# Patient Record
Sex: MALE | Race: WHITE | NOT HISPANIC OR LATINO | Employment: OTHER | RURAL
[De-identification: names, ages, dates, MRNs, and addresses within clinical notes are randomized per-mention and may not be internally consistent; named-entity substitution may affect disease eponyms.]

---

## 2019-09-30 ENCOUNTER — TELEPHONE (OUTPATIENT)
Dept: FAMILY MEDICINE CLINIC | Facility: CLINIC | Age: 69
End: 2019-09-30

## 2019-10-02 ENCOUNTER — OFFICE VISIT (OUTPATIENT)
Dept: FAMILY MEDICINE CLINIC | Facility: CLINIC | Age: 69
End: 2019-10-02

## 2019-10-02 VITALS
DIASTOLIC BLOOD PRESSURE: 99 MMHG | RESPIRATION RATE: 17 BRPM | HEIGHT: 68 IN | OXYGEN SATURATION: 95 % | SYSTOLIC BLOOD PRESSURE: 173 MMHG | TEMPERATURE: 97.2 F | WEIGHT: 231.2 LBS | HEART RATE: 58 BPM | BODY MASS INDEX: 35.04 KG/M2

## 2019-10-02 DIAGNOSIS — J42 CHRONIC BRONCHITIS, UNSPECIFIED CHRONIC BRONCHITIS TYPE (HCC): ICD-10-CM

## 2019-10-02 DIAGNOSIS — D13.4 ADENOMA OF LIVER: Primary | ICD-10-CM

## 2019-10-02 PROBLEM — Z98.890 HISTORY OF ANKLE SURGERY: Status: ACTIVE | Noted: 2019-10-02

## 2019-10-02 PROBLEM — I10 ESSENTIAL HYPERTENSION: Status: ACTIVE | Noted: 2019-10-02

## 2019-10-02 PROBLEM — E78.2 MIXED HYPERLIPIDEMIA: Status: ACTIVE | Noted: 2019-10-02

## 2019-10-02 PROBLEM — C67.8 MALIGNANT NEOPLASM OF OVERLAPPING SITES OF BLADDER: Status: ACTIVE | Noted: 2019-10-02

## 2019-10-02 PROBLEM — E11.9 TYPE 2 DIABETES MELLITUS WITHOUT COMPLICATION, WITH LONG-TERM CURRENT USE OF INSULIN: Status: ACTIVE | Noted: 2019-10-02

## 2019-10-02 PROBLEM — Z79.4 TYPE 2 DIABETES MELLITUS WITHOUT COMPLICATION, WITH LONG-TERM CURRENT USE OF INSULIN: Status: ACTIVE | Noted: 2019-10-02

## 2019-10-02 PROCEDURE — 99203 OFFICE O/P NEW LOW 30 MIN: CPT | Performed by: FAMILY MEDICINE

## 2019-10-02 RX ORDER — ALBUTEROL SULFATE 90 UG/1
2 AEROSOL, METERED RESPIRATORY (INHALATION) EVERY 4 HOURS PRN
COMMUNITY

## 2019-10-02 RX ORDER — CHLORAL HYDRATE 500 MG
1000 CAPSULE ORAL
COMMUNITY

## 2019-10-02 RX ORDER — MULTIVIT WITH MINERALS/LUTEIN
1000 TABLET ORAL DAILY
COMMUNITY
End: 2020-07-13

## 2019-10-02 RX ORDER — AMLODIPINE BESYLATE 10 MG/1
10 TABLET ORAL DAILY
COMMUNITY
End: 2020-05-27 | Stop reason: SDUPTHER

## 2019-10-02 RX ORDER — AMPICILLIN TRIHYDRATE 250 MG
500 CAPSULE ORAL DAILY
COMMUNITY
End: 2020-08-05

## 2019-10-02 RX ORDER — ATORVASTATIN CALCIUM 80 MG/1
80 TABLET, FILM COATED ORAL DAILY
COMMUNITY
End: 2020-03-25

## 2019-10-02 RX ORDER — LOSARTAN POTASSIUM 50 MG/1
50 TABLET ORAL DAILY
COMMUNITY
End: 2020-07-13

## 2019-11-13 ENCOUNTER — FLU SHOT (OUTPATIENT)
Dept: FAMILY MEDICINE CLINIC | Facility: CLINIC | Age: 69
End: 2019-11-13

## 2019-11-13 DIAGNOSIS — Z23 NEED FOR INFLUENZA VACCINATION: ICD-10-CM

## 2019-11-13 PROCEDURE — G0008 ADMIN INFLUENZA VIRUS VAC: HCPCS | Performed by: FAMILY MEDICINE

## 2019-11-13 PROCEDURE — 90653 IIV ADJUVANT VACCINE IM: CPT | Performed by: FAMILY MEDICINE

## 2019-11-26 ENCOUNTER — HOSPITAL ENCOUNTER (OUTPATIENT)
Dept: CT IMAGING | Facility: HOSPITAL | Age: 69
Discharge: HOME | End: 2019-11-26
Admitting: INTERNAL MEDICINE

## 2019-11-26 DIAGNOSIS — R91.1 INCIDENTAL PULMONARY NODULE: ICD-10-CM

## 2019-11-26 PROCEDURE — 71250 CT THORAX DX C-: CPT

## 2019-12-27 PROBLEM — J42 CHRONIC BRONCHITIS: Status: ACTIVE | Noted: 2019-12-27

## 2020-03-09 ENCOUNTER — CLINICAL SUPPORT (OUTPATIENT)
Dept: FAMILY MEDICINE CLINIC | Facility: CLINIC | Age: 70
End: 2020-03-09

## 2020-03-09 DIAGNOSIS — J30.2 ACUTE SEASONAL ALLERGIC RHINITIS: Primary | ICD-10-CM

## 2020-03-09 PROCEDURE — 95117 IMMUNOTHERAPY INJECTIONS: CPT | Performed by: FAMILY MEDICINE

## 2020-03-12 ENCOUNTER — CLINICAL SUPPORT (OUTPATIENT)
Dept: FAMILY MEDICINE CLINIC | Facility: CLINIC | Age: 70
End: 2020-03-12

## 2020-03-12 DIAGNOSIS — J30.2 ACUTE SEASONAL ALLERGIC RHINITIS: Primary | ICD-10-CM

## 2020-03-12 PROCEDURE — 95117 IMMUNOTHERAPY INJECTIONS: CPT | Performed by: FAMILY MEDICINE

## 2020-03-16 ENCOUNTER — CLINICAL SUPPORT (OUTPATIENT)
Dept: FAMILY MEDICINE CLINIC | Facility: CLINIC | Age: 70
End: 2020-03-16

## 2020-03-16 DIAGNOSIS — J30.2 ACUTE SEASONAL ALLERGIC RHINITIS: Primary | ICD-10-CM

## 2020-03-16 PROCEDURE — 95117 IMMUNOTHERAPY INJECTIONS: CPT | Performed by: FAMILY MEDICINE

## 2020-03-19 ENCOUNTER — CLINICAL SUPPORT (OUTPATIENT)
Dept: FAMILY MEDICINE CLINIC | Facility: CLINIC | Age: 70
End: 2020-03-19

## 2020-03-19 DIAGNOSIS — E11.9 TYPE 2 DIABETES MELLITUS WITHOUT COMPLICATION, WITH LONG-TERM CURRENT USE OF INSULIN (HCC): Primary | ICD-10-CM

## 2020-03-19 DIAGNOSIS — Z79.4 TYPE 2 DIABETES MELLITUS WITHOUT COMPLICATION, WITH LONG-TERM CURRENT USE OF INSULIN (HCC): Primary | ICD-10-CM

## 2020-03-19 PROCEDURE — 95117 IMMUNOTHERAPY INJECTIONS: CPT | Performed by: FAMILY MEDICINE

## 2020-03-23 ENCOUNTER — CLINICAL SUPPORT (OUTPATIENT)
Dept: FAMILY MEDICINE CLINIC | Facility: CLINIC | Age: 70
End: 2020-03-23

## 2020-03-23 DIAGNOSIS — J01.01 ACUTE RECURRENT MAXILLARY SINUSITIS: Primary | ICD-10-CM

## 2020-03-23 PROBLEM — L57.0 ACTINIC KERATOSIS: Status: ACTIVE | Noted: 2020-03-23

## 2020-03-23 PROBLEM — M25.569 KNEE PAIN: Status: ACTIVE | Noted: 2020-03-23

## 2020-03-23 PROBLEM — N40.0 BENIGN PROSTATIC HYPERPLASIA: Status: ACTIVE | Noted: 2020-03-23

## 2020-03-23 PROBLEM — J02.9 ACUTE PHARYNGITIS: Status: ACTIVE | Noted: 2020-03-23

## 2020-03-23 PROBLEM — E78.5 HYPERLIPIDEMIA: Status: ACTIVE | Noted: 2020-03-23

## 2020-03-23 PROBLEM — J01.90 ACUTE SINUSITIS: Status: ACTIVE | Noted: 2020-03-23

## 2020-03-23 PROBLEM — R59.1 LYMPHADENOPATHY: Status: ACTIVE | Noted: 2020-03-23

## 2020-03-23 PROBLEM — E11.9 DIABETES MELLITUS (HCC): Status: ACTIVE | Noted: 2020-03-23

## 2020-03-23 PROBLEM — I10 HYPERTENSION: Status: ACTIVE | Noted: 2020-03-23

## 2020-03-23 PROBLEM — J30.9 ALLERGIC RHINITIS: Status: ACTIVE | Noted: 2020-03-23

## 2020-03-23 PROCEDURE — 95117 IMMUNOTHERAPY INJECTIONS: CPT | Performed by: FAMILY MEDICINE

## 2020-03-25 ENCOUNTER — TELEPHONE (OUTPATIENT)
Dept: FAMILY MEDICINE CLINIC | Facility: CLINIC | Age: 70
End: 2020-03-25

## 2020-03-25 RX ORDER — ATORVASTATIN CALCIUM 80 MG/1
TABLET, FILM COATED ORAL
Qty: 86 TABLET | Refills: 1 | Status: SHIPPED | OUTPATIENT
Start: 2020-03-25 | End: 2020-09-21

## 2020-03-30 ENCOUNTER — OFFICE VISIT (OUTPATIENT)
Dept: FAMILY MEDICINE CLINIC | Facility: CLINIC | Age: 70
End: 2020-03-30

## 2020-03-30 VITALS
HEART RATE: 66 BPM | TEMPERATURE: 97.9 F | DIASTOLIC BLOOD PRESSURE: 82 MMHG | HEIGHT: 68 IN | WEIGHT: 232 LBS | OXYGEN SATURATION: 94 % | BODY MASS INDEX: 35.16 KG/M2 | RESPIRATION RATE: 18 BRPM | SYSTOLIC BLOOD PRESSURE: 124 MMHG

## 2020-03-30 DIAGNOSIS — J30.2 ACUTE SEASONAL ALLERGIC RHINITIS: Primary | ICD-10-CM

## 2020-03-30 DIAGNOSIS — H01.005 BLEPHARITIS OF LEFT LOWER EYELID, UNSPECIFIED TYPE: ICD-10-CM

## 2020-03-30 PROCEDURE — 95117 IMMUNOTHERAPY INJECTIONS: CPT | Performed by: FAMILY MEDICINE

## 2020-03-30 PROCEDURE — 99213 OFFICE O/P EST LOW 20 MIN: CPT | Performed by: FAMILY MEDICINE

## 2020-03-30 RX ORDER — IPRATROPIUM BROMIDE 42 UG/1
SPRAY, METERED NASAL
COMMUNITY
Start: 2020-02-25 | End: 2022-06-29

## 2020-03-30 RX ORDER — ERYTHROMYCIN 5 MG/G
OINTMENT OPHTHALMIC 3 TIMES DAILY
Qty: 1 EACH | Refills: 0 | Status: SHIPPED | OUTPATIENT
Start: 2020-03-30 | End: 2020-07-13

## 2020-03-30 NOTE — PROGRESS NOTES
Subjective   Herson Ruby is a 69 y.o. male.     Sx started 3 days ago. No trauma, no FB/welding. No vision changes.     Eye Pain    The left eye is affected. The current episode started in the past 7 days (Friday). The problem occurs constantly. There was no injury mechanism. Associated symptoms include an eye discharge. Pertinent negatives include no fever, nausea, vomiting or weakness. Treatments tried: neomycin and polymyxin b and dexamethasone ointment. The treatment provided significant relief.        The following portions of the patient's history were reviewed and updated as appropriate: allergies, current medications, past family history, past medical history, past social history, past surgical history and problem list.    Patient Active Problem List   Diagnosis   • Essential hypertension   • Mixed hyperlipidemia   • Malignant neoplasm of overlapping sites of bladder (CMS/HCC)   • Adenoma of liver   • History of ankle surgery   • Type 2 diabetes mellitus without complication, with long-term current use of insulin (CMS/HCC)   • Chronic bronchitis (CMS/HCC)   • Actinic keratosis   • Acute pharyngitis   • Acute recurrent maxillary sinusitis   • Acute sinusitis   • Allergic rhinitis   • Benign prostatic hyperplasia   • Diabetes mellitus (CMS/HCC)   • Knee pain   • Lymphadenopathy   • Hypertension   • Hyperlipidemia       Current Outpatient Medications on File Prior to Visit   Medication Sig Dispense Refill   • albuterol sulfate  (90 Base) MCG/ACT inhaler Inhale 2 puffs Every 4 (Four) Hours As Needed for Wheezing.     • amLODIPine (NORVASC) 10 MG tablet Take 10 mg by mouth Daily. For blood pressure     • atorvastatin (LIPITOR) 80 MG tablet TAKE 1 TABLET BY MOUTH EVERYDAY AT BEDTIME 86 tablet 1   • ipratropium (ATROVENT) 0.06 % nasal spray INSTILL TWO SPRAYS INTO EACH NOSTRIL TWICE DAILY     • losartan (COZAAR) 50 MG tablet Take 50 mg by mouth Daily. For  blood pressure     • metFORMIN (GLUCOPHAGE) 500 MG  tablet TAKE 1 TABLET BY MOUTH TWICE A  tablet 0   • Multiple Vitamins-Minerals (CENTRUM SILVER 50+MEN PO) Take  by mouth.     • Omega-3 Fatty Acids (FISH OIL) 1000 MG capsule capsule Take 2,000 mg by mouth Daily With Breakfast.     • Cinnamon 500 MG capsule Take 500 mg by mouth Daily.     • umeclidinium-vilanterol (ANORO ELLIPTA) 62.5-25 MCG/INH aerosol powder  inhaler Inhale 1 puff Daily.     • vitamin E 1000 UNIT capsule Take 1,000 Units by mouth Daily.       No current facility-administered medications on file prior to visit.      Current outpatient and discharge medications have been reconciled for the patient.  Reviewed by: Santana Sharma MD      Allergies   Allergen Reactions   • Valsartan GI Intolerance     Chest pain, dizzy,        Review of Systems   Constitutional: Negative for activity change, appetite change, fatigue and fever.   HENT: Negative for ear pain, swollen glands and voice change.    Eyes: Positive for pain and discharge. Negative for visual disturbance.   Respiratory: Negative for shortness of breath and wheezing.    Cardiovascular: Negative for chest pain and leg swelling.   Gastrointestinal: Negative for abdominal pain, blood in stool, constipation, diarrhea, nausea and vomiting.   Endocrine: Negative for polydipsia and polyuria.   Genitourinary: Negative for dysuria, frequency and hematuria.   Musculoskeletal: Negative for joint swelling, neck pain and neck stiffness.   Skin: Negative for rash and bruise.   Neurological: Negative for weakness, numbness and headache.   Psychiatric/Behavioral: Negative for suicidal ideas and depressed mood.     I have reviewed and confirmed the accuracy of the ROS as documented by the MA/LPN/RN Santana Sharma MD      Objective   Vitals:    03/30/20 1058   BP: 124/82   Pulse: 66   Resp: 18   Temp: 97.9 °F (36.6 °C)   SpO2: 94%     Physical Exam   Constitutional: He is oriented to person, place, and time. He appears well-developed and  well-nourished.   Eyes: Pupils are equal, round, and reactive to light. Conjunctivae and EOM are normal. Left eye exhibits no discharge, no exudate and no hordeolum. No foreign body present in the left eye. Left conjunctiva is not injected.   No abrasion on fluorescein staining   Neck: Normal range of motion. Neck supple.   Cardiovascular: Normal rate, regular rhythm and normal heart sounds.   Pulmonary/Chest: Effort normal and breath sounds normal.   Abdominal: Soft. Bowel sounds are normal.   Musculoskeletal: Normal range of motion.   Neurological: He is alert and oriented to person, place, and time.   Skin: Skin is warm and dry.   Psychiatric: He has a normal mood and affect. His behavior is normal. Judgment and thought content normal.       I wore protective equipment throughout this patient encounter to include gloves. Hand hygiene was performed before donning protective equipment and after removal when leaving the room.    Assessment/Plan .  Problem List Items Addressed This Visit     None      Visit Diagnoses     Acute seasonal allergic rhinitis    -  Primary    Relevant Medications    ipratropium (ATROVENT) 0.06 % nasal spray    Allergy Serum Injection (Completed)    Allergy Serum Injection (Completed)    Blepharitis of left lower eyelid, unspecified type        Relevant Medications    erythromycin (ROMYCIN) 5 MG/GM ophthalmic ointment      Exam benign. Suspect partially treated blepharitis.  Findings discussed. All questions answered.  Differential diagnosis discussed.   Follow-up in approximately 3 days for reevaluation if not improved.  Follow-up sooner for worsening symptoms or any concerns. Follow-up for routine health maintenance as directed

## 2020-04-02 ENCOUNTER — CLINICAL SUPPORT (OUTPATIENT)
Dept: FAMILY MEDICINE CLINIC | Facility: CLINIC | Age: 70
End: 2020-04-02

## 2020-04-02 DIAGNOSIS — J30.2 ACUTE SEASONAL ALLERGIC RHINITIS: Primary | ICD-10-CM

## 2020-04-02 PROCEDURE — 95117 IMMUNOTHERAPY INJECTIONS: CPT | Performed by: FAMILY MEDICINE

## 2020-04-06 ENCOUNTER — CLINICAL SUPPORT (OUTPATIENT)
Dept: FAMILY MEDICINE CLINIC | Facility: CLINIC | Age: 70
End: 2020-04-06

## 2020-04-06 DIAGNOSIS — J30.2 ACUTE SEASONAL ALLERGIC RHINITIS: Primary | ICD-10-CM

## 2020-04-06 PROCEDURE — 95117 IMMUNOTHERAPY INJECTIONS: CPT | Performed by: FAMILY MEDICINE

## 2020-04-09 ENCOUNTER — CLINICAL SUPPORT (OUTPATIENT)
Dept: FAMILY MEDICINE CLINIC | Facility: CLINIC | Age: 70
End: 2020-04-09

## 2020-04-09 DIAGNOSIS — J30.2 ACUTE SEASONAL ALLERGIC RHINITIS: Primary | ICD-10-CM

## 2020-04-09 PROCEDURE — 95117 IMMUNOTHERAPY INJECTIONS: CPT | Performed by: FAMILY MEDICINE

## 2020-04-13 ENCOUNTER — CLINICAL SUPPORT (OUTPATIENT)
Dept: FAMILY MEDICINE CLINIC | Facility: CLINIC | Age: 70
End: 2020-04-13

## 2020-04-13 ENCOUNTER — TELEPHONE (OUTPATIENT)
Dept: FAMILY MEDICINE CLINIC | Facility: CLINIC | Age: 70
End: 2020-04-13

## 2020-04-13 DIAGNOSIS — J30.1 SEASONAL ALLERGIC RHINITIS DUE TO POLLEN: Primary | ICD-10-CM

## 2020-04-13 PROCEDURE — 95117 IMMUNOTHERAPY INJECTIONS: CPT | Performed by: FAMILY MEDICINE

## 2020-04-13 NOTE — TELEPHONE ENCOUNTER
Patient came in office today for his allergy shot that he gets twice a week.     Patient was instructed that we had not yet received his new vials. Patient was not happy due to him driving an hour to receive these shots, plus being out and about during this virus. I apologized to patient and told him I would call the place about his serum today and he went ahead and called them while he was standing there talking to me as he states that he wanted to talk to them.   He asked me about receiving shots from his old vial as he states that he was told there was enough: however, I explained to him that I could not do that as it said on the papers that he could only have 10 shots to of the vial. He said that he was going to talk to them about it if they answered.     He got ahold of them and talked to [place]. She said that they did not receive his Re-order serum fax until the 9th but it was sent from our office on 4/2/2020. She said  that she was putting them in the mail today and we should get them by Wednesday. She said that as long as we had 0.35 in his old vials we could administer them to him until he got the new ones.  There was 0.50 in both Vial 1 and Vial A and they was administered.    patient was advised to call us before he traveled to us the next time and make sure we have received his new vials.

## 2020-04-16 ENCOUNTER — CLINICAL SUPPORT (OUTPATIENT)
Dept: FAMILY MEDICINE CLINIC | Facility: CLINIC | Age: 70
End: 2020-04-16

## 2020-04-16 DIAGNOSIS — J30.1 SEASONAL ALLERGIC RHINITIS DUE TO POLLEN: Primary | ICD-10-CM

## 2020-04-16 PROCEDURE — 95117 IMMUNOTHERAPY INJECTIONS: CPT | Performed by: FAMILY MEDICINE

## 2020-04-20 ENCOUNTER — CLINICAL SUPPORT (OUTPATIENT)
Dept: FAMILY MEDICINE CLINIC | Facility: CLINIC | Age: 70
End: 2020-04-20

## 2020-04-20 DIAGNOSIS — J30.1 SEASONAL ALLERGIC RHINITIS DUE TO POLLEN: Primary | ICD-10-CM

## 2020-04-20 PROCEDURE — 95117 IMMUNOTHERAPY INJECTIONS: CPT | Performed by: FAMILY MEDICINE

## 2020-04-23 ENCOUNTER — CLINICAL SUPPORT (OUTPATIENT)
Dept: FAMILY MEDICINE CLINIC | Facility: CLINIC | Age: 70
End: 2020-04-23

## 2020-04-23 DIAGNOSIS — J01.10 ACUTE FRONTAL SINUSITIS, RECURRENCE NOT SPECIFIED: Primary | ICD-10-CM

## 2020-04-23 PROCEDURE — 95117 IMMUNOTHERAPY INJECTIONS: CPT | Performed by: FAMILY MEDICINE

## 2020-04-30 ENCOUNTER — CLINICAL SUPPORT (OUTPATIENT)
Dept: FAMILY MEDICINE CLINIC | Facility: CLINIC | Age: 70
End: 2020-04-30

## 2020-04-30 DIAGNOSIS — J30.1 SEASONAL ALLERGIC RHINITIS DUE TO POLLEN: Primary | ICD-10-CM

## 2020-04-30 PROCEDURE — 95117 IMMUNOTHERAPY INJECTIONS: CPT | Performed by: FAMILY MEDICINE

## 2020-05-04 ENCOUNTER — CLINICAL SUPPORT (OUTPATIENT)
Dept: FAMILY MEDICINE CLINIC | Facility: CLINIC | Age: 70
End: 2020-05-04

## 2020-05-04 DIAGNOSIS — J30.1 SEASONAL ALLERGIC RHINITIS DUE TO POLLEN: Primary | ICD-10-CM

## 2020-05-04 PROCEDURE — 95117 IMMUNOTHERAPY INJECTIONS: CPT | Performed by: FAMILY MEDICINE

## 2020-05-07 ENCOUNTER — CLINICAL SUPPORT (OUTPATIENT)
Dept: FAMILY MEDICINE CLINIC | Facility: CLINIC | Age: 70
End: 2020-05-07

## 2020-05-07 DIAGNOSIS — J30.1 SEASONAL ALLERGIC RHINITIS DUE TO POLLEN: Primary | ICD-10-CM

## 2020-05-07 PROCEDURE — 95117 IMMUNOTHERAPY INJECTIONS: CPT | Performed by: FAMILY MEDICINE

## 2020-05-11 ENCOUNTER — CLINICAL SUPPORT (OUTPATIENT)
Dept: FAMILY MEDICINE CLINIC | Facility: CLINIC | Age: 70
End: 2020-05-11

## 2020-05-11 DIAGNOSIS — J30.1 SEASONAL ALLERGIC RHINITIS DUE TO POLLEN: Primary | ICD-10-CM

## 2020-05-11 PROCEDURE — 95117 IMMUNOTHERAPY INJECTIONS: CPT | Performed by: FAMILY MEDICINE

## 2020-05-14 ENCOUNTER — CLINICAL SUPPORT (OUTPATIENT)
Dept: FAMILY MEDICINE CLINIC | Facility: CLINIC | Age: 70
End: 2020-05-14

## 2020-05-14 DIAGNOSIS — J01.01 ACUTE RECURRENT MAXILLARY SINUSITIS: Primary | ICD-10-CM

## 2020-05-14 PROCEDURE — 95117 IMMUNOTHERAPY INJECTIONS: CPT | Performed by: FAMILY MEDICINE

## 2020-05-18 ENCOUNTER — CLINICAL SUPPORT (OUTPATIENT)
Dept: FAMILY MEDICINE CLINIC | Facility: CLINIC | Age: 70
End: 2020-05-18

## 2020-05-18 DIAGNOSIS — J30.1 SEASONAL ALLERGIC RHINITIS DUE TO POLLEN: Primary | ICD-10-CM

## 2020-05-18 PROCEDURE — 95117 IMMUNOTHERAPY INJECTIONS: CPT | Performed by: FAMILY MEDICINE

## 2020-05-21 ENCOUNTER — CLINICAL SUPPORT (OUTPATIENT)
Dept: FAMILY MEDICINE CLINIC | Facility: CLINIC | Age: 70
End: 2020-05-21

## 2020-05-21 DIAGNOSIS — J30.1 SEASONAL ALLERGIC RHINITIS DUE TO POLLEN: Primary | ICD-10-CM

## 2020-05-21 PROCEDURE — 95117 IMMUNOTHERAPY INJECTIONS: CPT | Performed by: FAMILY MEDICINE

## 2020-05-26 ENCOUNTER — TELEPHONE (OUTPATIENT)
Dept: FAMILY MEDICINE CLINIC | Facility: CLINIC | Age: 70
End: 2020-05-26

## 2020-05-26 DIAGNOSIS — I10 ESSENTIAL HYPERTENSION: Primary | ICD-10-CM

## 2020-05-27 RX ORDER — AMLODIPINE BESYLATE 10 MG/1
10 TABLET ORAL DAILY
Qty: 90 TABLET | Refills: 0 | Status: SHIPPED | OUTPATIENT
Start: 2020-05-27 | End: 2020-08-12 | Stop reason: SDUPTHER

## 2020-05-28 ENCOUNTER — CLINICAL SUPPORT (OUTPATIENT)
Dept: FAMILY MEDICINE CLINIC | Facility: CLINIC | Age: 70
End: 2020-05-28

## 2020-05-28 DIAGNOSIS — J01.01 ACUTE RECURRENT MAXILLARY SINUSITIS: ICD-10-CM

## 2020-05-28 DIAGNOSIS — J30.1 SEASONAL ALLERGIC RHINITIS DUE TO POLLEN: Primary | ICD-10-CM

## 2020-05-28 PROCEDURE — 95117 IMMUNOTHERAPY INJECTIONS: CPT | Performed by: FAMILY MEDICINE

## 2020-06-01 ENCOUNTER — CLINICAL SUPPORT (OUTPATIENT)
Dept: FAMILY MEDICINE CLINIC | Facility: CLINIC | Age: 70
End: 2020-06-01

## 2020-06-01 DIAGNOSIS — J30.1 SEASONAL ALLERGIC RHINITIS DUE TO POLLEN: Primary | ICD-10-CM

## 2020-06-01 PROCEDURE — 95117 IMMUNOTHERAPY INJECTIONS: CPT | Performed by: FAMILY MEDICINE

## 2020-06-04 ENCOUNTER — CLINICAL SUPPORT (OUTPATIENT)
Dept: FAMILY MEDICINE CLINIC | Facility: CLINIC | Age: 70
End: 2020-06-04

## 2020-06-04 DIAGNOSIS — J30.1 SEASONAL ALLERGIC RHINITIS DUE TO POLLEN: Primary | ICD-10-CM

## 2020-06-04 PROCEDURE — 95117 IMMUNOTHERAPY INJECTIONS: CPT | Performed by: FAMILY MEDICINE

## 2020-06-08 ENCOUNTER — CLINICAL SUPPORT (OUTPATIENT)
Dept: FAMILY MEDICINE CLINIC | Facility: CLINIC | Age: 70
End: 2020-06-08

## 2020-06-08 DIAGNOSIS — J01.01 ACUTE RECURRENT MAXILLARY SINUSITIS: Primary | ICD-10-CM

## 2020-06-08 PROCEDURE — 95117 IMMUNOTHERAPY INJECTIONS: CPT | Performed by: FAMILY MEDICINE

## 2020-06-11 ENCOUNTER — CLINICAL SUPPORT (OUTPATIENT)
Dept: FAMILY MEDICINE CLINIC | Facility: CLINIC | Age: 70
End: 2020-06-11

## 2020-06-11 DIAGNOSIS — J30.1 SEASONAL ALLERGIC RHINITIS DUE TO POLLEN: Primary | ICD-10-CM

## 2020-06-11 PROBLEM — E66.811 CLASS 1 OBESITY WITHOUT SERIOUS COMORBIDITY WITH BODY MASS INDEX (BMI) OF 31.0 TO 31.9 IN ADULT: Status: ACTIVE | Noted: 2019-09-13

## 2020-06-11 PROBLEM — J01.90 ACUTE SINUSITIS: Status: ACTIVE | Noted: 2020-06-11

## 2020-06-11 PROBLEM — E66.9 CLASS 1 OBESITY WITHOUT SERIOUS COMORBIDITY WITH BODY MASS INDEX (BMI) OF 31.0 TO 31.9 IN ADULT: Status: ACTIVE | Noted: 2019-09-13

## 2020-06-11 PROBLEM — R53.83 FATIGUE: Status: ACTIVE | Noted: 2019-09-13

## 2020-06-11 PROBLEM — Z00.00 PREVENTATIVE HEALTH CARE: Status: ACTIVE | Noted: 2019-09-13

## 2020-06-11 PROBLEM — Z12.11 SCREENING FOR COLON CANCER: Status: ACTIVE | Noted: 2019-08-01

## 2020-06-11 PROBLEM — R10.32 LEFT LOWER QUADRANT ABDOMINAL PAIN: Status: ACTIVE | Noted: 2020-06-11

## 2020-06-11 PROBLEM — E88.81 INSULIN RESISTANCE: Status: ACTIVE | Noted: 2019-09-13

## 2020-06-11 PROBLEM — N13.8 BENIGN PROSTATIC HYPERPLASIA WITH URINARY OBSTRUCTION: Status: ACTIVE | Noted: 2020-06-11

## 2020-06-11 PROBLEM — E88.819 INSULIN RESISTANCE: Status: ACTIVE | Noted: 2019-09-13

## 2020-06-11 PROBLEM — J02.9 ACUTE PHARYNGITIS: Status: ACTIVE | Noted: 2020-06-11

## 2020-06-11 PROBLEM — N40.1 BENIGN PROSTATIC HYPERPLASIA WITH URINARY OBSTRUCTION: Status: ACTIVE | Noted: 2020-06-11

## 2020-06-11 PROCEDURE — 95117 IMMUNOTHERAPY INJECTIONS: CPT | Performed by: FAMILY MEDICINE

## 2020-06-15 ENCOUNTER — CLINICAL SUPPORT (OUTPATIENT)
Dept: FAMILY MEDICINE CLINIC | Facility: CLINIC | Age: 70
End: 2020-06-15

## 2020-06-15 DIAGNOSIS — J30.1 SEASONAL ALLERGIC RHINITIS DUE TO POLLEN: ICD-10-CM

## 2020-06-15 DIAGNOSIS — J01.10 ACUTE FRONTAL SINUSITIS, RECURRENCE NOT SPECIFIED: Primary | ICD-10-CM

## 2020-06-15 PROCEDURE — 95117 IMMUNOTHERAPY INJECTIONS: CPT | Performed by: FAMILY MEDICINE

## 2020-06-18 ENCOUNTER — CLINICAL SUPPORT (OUTPATIENT)
Dept: FAMILY MEDICINE CLINIC | Facility: CLINIC | Age: 70
End: 2020-06-18

## 2020-06-18 DIAGNOSIS — J30.1 SEASONAL ALLERGIC RHINITIS DUE TO POLLEN: Primary | ICD-10-CM

## 2020-06-18 PROCEDURE — 95117 IMMUNOTHERAPY INJECTIONS: CPT | Performed by: FAMILY MEDICINE

## 2020-06-22 ENCOUNTER — CLINICAL SUPPORT (OUTPATIENT)
Dept: FAMILY MEDICINE CLINIC | Facility: CLINIC | Age: 70
End: 2020-06-22

## 2020-06-22 DIAGNOSIS — J30.1 SEASONAL ALLERGIC RHINITIS DUE TO POLLEN: Primary | ICD-10-CM

## 2020-06-22 PROCEDURE — 95117 IMMUNOTHERAPY INJECTIONS: CPT | Performed by: FAMILY MEDICINE

## 2020-06-25 ENCOUNTER — CLINICAL SUPPORT (OUTPATIENT)
Dept: FAMILY MEDICINE CLINIC | Facility: CLINIC | Age: 70
End: 2020-06-25

## 2020-06-25 DIAGNOSIS — J30.1 SEASONAL ALLERGIC RHINITIS DUE TO POLLEN: Primary | ICD-10-CM

## 2020-06-25 PROCEDURE — 95117 IMMUNOTHERAPY INJECTIONS: CPT | Performed by: FAMILY MEDICINE

## 2020-07-02 ENCOUNTER — CLINICAL SUPPORT (OUTPATIENT)
Dept: FAMILY MEDICINE CLINIC | Facility: CLINIC | Age: 70
End: 2020-07-02

## 2020-07-02 DIAGNOSIS — J30.1 SEASONAL ALLERGIC RHINITIS DUE TO POLLEN: Primary | ICD-10-CM

## 2020-07-02 PROCEDURE — 95117 IMMUNOTHERAPY INJECTIONS: CPT | Performed by: FAMILY MEDICINE

## 2020-07-02 RX ORDER — LOSARTAN POTASSIUM 100 MG/1
50 TABLET ORAL DAILY
COMMUNITY
Start: 2020-05-01 | End: 2020-08-12 | Stop reason: SDUPTHER

## 2020-07-06 ENCOUNTER — OFFICE VISIT (OUTPATIENT)
Dept: FAMILY MEDICINE CLINIC | Facility: CLINIC | Age: 70
End: 2020-07-06

## 2020-07-06 VITALS
BODY MASS INDEX: 32.27 KG/M2 | HEART RATE: 53 BPM | RESPIRATION RATE: 18 BRPM | SYSTOLIC BLOOD PRESSURE: 158 MMHG | WEIGHT: 225.4 LBS | DIASTOLIC BLOOD PRESSURE: 85 MMHG | TEMPERATURE: 97 F | OXYGEN SATURATION: 97 % | HEIGHT: 70 IN

## 2020-07-06 DIAGNOSIS — R51.9 NONINTRACTABLE HEADACHE, UNSPECIFIED CHRONICITY PATTERN, UNSPECIFIED HEADACHE TYPE: Primary | ICD-10-CM

## 2020-07-06 PROCEDURE — 99213 OFFICE O/P EST LOW 20 MIN: CPT | Performed by: FAMILY MEDICINE

## 2020-07-06 NOTE — ASSESSMENT & PLAN NOTE
Unknown etiology of headaches.  Due to short duration of the headaches, no treatment was prescribed.  Patient was advised to monitor his symptoms and keep a diary.

## 2020-07-06 NOTE — PROGRESS NOTES
Chief Complaint   Patient presents with   • Headache       History of Present Illness:  Subjective   Herson Ruby is a 69 y.o. male.   Left eye burning started this past weekend.    Headache    This is a recurrent problem. The current episode started in the past 7 days. The problem occurs intermittently. The pain is located in the left unilateral region. The pain does not radiate. The pain quality is not similar to prior headaches. The quality of the pain is described as sharp (and burning). Associated symptoms include numbness. Pertinent negatives include no back pain, blurred vision, coughing, dizziness, ear pain, facial sweating, loss of balance, nausea, rhinorrhea, seizures, tingling, vomiting or weakness.        Allergies:  Allergies   Allergen Reactions   • Valsartan GI Intolerance     Chest pain, dizzy,        Social History:  Social History     Socioeconomic History   • Marital status:      Spouse name: Not on file   • Number of children: Not on file   • Years of education: Not on file   • Highest education level: Not on file   Tobacco Use   • Smoking status: Former Smoker   • Smokeless tobacco: Never Used   Substance and Sexual Activity   • Alcohol use: No     Frequency: Never   • Drug use: No       Family History:  Family History   Problem Relation Age of Onset   • Cancer Mother         Lung-smoker   • Cancer Father         lung-smoker   • Diabetes Father    • Heart disease Father    • Cancer Brother         Lung-smoker,  at 57   • Alcohol abuse Brother    • Heart disease Brother         MI at age 51 and  at 51   • Cancer Sister         Lung-smoker   • Cancer Sister    • Other Sister         MS  at 37   • Heart disease Sister    • Kidney disease Sister    • Liver disease Sister    • Cancer Sister         Lung-smoker       Past Medical History :  Active Ambulatory Problems     Diagnosis Date Noted   • Essential hypertension 10/02/2019   • Mixed hyperlipidemia 10/02/2019   •  Malignant neoplasm of overlapping sites of bladder (CMS/HCC) 10/02/2019   • Adenoma of liver 10/02/2019   • History of ankle surgery 10/02/2019   • Type 2 diabetes mellitus without complication, with long-term current use of insulin (CMS/[facility]) 10/02/2019   • Chronic bronchitis (CMS/[facility]) 12/27/2019   • Actinic keratosis 03/23/2020   • Acute pharyngitis 03/23/2020   • Acute recurrent maxillary sinusitis 02/21/2019   • Acute sinusitis 03/23/2020   • Allergic rhinitis 03/23/2020   • Benign prostatic hyperplasia 03/23/2020   • Diabetes mellitus (CMS/[facility]) 03/23/2020   • Knee pain 03/23/2020   • Lymphadenopathy 03/23/2020   • Hypertension 03/23/2020   • Hyperlipidemia 03/23/2020   • Class 1 obesity without serious comorbidity with body mass index (BMI) of 31.0 to 31.9 in adult 09/13/2019   • Fatigue 09/13/2019   • Left lower quadrant abdominal pain 06/11/2020   • Preventative health care 09/13/2019   • Screening for colon cancer 08/01/2019   • Acute pharyngitis 06/11/2020   • Acute sinusitis 06/11/2020   • Benign prostatic hyperplasia with urinary obstruction 06/11/2020   • Insulin resistance 09/13/2019   • Nonintractable headache 07/06/2020     Resolved Ambulatory Problems     Diagnosis Date Noted   • No Resolved Ambulatory Problems     Past Medical History:   Diagnosis Date   • Allergic    • Bladder cancer (CMS/[facility])    • COPD (chronic obstructive pulmonary disease) (CMS/[facility])    • Diverticulosis    • GERD (gastroesophageal reflux disease)        Medication List:  Outpatient Encounter Medications as of 7/6/2020   Medication Sig Dispense Refill   • amLODIPine (NORVASC) 10 MG tablet Take 1 tablet by mouth Daily. For blood pressure 90 tablet 0   • atorvastatin (LIPITOR) 80 MG tablet TAKE 1 TABLET BY MOUTH EVERYDAY AT BEDTIME 86 tablet 1   • erythromycin (ROMYCIN) 5 MG/GM ophthalmic ointment Administer  into the left eye 3 (Three) Times a Day. 1 each 0   • ipratropium (ATROVENT) 0.06 % nasal spray INSTILL TWO SPRAYS INTO EACH  NOSTRIL TWICE DAILY     • losartan (COZAAR) 50 MG tablet Take 50 mg by mouth Daily. For  blood pressure     • metFORMIN (GLUCOPHAGE) 500 MG tablet TAKE 1 TABLET BY MOUTH TWICE A  tablet 0   • Multiple Vitamins-Minerals (CENTRUM SILVER 50+MEN PO) Take  by mouth.     • Omega-3 Fatty Acids (FISH OIL) 1000 MG capsule capsule Take 2,000 mg by mouth Daily With Breakfast.     • Saw Palmetto-Phytosterols (PROSTATE SR PO) Take  by mouth.     • umeclidinium-vilanterol (ANORO ELLIPTA) 62.5-25 MCG/INH aerosol powder  inhaler Inhale 1 puff Daily.     • vitamin E 1000 UNIT capsule Take 1,000 Units by mouth Daily.     • albuterol sulfate  (90 Base) MCG/ACT inhaler Inhale 2 puffs Every 4 (Four) Hours As Needed for Wheezing.     • Cinnamon 500 MG capsule Take 500 mg by mouth Daily.     • losartan (COZAAR) 100 MG tablet Take 50 mg by mouth Daily.       No facility-administered encounter medications on file as of 7/6/2020.        Past Surgical History:  Past Surgical History:   Procedure Laterality Date   • ANKLE FUSION     • COLONOSCOPY      2016   • CYST REMOVAL     • HERNIA REPAIR      2016   • SHOULDER SURGERY          The following portions of the patient's history were reviewed and updated as appropriate: allergies, current medications, past family history, past medical history, past social history, past surgical history and problem list.    Review Of Systems:  Review of Systems   Constitutional: Negative for activity change, appetite change and fatigue.   HENT: Negative for ear discharge, ear pain, postnasal drip and rhinorrhea.    Eyes: Negative for blurred vision, double vision and discharge.   Respiratory: Negative for cough, chest tightness and shortness of breath.    Cardiovascular: Negative for chest pain.   Gastrointestinal: Negative for nausea and vomiting.   Endocrine: Negative for cold intolerance and heat intolerance.   Musculoskeletal: Negative for back pain, gait problem and joint swelling.   Skin:  "Negative for color change and rash.   Neurological: Positive for numbness and headache. Negative for dizziness, tingling, seizures, facial asymmetry, weakness, loss of balance and confusion.   Psychiatric/Behavioral: Negative for behavioral problems.       Objective     Physical Exam:  Vital Signs:  Visit Vitals  /85 (BP Location: Right arm, Patient Position: Sitting, Cuff Size: Large Adult)   Pulse 53   Temp 97 °F (36.1 °C) (Oral)   Resp 18   Ht 177.8 cm (70\")   Wt 102 kg (225 lb 6.4 oz)   SpO2 97%   BMI 32.34 kg/m²       Physical Exam   Constitutional: He is oriented to person, place, and time. He appears well-developed and well-nourished.   HENT:   Head: Normocephalic.   Right Ear: External ear normal.   Left Ear: External ear normal.   Nose: Nose normal.   Eyes: Conjunctivae are normal.   Neck: Normal range of motion. Neck supple.   Cardiovascular: Normal rate and regular rhythm.   Pulmonary/Chest: Effort normal and breath sounds normal.   Musculoskeletal: Normal range of motion.   Neurological: He is alert and oriented to person, place, and time.   Skin: Skin is warm and dry. Capillary refill takes less than 2 seconds.   Vitals reviewed.      Assessment/Plan   Assessment and Plan:  Diagnoses and all orders for this visit:    1. Nonintractable headache, unspecified chronicity pattern, unspecified headache type (Primary)  Assessment & Plan:  Unknown etiology of headaches.  Due to short duration of the headaches, no treatment was prescribed.  Patient was advised to monitor his symptoms and keep a diary.                       "

## 2020-07-08 DIAGNOSIS — H01.005 BLEPHARITIS OF LEFT LOWER EYELID, UNSPECIFIED TYPE: ICD-10-CM

## 2020-07-08 NOTE — TELEPHONE ENCOUNTER
He was checking the status of the eye ointment that needs to be sent to [facility] in [place].

## 2020-07-09 ENCOUNTER — CLINICAL SUPPORT (OUTPATIENT)
Dept: FAMILY MEDICINE CLINIC | Facility: CLINIC | Age: 70
End: 2020-07-09

## 2020-07-09 DIAGNOSIS — J30.1 SEASONAL ALLERGIC RHINITIS DUE TO POLLEN: Primary | ICD-10-CM

## 2020-07-09 PROCEDURE — 95117 IMMUNOTHERAPY INJECTIONS: CPT | Performed by: FAMILY MEDICINE

## 2020-07-09 RX ORDER — ERYTHROMYCIN 5 MG/G
OINTMENT OPHTHALMIC
Qty: 3.5 G | Refills: 0 | OUTPATIENT
Start: 2020-07-09

## 2020-07-13 ENCOUNTER — OFFICE VISIT (OUTPATIENT)
Dept: FAMILY MEDICINE CLINIC | Facility: CLINIC | Age: 70
End: 2020-07-13

## 2020-07-13 ENCOUNTER — HOSPITAL ENCOUNTER (OUTPATIENT)
Dept: CT IMAGING | Facility: HOSPITAL | Age: 70
Discharge: HOME | End: 2020-07-13
Admitting: FAMILY MEDICINE

## 2020-07-13 VITALS
WEIGHT: 226.4 LBS | BODY MASS INDEX: 32.41 KG/M2 | OXYGEN SATURATION: 98 % | DIASTOLIC BLOOD PRESSURE: 76 MMHG | RESPIRATION RATE: 16 BRPM | HEIGHT: 70 IN | SYSTOLIC BLOOD PRESSURE: 134 MMHG | HEART RATE: 60 BPM | TEMPERATURE: 98.1 F

## 2020-07-13 DIAGNOSIS — R51.9 ACUTE INTRACTABLE HEADACHE, UNSPECIFIED HEADACHE TYPE: Primary | ICD-10-CM

## 2020-07-13 DIAGNOSIS — R51.9 ACUTE INTRACTABLE HEADACHE, UNSPECIFIED HEADACHE TYPE: ICD-10-CM

## 2020-07-13 PROBLEM — J01.90 ACUTE SINUSITIS: Status: RESOLVED | Noted: 2020-06-11 | Resolved: 2020-07-13

## 2020-07-13 PROBLEM — J02.9 ACUTE PHARYNGITIS: Status: RESOLVED | Noted: 2020-06-11 | Resolved: 2020-07-13

## 2020-07-13 PROBLEM — E11.9 DIABETES MELLITUS: Status: RESOLVED | Noted: 2020-03-23 | Resolved: 2020-07-13

## 2020-07-13 PROBLEM — I10 ESSENTIAL HYPERTENSION: Status: RESOLVED | Noted: 2019-10-02 | Resolved: 2020-07-13

## 2020-07-13 PROBLEM — E78.5 HYPERLIPIDEMIA: Status: RESOLVED | Noted: 2020-03-23 | Resolved: 2020-07-13

## 2020-07-13 PROBLEM — J01.01 ACUTE RECURRENT MAXILLARY SINUSITIS: Status: RESOLVED | Noted: 2019-02-21 | Resolved: 2020-07-13

## 2020-07-13 PROBLEM — J01.90 ACUTE SINUSITIS: Status: RESOLVED | Noted: 2020-03-23 | Resolved: 2020-07-13

## 2020-07-13 PROBLEM — J02.9 ACUTE PHARYNGITIS: Status: RESOLVED | Noted: 2020-03-23 | Resolved: 2020-07-13

## 2020-07-13 PROCEDURE — 70450 CT HEAD/BRAIN W/O DYE: CPT

## 2020-07-13 PROCEDURE — 99214 OFFICE O/P EST MOD 30 MIN: CPT | Performed by: FAMILY MEDICINE

## 2020-07-13 RX ORDER — ASCORBIC ACID 500 MG
500 TABLET ORAL DAILY
COMMUNITY

## 2020-07-13 NOTE — PROGRESS NOTES
Subjective   Herson Ruby is a 69 y.o. male.     Chief Complaint   Patient presents with   • Headache       Headache    This is a recurrent problem. The current episode started in the past 7 days. The problem occurs intermittently. The problem has been gradually worsening. The pain is located in the left unilateral region. The pain does not radiate. The pain quality is not similar to prior headaches. The quality of the pain is described as sharp (and burning). The pain is mild. Associated symptoms include numbness and scalp tenderness. Pertinent negatives include no abdominal pain, back pain, blurred vision, coughing, dizziness, ear pain, facial sweating, fever, hearing loss, loss of balance, nausea, phonophobia, photophobia, rhinorrhea, seizures, sinus pressure, tingling, vomiting or weakness. Nothing aggravates the symptoms. He has tried nothing for the symptoms. The treatment provided no relief.        The following portions of the patient's history were reviewed and updated as appropriate: allergies, current medications, past family history, past medical history, past social history, past surgical history and problem list.    Allergies:  Allergies   Allergen Reactions   • Valsartan GI Intolerance     Chest pain, dizzy,        Social History:  Social History     Socioeconomic History   • Marital status:      Spouse name: Not on file   • Number of children: Not on file   • Years of education: Not on file   • Highest education level: Not on file   Tobacco Use   • Smoking status: Former Smoker   • Smokeless tobacco: Never Used   Substance and Sexual Activity   • Alcohol use: No     Frequency: Never   • Drug use: No       Family History:  Family History   Problem Relation Age of Onset   • Cancer Mother         Lung-smoker   • Cancer Father         lung-smoker   • Diabetes Father    • Heart disease Father    • Cancer Brother         Lung-smoker,  at 57   • Alcohol abuse Brother    • Heart disease Brother          MI at age 51 and  at 51   • Cancer Sister         Lung-smoker   • Cancer Sister    • Other Sister         MS  at 37   • Heart disease Sister    • Kidney disease Sister    • Liver disease Sister    • Cancer Sister         Lung-smoker       Past Medical History :  Patient Active Problem List   Diagnosis   • Mixed hyperlipidemia   • Malignant neoplasm of overlapping sites of bladder (CMS/HCC)   • Adenoma of liver   • History of ankle surgery   • Type 2 diabetes mellitus without complication, with long-term current use of insulin (CMS/HCC)   • Chronic bronchitis (CMS/HCC)   • Actinic keratosis   • Allergic rhinitis   • Benign prostatic hyperplasia   • Knee pain   • Lymphadenopathy   • Essential hypertension   • Class 1 obesity without serious comorbidity with body mass index (BMI) of 31.0 to 31.9 in adult   • Fatigue   • Left lower quadrant abdominal pain   • Preventative health care   • Screening for colon cancer   • Benign prostatic hyperplasia with urinary obstruction   • Insulin resistance   • Acute headache       Medication List:    Current Outpatient Medications:   •  albuterol sulfate  (90 Base) MCG/ACT inhaler, Inhale 2 puffs Every 4 (Four) Hours As Needed for Wheezing., Disp: , Rfl:   •  amLODIPine (NORVASC) 10 MG tablet, Take 1 tablet by mouth Daily. For blood pressure, Disp: 90 tablet, Rfl: 0  •  atorvastatin (LIPITOR) 80 MG tablet, TAKE 1 TABLET BY MOUTH EVERYDAY AT BEDTIME, Disp: 86 tablet, Rfl: 1  •  ipratropium (ATROVENT) 0.06 % nasal spray, INSTILL TWO SPRAYS INTO EACH NOSTRIL TWICE DAILY, Disp: , Rfl:   •  losartan (COZAAR) 100 MG tablet, Take 50 mg by mouth Daily., Disp: , Rfl:   •  metFORMIN (GLUCOPHAGE) 500 MG tablet, TAKE 1 TABLET BY MOUTH TWICE A DAY, Disp: 180 tablet, Rfl: 0  •  Multiple Vitamins-Minerals (CENTRUM SILVER 50+MEN PO), Take  by mouth., Disp: , Rfl:   •  Omega-3 Fatty Acids (FISH OIL) 1000 MG capsule capsule, Take 2,000 mg by mouth Daily With Breakfast.,  "Disp: , Rfl:   •  Saw Palmetto-Phytosterols (PROSTATE SR PO), Take  by mouth., Disp: , Rfl:   •  vitamin C (ASCORBIC ACID) 500 MG tablet, Take 500 mg by mouth Daily., Disp: , Rfl:   •  Cinnamon 500 MG capsule, Take 500 mg by mouth Daily., Disp: , Rfl:   •  umeclidinium-vilanterol (ANORO ELLIPTA) 62.5-25 MCG/INH aerosol powder  inhaler, Inhale 1 puff Daily., Disp: , Rfl:     Past Surgical History:  Past Surgical History:   Procedure Laterality Date   • ANKLE FUSION     • COLONOSCOPY      2016   • CYST REMOVAL     • HERNIA REPAIR      2016   • SHOULDER SURGERY         Review of Systems:  Review of Systems   Constitutional: Negative for activity change and fever.   HENT: Negative for ear pain, hearing loss, rhinorrhea, sinus pressure and voice change.    Eyes: Negative for blurred vision, photophobia and visual disturbance.   Respiratory: Negative for cough and shortness of breath.    Cardiovascular: Negative for chest pain.   Gastrointestinal: Negative for abdominal pain, diarrhea, nausea and vomiting.   Endocrine: Negative for cold intolerance and heat intolerance.   Genitourinary: Negative for frequency and urgency.   Musculoskeletal: Negative for arthralgias and back pain.   Skin: Negative for rash.   Neurological: Positive for numbness. Negative for dizziness, tingling, seizures, syncope, weakness and loss of balance.   Hematological: Does not bruise/bleed easily.   Psychiatric/Behavioral: Negative for depressed mood. The patient is not nervous/anxious.        Physical Exam:  Vital Signs:  Visit Vitals  /76 (BP Location: Right arm)   Pulse 60   Temp 98.1 °F (36.7 °C) (Oral)   Resp 16   Ht 177.8 cm (70\")   Wt 103 kg (226 lb 6.4 oz)   SpO2 98%   BMI 32.49 kg/m²       Physical Exam   Constitutional: He is oriented to person, place, and time. He appears well-developed and well-nourished. He is cooperative.   HENT:   Head: Normocephalic and atraumatic.   Right Ear: External ear normal. Tympanic membrane is not " injected, not erythematous, not retracted and not bulging. No middle ear effusion.   Left Ear: External ear normal. Tympanic membrane is not injected, not erythematous, not retracted and not bulging.  No middle ear effusion.   Nose: Nose normal. No rhinorrhea.   Mouth/Throat: Oropharynx is clear and moist. No oropharyngeal exudate.   Eyes: Pupils are equal, round, and reactive to light. Conjunctivae and EOM are normal. Right eye exhibits no discharge. Left eye exhibits no discharge. No scleral icterus.   Neck: Normal range of motion. Neck supple.   Cardiovascular: Normal rate, regular rhythm and normal heart sounds. Exam reveals no gallop and no friction rub.   No murmur heard.  Pulmonary/Chest: Effort normal and breath sounds normal. No respiratory distress. He has no wheezes. He has no rales.   Lymphadenopathy:     He has no cervical adenopathy.   Neurological: He is alert and oriented to person, place, and time. He displays normal reflexes. No cranial nerve deficit or sensory deficit. He exhibits normal muscle tone. Coordination normal.   Skin: Skin is warm and dry.   Psychiatric: He has a normal mood and affect.   Vitals reviewed.      Assessment and Plan:  Problem List Items Addressed This Visit        Nervous and Auditory    Acute headache - Primary    Overview     CT scan was negative. Discussed nsaids  Dicussed if there is loss of vision, confusion, weakness or numbness in an extremity, dropping of an eyelid or one side of the face, severe pain, intractable vomiting, go to the ER           Relevant Orders    CT Head Without Contrast (Completed)        Follow up next week with Dr Ballard    An After Visit Summary and PPPS were given to the patient.             I wore protective equipment throughout this patient encounter to include mask and gloves. Hand hygiene was performed before donning protective equipment and after removal when leaving the room.

## 2020-07-16 ENCOUNTER — CLINICAL SUPPORT (OUTPATIENT)
Dept: FAMILY MEDICINE CLINIC | Facility: CLINIC | Age: 70
End: 2020-07-16

## 2020-07-16 DIAGNOSIS — J30.1 SEASONAL ALLERGIC RHINITIS DUE TO POLLEN: Primary | ICD-10-CM

## 2020-07-16 PROCEDURE — 95117 IMMUNOTHERAPY INJECTIONS: CPT | Performed by: FAMILY MEDICINE

## 2020-07-24 ENCOUNTER — CLINICAL SUPPORT (OUTPATIENT)
Dept: FAMILY MEDICINE CLINIC | Facility: CLINIC | Age: 70
End: 2020-07-24

## 2020-07-24 DIAGNOSIS — J30.1 SEASONAL ALLERGIC RHINITIS DUE TO POLLEN: Primary | ICD-10-CM

## 2020-07-24 PROCEDURE — 95117 IMMUNOTHERAPY INJECTIONS: CPT | Performed by: FAMILY MEDICINE

## 2020-07-30 ENCOUNTER — CLINICAL SUPPORT (OUTPATIENT)
Dept: FAMILY MEDICINE CLINIC | Facility: CLINIC | Age: 70
End: 2020-07-30

## 2020-07-30 DIAGNOSIS — J30.1 SEASONAL ALLERGIC RHINITIS DUE TO POLLEN: Primary | ICD-10-CM

## 2020-07-30 PROCEDURE — 95117 IMMUNOTHERAPY INJECTIONS: CPT | Performed by: FAMILY MEDICINE

## 2020-08-05 ENCOUNTER — OFFICE VISIT (OUTPATIENT)
Dept: FAMILY MEDICINE CLINIC | Facility: CLINIC | Age: 70
End: 2020-08-05

## 2020-08-05 VITALS
BODY MASS INDEX: 29.4 KG/M2 | DIASTOLIC BLOOD PRESSURE: 74 MMHG | WEIGHT: 221.8 LBS | HEART RATE: 53 BPM | RESPIRATION RATE: 18 BRPM | SYSTOLIC BLOOD PRESSURE: 126 MMHG | TEMPERATURE: 97.5 F | HEIGHT: 73 IN | OXYGEN SATURATION: 95 %

## 2020-08-05 DIAGNOSIS — R53.82 CHRONIC FATIGUE: ICD-10-CM

## 2020-08-05 DIAGNOSIS — J30.1 SEASONAL ALLERGIC RHINITIS DUE TO POLLEN: ICD-10-CM

## 2020-08-05 DIAGNOSIS — D13.4 ADENOMA OF LIVER: ICD-10-CM

## 2020-08-05 DIAGNOSIS — R51.9 ACUTE INTRACTABLE HEADACHE, UNSPECIFIED HEADACHE TYPE: Primary | ICD-10-CM

## 2020-08-05 DIAGNOSIS — E11.9 TYPE 2 DIABETES MELLITUS WITHOUT COMPLICATION, WITH LONG-TERM CURRENT USE OF INSULIN (HCC): ICD-10-CM

## 2020-08-05 DIAGNOSIS — I10 ESSENTIAL HYPERTENSION: ICD-10-CM

## 2020-08-05 DIAGNOSIS — Z12.5 SCREENING FOR PROSTATE CANCER: ICD-10-CM

## 2020-08-05 DIAGNOSIS — E55.9 VITAMIN D DEFICIENCY: ICD-10-CM

## 2020-08-05 DIAGNOSIS — E78.2 MIXED HYPERLIPIDEMIA: ICD-10-CM

## 2020-08-05 DIAGNOSIS — Z79.4 TYPE 2 DIABETES MELLITUS WITHOUT COMPLICATION, WITH LONG-TERM CURRENT USE OF INSULIN (HCC): ICD-10-CM

## 2020-08-05 PROCEDURE — 99213 OFFICE O/P EST LOW 20 MIN: CPT | Performed by: FAMILY MEDICINE

## 2020-08-05 NOTE — ASSESSMENT & PLAN NOTE
Patient CT scan was negative.  Patient reports that the pain does not last longer than 4 minutes.  Due to the duration of pain he was encouraged to continue to monitor symptoms and keep a diary.  Patient declined to start Topamax to prevent the headaches.

## 2020-08-05 NOTE — PROGRESS NOTES
Chief Complaint   Patient presents with   • Headache       History of Present Illness:  Subjective   Herson Ruby is a 69 y.o. male.   Headache    This is a recurrent problem. The current episode started in the past 7 days. The problem occurs intermittently. The pain is located in the left unilateral region. The pain does not radiate. The pain quality is not similar to prior headaches. The quality of the pain is described as sharp (and burning). Associated symptoms include eye pain, numbness, scalp tenderness, sinus pressure and a visual change. Pertinent negatives include no abdominal pain, abnormal behavior, anorexia, back pain, blurred vision, coughing, dizziness, drainage, ear pain, eye redness, eye watering, facial sweating, fever, hearing loss, insomnia, loss of balance, muscle aches, nausea, neck pain, phonophobia, photophobia, rhinorrhea, seizures, sore throat, swollen glands, tingling, tinnitus, vomiting, weakness or weight loss. Associated symptoms comments: 7/13/2020- This is a recurrent problem. The current episode started in the past 7 days. The problem occurs intermittently. The problem has been gradually worsening. The pain is located in the left unilateral region. The pain does not radiate. The pain quality is not similar to prior headaches. The quality of the pain is described as sharp (and burning). The pain is mild. Associated symptoms include numbness and scalp tenderness. Pertinent negatives include no abdominal pain, back pain, blurred vision, coughing, dizziness, ear pain, facial sweating, fever, hearing loss, loss of balance, nausea, phonophobia, photophobia, rhinorrhea, seizures, sinus pressure, tingling, vomiting or weakness. Nothing aggravates the symptoms. He has tried nothing for the symptoms. The treatment provided no relief.   8/5/2020- The patient is here today and states that his symptoms is not getting worse. He states that the area he was having issues with has not gotten better but  he states that the increase in his headaches which he is not used to getting has gotten a lot worse. He states that he is getting about 3-4 headaches a day and he states that he states that they are usually 3-5 minutes and he states that it will go away. He states that he is getting these almost daily and he states that there are some days this is a lot worse and he states that there are some days that it really gets bad for him. He had a Ct done the last time he was here with Dr. Delaney. Impression is below.   Impression:   CT HEAD WO CONTRAST-  Date of Exam: 7/13/2020 2:10 PM  Indication: Headache, acute, severe, thunderclap, worst HA of life;  [id number].  Stabbing pain within the left temporal region for 9 days     Comparison: None available.     Technique:  Without contrast, contiguous axial CT images of the head  were obtained from skull base to vertex.  Coronal and sagittal  reconstructions were performed.  Automated exposure control and  iterative reconstruction methods were used.    FINDINGS  No evidence of intracranial hemorrhage, mass, or midline shift. The  ventricles appear normal in size for the patient's age. No extra-axial  collections identified. The gray white matter differentiation is intact.  No air-fluid levels identified within the paranasal sinuses. The  extra-axial structures demonstrate no acute process.   IMPRESSION:  No evidence of hemorrhage, mass effect or midline shift. No acute  process identified..        Allergies:  Allergies   Allergen Reactions   • Valsartan GI Intolerance     Chest pain, dizzy,        Social History:  Social History     Socioeconomic History   • Marital status:      Spouse name: Not on file   • Number of children: Not on file   • Years of education: Not on file   • Highest education level: Not on file   Tobacco Use   • Smoking status: Former Smoker   • Smokeless tobacco: Never Used   Substance and Sexual Activity   • Alcohol use: No     Frequency: Never    • Drug use: No       Family History:  Family History   Problem Relation Age of Onset   • Cancer Mother         Lung-smoker   • Cancer Father         lung-smoker   • Diabetes Father    • Heart disease Father    • Cancer Brother         Lung-smoker,  at 57   • Alcohol abuse Brother    • Heart disease Brother         MI at age 51 and  at 51   • Cancer Sister         Lung-smoker   • Cancer Sister    • Other Sister         MS  at 37   • Heart disease Sister    • Kidney disease Sister    • Liver disease Sister    • Cancer Sister         Lung-smoker       Past Medical History :  Active Ambulatory Problems     Diagnosis Date Noted   • Mixed hyperlipidemia 10/02/2019   • Malignant neoplasm of overlapping sites of bladder (CMS/HCC) 10/02/2019   • Adenoma of liver 10/02/2019   • History of ankle surgery 10/02/2019   • Type 2 diabetes mellitus without complication, with long-term current use of insulin (CMS/[facility]) 10/02/2019   • Chronic bronchitis (CMS/[facility]) 2019   • Actinic keratosis 2020   • Allergic rhinitis 2020   • Benign prostatic hyperplasia 2020   • Knee pain 2020   • Lymphadenopathy 2020   • Essential hypertension 2020   • Class 1 obesity without serious comorbidity with body mass index (BMI) of 31.0 to 31.9 in adult 2019   • Fatigue 2019   • Left lower quadrant abdominal pain 2020   • Preventative health care 2019   • Screening for colon cancer 2019   • Benign prostatic hyperplasia with urinary obstruction 2020   • Insulin resistance 2019   • Acute headache 2020     Resolved Ambulatory Problems     Diagnosis Date Noted   • Essential hypertension 10/02/2019   • Acute pharyngitis 2020   • Acute recurrent maxillary sinusitis 2019   • Acute sinusitis 2020   • Diabetes mellitus (CMS/HCC) 2020   • Hyperlipidemia 2020   • Acute pharyngitis 2020   • Acute sinusitis 2020      Past Medical History:   Diagnosis Date   • Allergic    • Bladder cancer (CMS/[facility])    • COPD (chronic obstructive pulmonary disease) (CMS/[facility])    • Diverticulosis    • GERD (gastroesophageal reflux disease)    • Hypertension        Medication List:  Outpatient Encounter Medications as of 8/5/2020   Medication Sig Dispense Refill   • albuterol sulfate  (90 Base) MCG/ACT inhaler Inhale 2 puffs Every 4 (Four) Hours As Needed for Wheezing.     • amLODIPine (NORVASC) 10 MG tablet Take 1 tablet by mouth Daily. For blood pressure 90 tablet 0   • atorvastatin (LIPITOR) 80 MG tablet TAKE 1 TABLET BY MOUTH EVERYDAY AT BEDTIME 86 tablet 1   • ipratropium (ATROVENT) 0.06 % nasal spray INSTILL TWO SPRAYS INTO EACH NOSTRIL TWICE DAILY     • losartan (COZAAR) 100 MG tablet Take 50 mg by mouth Daily.     • metFORMIN (GLUCOPHAGE) 500 MG tablet TAKE 1 TABLET BY MOUTH TWICE A  tablet 0   • Multiple Vitamins-Minerals (CENTRUM SILVER 50+MEN PO) Take  by mouth.     • Omega-3 Fatty Acids (FISH OIL) 1000 MG capsule capsule Take 2,000 mg by mouth Daily With Breakfast.     • Saw Palmetto-Phytosterols (PROSTATE SR PO) Take  by mouth.     • vitamin C (ASCORBIC ACID) 500 MG tablet Take 500 mg by mouth Daily.     • [DISCONTINUED] Cinnamon 500 MG capsule Take 500 mg by mouth Daily.     • [DISCONTINUED] umeclidinium-vilanterol (ANORO ELLIPTA) 62.5-25 MCG/INH aerosol powder  inhaler Inhale 1 puff Daily.       Facility-Administered Encounter Medications as of 8/5/2020   Medication Dose Route Frequency Provider Last Rate Last Dose   • [COMPLETED] Allergy Serum Injection  0.2 mL Subcutaneous Once Chalino Ballard Sr., MD   0.2 mL at 08/05/20 1154   • [COMPLETED] Allergy Serum Injection  0.2 mL Subcutaneous Once Chalino Ballard Sr., MD   0.2 mL at 08/05/20 1154       Past Surgical History:  Past Surgical History:   Procedure Laterality Date   • ANKLE FUSION     • COLONOSCOPY      2016   • CYST REMOVAL     • HERNIA REPAIR      2016   •  "SHOULDER SURGERY          The following portions of the patient's history were reviewed and updated as appropriate: allergies, current medications, past family history, past medical history, past social history, past surgical history and problem list.    Review Of Systems:  Review of Systems   Constitutional: Negative for activity change, appetite change, fatigue, fever and unexpected weight loss.   HENT: Positive for sinus pressure. Negative for ear discharge, ear pain, hearing loss, postnasal drip, rhinorrhea, sore throat, swollen glands and tinnitus.    Eyes: Positive for pain. Negative for blurred vision, double vision, photophobia, discharge and redness.   Respiratory: Negative for cough, chest tightness and shortness of breath.    Cardiovascular: Negative for chest pain.   Gastrointestinal: Negative for abdominal pain, anorexia, nausea and vomiting.   Endocrine: Negative for cold intolerance and heat intolerance.   Musculoskeletal: Negative for back pain, gait problem, joint swelling and neck pain.   Skin: Negative for color change and rash.   Neurological: Positive for numbness and headache. Negative for dizziness, tingling, seizures, facial asymmetry, weakness, loss of balance and confusion.   Psychiatric/Behavioral: Negative for behavioral problems. The patient does not have insomnia.        Objective     Physical Exam:  Vital Signs:  Visit Vitals  /74   Pulse 53   Temp 97.5 °F (36.4 °C)   Resp 18   Ht 185.4 cm (73\")   Wt 101 kg (221 lb 12.8 oz)   SpO2 95%   BMI 29.26 kg/m²       Physical Exam   Constitutional: He is oriented to person, place, and time. He appears well-developed and well-nourished.   HENT:   Head: Normocephalic.   Right Ear: External ear normal.   Left Ear: External ear normal.   Nose: Nose normal.   Eyes: Conjunctivae are normal.   Neck: Normal range of motion. Neck supple.   Cardiovascular: Normal rate and regular rhythm.   Pulmonary/Chest: Effort normal and breath sounds normal. "   Musculoskeletal: Normal range of motion.   Neurological: He is alert and oriented to person, place, and time.   Skin: Skin is warm and dry. Capillary refill takes less than 2 seconds.   Vitals reviewed.      Assessment/Plan   Assessment and Plan:  Diagnoses and all orders for this visit:    1. Acute intractable headache, unspecified headache type (Primary)  Assessment & Plan:  Patient CT scan was negative.  Patient reports that the pain does not last longer than 4 minutes.  Due to the duration of pain he was encouraged to continue to monitor symptoms and keep a diary.  Patient declined to start Topamax to prevent the headaches.      2. Mixed hyperlipidemia  -     Lipid Panel With / Chol / HDL Ratio    3. Essential hypertension  -     CBC & Differential  -     Comprehensive Metabolic Panel    4. Type 2 diabetes mellitus without complication, with long-term current use of insulin (CMS/[facility])  -     Hemoglobin A1c    5. Chronic fatigue  -     TSH  -     Vitamin B12  -     Folate    6. Screening for prostate cancer  -     PSA Screen    7. Adenoma of liver    8. Seasonal allergic rhinitis due to pollen  -     Allergy Serum Injection  -     Allergy Serum Injection    9. Vitamin D deficiency  -     Vitamin D 25 Hydroxy                     Answers for HPI/ROS submitted by the patient on 8/3/2020   What is the primary reason for your visit?: Other  Please describe your symptoms.: shooting pain in head on left front side., plus starting to get pain in other spots on in head  Have you had these symptoms before?: Yes  How long have you been having these symptoms?: Greater than 2 weeks  Please list any medications you are currently taking for this condition.: none  Please describe any probable cause for these symptoms. : Debilitating.    life stops till pain subsides.

## 2020-08-07 LAB
25(OH)D3+25(OH)D2 SERPL-MCNC: 59.4 NG/ML (ref 30–100)
ALBUMIN SERPL-MCNC: 3.6 G/DL (ref 3.8–4.8)
ALBUMIN/GLOB SERPL: 1.2 {RATIO} (ref 1.2–2.2)
ALP SERPL-CCNC: 61 IU/L (ref 39–117)
ALT SERPL-CCNC: 43 IU/L (ref 0–44)
AST SERPL-CCNC: 34 IU/L (ref 0–40)
BASOPHILS # BLD AUTO: 0.1 X10E3/UL (ref 0–0.2)
BASOPHILS NFR BLD AUTO: 1 %
BILIRUB SERPL-MCNC: 0.7 MG/DL (ref 0–1.2)
BUN SERPL-MCNC: 12 MG/DL (ref 8–27)
BUN/CREAT SERPL: 11 (ref 10–24)
CALCIUM SERPL-MCNC: 9.2 MG/DL (ref 8.6–10.2)
CHLORIDE SERPL-SCNC: 100 MMOL/L (ref 96–106)
CHOLEST SERPL-MCNC: 131 MG/DL (ref 100–199)
CHOLEST/HDLC SERPL: 2.6 RATIO (ref 0–5)
CO2 SERPL-SCNC: 28 MMOL/L (ref 20–29)
CREAT SERPL-MCNC: 1.12 MG/DL (ref 0.76–1.27)
EOSINOPHIL # BLD AUTO: 0.2 X10E3/UL (ref 0–0.4)
EOSINOPHIL NFR BLD AUTO: 2 %
ERYTHROCYTE [DISTWIDTH] IN BLOOD BY AUTOMATED COUNT: 12.2 % (ref 11.6–15.4)
FOLATE SERPL-MCNC: 19.4 NG/ML
GLOBULIN SER CALC-MCNC: 2.9 G/DL (ref 1.5–4.5)
GLUCOSE SERPL-MCNC: 101 MG/DL (ref 65–99)
HBA1C MFR BLD: 5.9 % (ref 4.8–5.6)
HCT VFR BLD AUTO: 46.5 % (ref 37.5–51)
HDLC SERPL-MCNC: 50 MG/DL
HGB BLD-MCNC: 15.6 G/DL (ref 13–17.7)
IMM GRANULOCYTES # BLD AUTO: 0 X10E3/UL (ref 0–0.1)
IMM GRANULOCYTES NFR BLD AUTO: 0 %
LDLC SERPL CALC-MCNC: 64 MG/DL (ref 0–99)
LYMPHOCYTES # BLD AUTO: 1.8 X10E3/UL (ref 0.7–3.1)
LYMPHOCYTES NFR BLD AUTO: 28 %
MCH RBC QN AUTO: 31 PG (ref 26.6–33)
MCHC RBC AUTO-ENTMCNC: 33.5 G/DL (ref 31.5–35.7)
MCV RBC AUTO: 92 FL (ref 79–97)
MONOCYTES # BLD AUTO: 0.5 X10E3/UL (ref 0.1–0.9)
MONOCYTES NFR BLD AUTO: 8 %
NEUTROPHILS # BLD AUTO: 3.8 X10E3/UL (ref 1.4–7)
NEUTROPHILS NFR BLD AUTO: 61 %
PLATELET # BLD AUTO: 237 X10E3/UL (ref 150–450)
POTASSIUM SERPL-SCNC: 4.4 MMOL/L (ref 3.5–5.2)
PROT SERPL-MCNC: 6.5 G/DL (ref 6–8.5)
PSA SERPL-MCNC: 1.9 NG/ML (ref 0–4)
RBC # BLD AUTO: 5.03 X10E6/UL (ref 4.14–5.8)
SODIUM SERPL-SCNC: 140 MMOL/L (ref 134–144)
TRIGL SERPL-MCNC: 85 MG/DL (ref 0–149)
TSH SERPL DL<=0.005 MIU/L-ACNC: 1.6 UIU/ML (ref 0.45–4.5)
VIT B12 SERPL-MCNC: 589 PG/ML (ref 232–1245)
VLDLC SERPL CALC-MCNC: 17 MG/DL (ref 5–40)
WBC # BLD AUTO: 6.3 X10E3/UL (ref 3.4–10.8)

## 2020-08-12 ENCOUNTER — CLINICAL SUPPORT (OUTPATIENT)
Dept: FAMILY MEDICINE CLINIC | Facility: CLINIC | Age: 70
End: 2020-08-12

## 2020-08-12 DIAGNOSIS — J30.1 SEASONAL ALLERGIC RHINITIS DUE TO POLLEN: Primary | ICD-10-CM

## 2020-08-12 DIAGNOSIS — I10 ESSENTIAL HYPERTENSION: ICD-10-CM

## 2020-08-12 PROCEDURE — 95117 IMMUNOTHERAPY INJECTIONS: CPT | Performed by: FAMILY MEDICINE

## 2020-08-12 RX ORDER — AMLODIPINE BESYLATE 10 MG/1
10 TABLET ORAL DAILY
Qty: 90 TABLET | Refills: 0 | Status: SHIPPED | OUTPATIENT
Start: 2020-08-12 | End: 2020-10-22

## 2020-08-12 RX ORDER — LOSARTAN POTASSIUM 100 MG/1
50 TABLET ORAL DAILY
Qty: 90 TABLET | Refills: 0 | Status: SHIPPED | OUTPATIENT
Start: 2020-08-12 | End: 2021-02-11

## 2020-08-19 ENCOUNTER — OFFICE VISIT (OUTPATIENT)
Dept: FAMILY MEDICINE CLINIC | Facility: CLINIC | Age: 70
End: 2020-08-19

## 2020-08-19 VITALS
OXYGEN SATURATION: 96 % | DIASTOLIC BLOOD PRESSURE: 81 MMHG | TEMPERATURE: 97.5 F | SYSTOLIC BLOOD PRESSURE: 139 MMHG | BODY MASS INDEX: 29.26 KG/M2 | WEIGHT: 220.8 LBS | HEART RATE: 61 BPM | HEIGHT: 73 IN | RESPIRATION RATE: 18 BRPM

## 2020-08-19 DIAGNOSIS — Z29.8 IMMUNOTHERAPY: ICD-10-CM

## 2020-08-19 DIAGNOSIS — Z00.00 MEDICARE ANNUAL WELLNESS VISIT, SUBSEQUENT: Primary | ICD-10-CM

## 2020-08-19 DIAGNOSIS — J30.1 SEASONAL ALLERGIC RHINITIS DUE TO POLLEN: ICD-10-CM

## 2020-08-19 PROCEDURE — 99213 OFFICE O/P EST LOW 20 MIN: CPT | Performed by: FAMILY MEDICINE

## 2020-08-19 NOTE — ASSESSMENT & PLAN NOTE
Medicare wellness completed on 8/19/2020.  Discussed advanced care directives.  In the process of finding patient's old records to determine if he needs a AAA screening and colonoscopy.

## 2020-08-19 NOTE — PROGRESS NOTES
QUICK REFERENCE INFORMATION:  The ABCs of the Annual Wellness Visit    Subsequent Medicare Wellness Visit     HEALTH RISK ASSESSMENT    : 1950    Recent Hospitalizations:  No hospitalization(s) within the last year..  ccc    Current Medical Providers:  Patient Care Team:  Chalino Ballard Sr., MD as PCP - General (Family Medicine)    Smoking Status:  Social History     Tobacco Use   Smoking Status Former Smoker   Smokeless Tobacco Never Used       Alcohol Consumption:  Social History     Substance and Sexual Activity   Alcohol Use No   • Frequency: Never       Depression Screen:   PHQ-2/PHQ-9 Depression Screening 2020   Little interest or pleasure in doing things 0   Feeling down, depressed, or hopeless 0   Total Score 0       Health Habits and Functional and Cognitive Screening:  Functional & Cognitive Status 2020   Do you have difficulty preparing food and eating? No   Do you have difficulty bathing yourself, getting dressed or grooming yourself? No   Do you have difficulty using the toilet? No   Do you have difficulty moving around from place to place? No   Do you have trouble with steps or getting out of a bed or a chair? No   Current Diet Well Balanced Diet   Dental Exam Up to date   Eye Exam Up to date   Current Exercise Activities Include Walking   Do you need help using the phone?  No   Are you deaf or do you have serious difficulty hearing?  No   Do you need help with transportation? No   Do you need help shopping? No   Do you need help preparing meals?  No   Do you need help with housework?  No   Do you need help with laundry? No   Do you need help taking your medications? No   Do you need help managing money? No   Do you ever drive or ride in a car without wearing a seat belt? No   Have you felt unusual stress, anger or loneliness in the last month? No   Who do you live with? Spouse   If you need help, do you have trouble finding someone available to you? No   Have you been bothered in the  last four weeks by sexual problems? No   Do you have difficulty concentrating, remembering or making decisions? No       Does the patient have evidence of cognitive impairment? No    Mini-Mental State Examination (MMSE)        Instructions: Ask the questions in the order listed. Score one point for each correct response within each question or activity.      Maximum Score  Patient’s Score  Questions    5   5 “What is the year?  Season?  Date?  Day of the week?  Month?”    5   5 “Where are we now: State?  County?  Town/city?  Hospital?  Floor?”    3  3  The examiner names three unrelated objects clearly and slowly, then asks the patient to name all three of them. The patient’s response is used for scoring. The examiner repeats them until patient learns all of them, if possible. Number of trials: ___________    5   3 “I would like you to count backward from 100 by sevens.” (93, 86, 79, 72, 65, …) Stop after five answers.   Alternative: “Spell WORLD backwards.” ([id number])    3   1 “Earlier I told you the names of three things. Can you tell me what those were?”    2   3 Show the patient two simple objects, such as a wristwatch and a pencil, and ask the patient to name them.    1   1 “Repeat the phrase: ‘No ifs, ands, or buts.’”    3  3  “Take the paper in your right hand, fold it in half, and put it on the floor.”   (The examiner gives the patient a piece of blank paper.)    1  1  “Please read this and do what it says.” (Written instruction is “Close your eyes.”)    1  1  “Make up and write a sentence about anything.” (This sentence must contain a noun and a verb.)    1   1 “Please copy this picture.” (The examiner gives the patient a blank piece of paper and asks him/her to draw the symbol below. All 10 angles must be present and two must intersect.)             30   26 TOTAL          Aspirin use counseling: Does not need ASA (and currently is not on it)    Recent Lab Results:    Lab Results   Component Value Date      (H) 2020     Lab Results   Component Value Date    HGBA1C 5.9 (H) 2020     Lab Results   Component Value Date    TRIG 85 2020    HDL 50 2020    VLDL 17 2020       Age-appropriate Screening Schedule:  Refer to the list below for future screening recommendations based on patient's age, sex and/or medical conditions. Orders for these recommended tests are listed in the plan section. The patient has been provided with a written plan.    Health Maintenance   Topic Date Due   • URINE MICROALBUMIN  1950   • TDAP/TD VACCINES (1 - Tdap) 1960   • ZOSTER VACCINE (2 of 3) 2013   • INFLUENZA VACCINE  2020   • HEMOGLOBIN A1C  2021   • LIPID PANEL  2021   • DIABETIC EYE EXAM  08/10/2021   • DIABETIC FOOT EXAM  2021   • COLONOSCOPY  2027        Subjective   History of Present Illness:  Herson Ruby is a 69 y.o. male who presents for an Annual Wellness Visit.  Compared to one year ago, the patient feels his physical health is better.  Compared to one year ago, the patient feels his mental health is the same.  History of Present Illness     Allergies:  Allergies   Allergen Reactions   • Valsartan GI Intolerance     Chest pain, dizzy,        Social History:  Social History     Socioeconomic History   • Marital status:      Spouse name: Not on file   • Number of children: Not on file   • Years of education: Not on file   • Highest education level: Not on file   Tobacco Use   • Smoking status: Former Smoker   • Smokeless tobacco: Never Used   Substance and Sexual Activity   • Alcohol use: No     Frequency: Never   • Drug use: No       Family History:  Family History   Problem Relation Age of Onset   • Cancer Mother         Lung-smoker   • Cancer Father         lung-smoker   • Diabetes Father    • Heart disease Father    • Cancer Brother         Lung-smoker,  at 57   • Alcohol abuse Brother    • Heart disease Brother         MI at age 51  and  at 51   • Cancer Sister         Lung-smoker   • Cancer Sister    • Other Sister         MS  at 37   • Heart disease Sister    • Kidney disease Sister    • Liver disease Sister    • Cancer Sister         Lung-smoker       Past Medical History :  Active Ambulatory Problems     Diagnosis Date Noted   • Mixed hyperlipidemia 10/02/2019   • Malignant neoplasm of overlapping sites of bladder (CMS/HCC) 10/02/2019   • Adenoma of liver 10/02/2019   • History of ankle surgery 10/02/2019   • Type 2 diabetes mellitus without complication, with long-term current use of insulin (CMS/HCC) 10/02/2019   • Chronic bronchitis (CMS/HCC) 2019   • Actinic keratosis 2020   • Allergic rhinitis 2020   • Benign prostatic hyperplasia 2020   • Knee pain 2020   • Lymphadenopathy 2020   • Essential hypertension 2020   • Class 1 obesity without serious comorbidity with body mass index (BMI) of 31.0 to 31.9 in adult 2019   • Fatigue 2019   • Left lower quadrant abdominal pain 2020   • Preventative health care 2019   • Screening for colon cancer 2019   • Benign prostatic hyperplasia with urinary obstruction 2020   • Insulin resistance 2019   • Acute headache 2020   • Medicare annual wellness visit, subsequent 2020     Resolved Ambulatory Problems     Diagnosis Date Noted   • Essential hypertension 10/02/2019   • Acute pharyngitis 2020   • Acute recurrent maxillary sinusitis 2019   • Acute sinusitis 2020   • Diabetes mellitus (CMS/HCC) 2020   • Hyperlipidemia 2020   • Acute pharyngitis 2020   • Acute sinusitis 2020     Past Medical History:   Diagnosis Date   • Allergic    • Bladder cancer (CMS/HCC)    • COPD (chronic obstructive pulmonary disease) (CMS/HCC)    • Diverticulosis    • GERD (gastroesophageal reflux disease)    • Hypertension        Medication List:  Outpatient Encounter  Medications as of 8/19/2020   Medication Sig Dispense Refill   • albuterol sulfate  (90 Base) MCG/ACT inhaler Inhale 2 puffs Every 4 (Four) Hours As Needed for Wheezing.     • amLODIPine (NORVASC) 10 MG tablet Take 1 tablet by mouth Daily. For blood pressure 90 tablet 0   • atorvastatin (LIPITOR) 80 MG tablet TAKE 1 TABLET BY MOUTH EVERYDAY AT BEDTIME 86 tablet 1   • ipratropium (ATROVENT) 0.06 % nasal spray INSTILL TWO SPRAYS INTO EACH NOSTRIL TWICE DAILY     • losartan (COZAAR) 100 MG tablet Take 0.5 tablets by mouth Daily. 90 tablet 0   • metFORMIN (GLUCOPHAGE) 500 MG tablet TAKE 1 TABLET BY MOUTH TWICE A  tablet 0   • Multiple Vitamins-Minerals (CENTRUM SILVER 50+MEN PO) Take  by mouth.     • Omega-3 Fatty Acids (FISH OIL) 1000 MG capsule capsule Take 2,000 mg by mouth Daily With Breakfast.     • Saw Palmetto-Phytosterols (PROSTATE SR PO) Take  by mouth.     • vitamin C (ASCORBIC ACID) 500 MG tablet Take 500 mg by mouth Daily.       Facility-Administered Encounter Medications as of 8/19/2020   Medication Dose Route Frequency Provider Last Rate Last Dose   • [COMPLETED] Allergy Serum Injection  0.3 mL Subcutaneous Once Chalino Ballard Sr., MD   0.3 mL at 08/19/20 1206   • [COMPLETED] Allergy Serum Injection  0.3 mL Subcutaneous Once Chalino Ballard Sr., MD   0.3 mL at 08/19/20 1205     Reviewed use of high risk medication in the elderly: yes  Reviewed for potential of harmful drug interactions in the elderly: yes    Past Surgical History:  Past Surgical History:   Procedure Laterality Date   • ANKLE FUSION     • COLONOSCOPY      2016   • CYST REMOVAL     • HERNIA REPAIR      2016   • SHOULDER SURGERY          The following portions of the patient's history were reviewed and updated as appropriate: allergies, current medications, past family history, past medical history, past social history, past surgical history and problem list.    Review Of Systems:  Review of Systems   Constitutional: Negative for  "activity change, appetite change and fatigue.   HENT: Negative for congestion, postnasal drip, sinus pressure and sore throat.    Eyes: Negative for blurred vision and itching.   Respiratory: Negative for cough, shortness of breath and wheezing.    Cardiovascular: Negative for chest pain.   Gastrointestinal: Negative for abdominal pain, constipation, nausea, vomiting and GERD.   Endocrine: Negative for cold intolerance and heat intolerance.   Genitourinary: Negative for difficulty urinating, dysuria and urinary incontinence.   Musculoskeletal: Negative for back pain, joint swelling and neck pain.   Skin: Negative for color change and rash.   Neurological: Negative for dizziness, speech difficulty, weakness and memory problem.   Psychiatric/Behavioral: Negative for behavioral problems, decreased concentration, suicidal ideas and depressed mood.     I have reviewed and confirmed the accuracy of the ROS as documented by the MA/LPN/RN Chalino Ballard Sr, MD    Objective     Physical Exam:  Vital Signs:  Visit Vitals  /81   Pulse 61   Temp 97.5 °F (36.4 °C)   Resp 18   Ht 185.4 cm (73\")   Wt 100 kg (220 lb 12.8 oz)   SpO2 96%   BMI 29.13 kg/m²       Physical Exam   Constitutional: He is oriented to person, place, and time. He appears well-developed and well-nourished. He is active.   HENT:   Head: Normocephalic and atraumatic.   Nose: Nose normal.   Eyes: Pupils are equal, round, and reactive to light. Conjunctivae and lids are normal.   Neck: Normal range of motion. Neck supple.   Cardiovascular: Normal rate, regular rhythm, normal heart sounds and normal pulses.   Pulmonary/Chest: Effort normal and breath sounds normal. No respiratory distress.   Abdominal: Soft. Bowel sounds are normal.   Musculoskeletal: Normal range of motion.    Herson had a diabetic foot exam performed today.   During the foot exam he had a monofilament test performed.  Neurological: He is alert and oriented to person, place, and time.   Skin: " Skin is warm and dry. Capillary refill takes less than 2 seconds.   Psychiatric: He has a normal mood and affect. His behavior is normal. Judgment and thought content normal.   Vitals reviewed.  Feet: Diabetic foot exam performed during this visit.    Monofilament test performed  Right foot - number of sites tested - 10  Right foot - number of sites sensed - 10  Left foot number of sites tested 10  Left foot - number of sites sensed 10.    Right leg vibrations - normal  Left leg vibrations - normal  Right foot first MTP joint ROM is normal.  Left foot first MTP joint ROM is normal.  Vascular Status: right foot vasculature normal.  Left foot vasculature normal.  Right foot skin intact. Left foot skin intact.      Assessment/Plan    Recent Results (from the past 1344 hour(s))   CBC & Differential    Collection Time: 08/06/20  9:07 AM   Result Value Ref Range    WBC 6.3 3.4 - 10.8 x10E3/uL    RBC 5.03 4.14 - 5.80 x10E6/uL    Hemoglobin 15.6 13.0 - 17.7 g/dL    Hematocrit 46.5 37.5 - 51.0 %    MCV 92 79 - 97 fL    MCH 31.0 26.6 - 33.0 pg    MCHC 33.5 31.5 - 35.7 g/dL    RDW 12.2 11.6 - 15.4 %    Platelets 237 150 - 450 x10E3/uL    Neutrophil Rel % 61 Not Estab. %    Lymphocyte Rel % 28 Not Estab. %    Monocyte Rel % 8 Not Estab. %    Eosinophil Rel % 2 Not Estab. %    Basophil Rel % 1 Not Estab. %    Neutrophils Absolute 3.8 1.4 - 7.0 x10E3/uL    Lymphocytes Absolute 1.8 0.7 - 3.1 x10E3/uL    Monocytes Absolute 0.5 0.1 - 0.9 x10E3/uL    Eosinophils Absolute 0.2 0.0 - 0.4 x10E3/uL    Basophils Absolute 0.1 0.0 - 0.2 x10E3/uL    Immature Granulocyte Rel % 0 Not Estab. %    Immature Grans Absolute 0.0 0.0 - 0.1 x10E3/uL   Comprehensive Metabolic Panel    Collection Time: 08/06/20  9:07 AM   Result Value Ref Range    Glucose 101 (H) 65 - 99 mg/dL    BUN 12 8 - 27 mg/dL    Creatinine 1.12 0.76 - 1.27 mg/dL    eGFR Non African Am 66 >59 mL/min/1.73    eGFR African Am 77 >59 mL/min/1.73    BUN/Creatinine Ratio 11 10 - 24     Sodium 140 134 - 144 mmol/L    Potassium 4.4 3.5 - 5.2 mmol/L    Chloride 100 96 - 106 mmol/L    Total CO2 28 20 - 29 mmol/L    Calcium 9.2 8.6 - 10.2 mg/dL    Total Protein 6.5 6.0 - 8.5 g/dL    Albumin 3.6 (L) 3.8 - 4.8 g/dL    Globulin 2.9 1.5 - 4.5 g/dL    A/G Ratio 1.2 1.2 - 2.2    Total Bilirubin 0.7 0.0 - 1.2 mg/dL    Alkaline Phosphatase 61 39 - 117 IU/L    AST (SGOT) 34 0 - 40 IU/L    ALT (SGPT) 43 0 - 44 IU/L   Lipid Panel With / Chol / HDL Ratio    Collection Time: 08/06/20  9:07 AM   Result Value Ref Range    Total Cholesterol 131 100 - 199 mg/dL    Triglycerides 85 0 - 149 mg/dL    HDL Cholesterol 50 >39 mg/dL    VLDL Cholesterol 17 5 - 40 mg/dL    LDL Cholesterol  64 0 - 99 mg/dL    Chol/HDL Ratio 2.6 0.0 - 5.0 ratio   TSH    Collection Time: 08/06/20  9:07 AM   Result Value Ref Range    TSH 1.600 0.450 - 4.500 uIU/mL   Vitamin D 25 Hydroxy    Collection Time: 08/06/20  9:07 AM   Result Value Ref Range    25 Hydroxy, Vitamin D 59.4 30.0 - 100.0 ng/mL   Vitamin B12    Collection Time: 08/06/20  9:07 AM   Result Value Ref Range    Vitamin B-12 589 232 - 1,245 pg/mL   Folate    Collection Time: 08/06/20  9:07 AM   Result Value Ref Range    Folate 19.4 >3.0 ng/mL   Hemoglobin A1c    Collection Time: 08/06/20  9:07 AM   Result Value Ref Range    Hemoglobin A1C 5.9 (H) 4.8 - 5.6 %   PSA Screen    Collection Time: 08/06/20  9:07 AM   Result Value Ref Range    PSA 1.9 0.0 - 4.0 ng/mL     Assessment and Plan:  Patient Self-Management and Personalized Health Advice  The patient has been provided with information about: diet, exercise, weight management and designing advance directives and preventive services including:   · Annual Wellness Visit (AWV)  · Diabetes Self-Management Training (DSMT)   · Ultrasound Screening for Abdominal Aortic Aneurysm (AAA).    Advance Care Planning:  ACP discussion was held with the patient during this visit. Patient has an advance directive (not in EMR), copy  requested.  Identification of Risk Factors:  Risk factors include: Abdominal Aortic Aneurysm Screening  Advance Directive Discussion  Obesity/Overweight .    Diagnoses and all orders for this visit:    1. Medicare annual wellness visit, subsequent (Primary)  Assessment & Plan:  Medicare wellness completed on 8/19/2020.  Discussed advanced care directives.  In the process of finding patient's old records to determine if he needs a AAA screening and colonoscopy.      2. Immunotherapy    3. Seasonal allergic rhinitis due to pollen  -     Allergy Serum Injection  -     Allergy Serum Injection        Follow Up:  Return in about 1 year (around 8/19/2021) for Medicare Wellness.     An After Visit Summary and PPPS with all of these plans were given to the patient.

## 2020-08-26 ENCOUNTER — CLINICAL SUPPORT (OUTPATIENT)
Dept: FAMILY MEDICINE CLINIC | Facility: CLINIC | Age: 70
End: 2020-08-26

## 2020-08-26 DIAGNOSIS — J30.1 SEASONAL ALLERGIC RHINITIS DUE TO POLLEN: Primary | ICD-10-CM

## 2020-08-26 PROCEDURE — 95117 IMMUNOTHERAPY INJECTIONS: CPT | Performed by: FAMILY MEDICINE

## 2020-09-18 ENCOUNTER — CLINICAL SUPPORT (OUTPATIENT)
Dept: FAMILY MEDICINE CLINIC | Facility: CLINIC | Age: 70
End: 2020-09-18

## 2020-09-18 DIAGNOSIS — J30.1 SEASONAL ALLERGIC RHINITIS DUE TO POLLEN: Primary | ICD-10-CM

## 2020-09-18 PROCEDURE — 95117 IMMUNOTHERAPY INJECTIONS: CPT | Performed by: FAMILY MEDICINE

## 2020-09-21 RX ORDER — ATORVASTATIN CALCIUM 80 MG/1
TABLET, FILM COATED ORAL
Qty: 86 TABLET | Refills: 1 | Status: SHIPPED | OUTPATIENT
Start: 2020-09-21 | End: 2021-03-22

## 2020-10-01 ENCOUNTER — CLINICAL SUPPORT (OUTPATIENT)
Dept: FAMILY MEDICINE CLINIC | Facility: CLINIC | Age: 70
End: 2020-10-01

## 2020-10-01 DIAGNOSIS — J30.1 SEASONAL ALLERGIC RHINITIS DUE TO POLLEN: Primary | ICD-10-CM

## 2020-10-01 PROCEDURE — 95117 IMMUNOTHERAPY INJECTIONS: CPT | Performed by: FAMILY MEDICINE

## 2020-10-07 ENCOUNTER — CLINICAL SUPPORT (OUTPATIENT)
Dept: FAMILY MEDICINE CLINIC | Facility: CLINIC | Age: 70
End: 2020-10-07

## 2020-10-07 DIAGNOSIS — J30.1 SEASONAL ALLERGIC RHINITIS DUE TO POLLEN: Primary | ICD-10-CM

## 2020-10-07 PROCEDURE — 95117 IMMUNOTHERAPY INJECTIONS: CPT | Performed by: FAMILY MEDICINE

## 2020-10-14 ENCOUNTER — OFFICE VISIT (OUTPATIENT)
Dept: FAMILY MEDICINE CLINIC | Facility: CLINIC | Age: 70
End: 2020-10-14

## 2020-10-14 ENCOUNTER — HOSPITAL ENCOUNTER (OUTPATIENT)
Dept: GENERAL RADIOLOGY | Facility: HOSPITAL | Age: 70
Discharge: HOME | End: 2020-10-14
Admitting: FAMILY MEDICINE

## 2020-10-14 VITALS
WEIGHT: 225.4 LBS | DIASTOLIC BLOOD PRESSURE: 80 MMHG | BODY MASS INDEX: 29.87 KG/M2 | TEMPERATURE: 97.7 F | HEART RATE: 60 BPM | RESPIRATION RATE: 18 BRPM | OXYGEN SATURATION: 97 % | SYSTOLIC BLOOD PRESSURE: 136 MMHG | HEIGHT: 73 IN

## 2020-10-14 DIAGNOSIS — R35.0 URINARY FREQUENCY: ICD-10-CM

## 2020-10-14 DIAGNOSIS — M54.50 ACUTE LEFT-SIDED LOW BACK PAIN, UNSPECIFIED WHETHER SCIATICA PRESENT: Primary | ICD-10-CM

## 2020-10-14 DIAGNOSIS — S39.012A LUMBOSACRAL STRAIN, INITIAL ENCOUNTER: ICD-10-CM

## 2020-10-14 DIAGNOSIS — Z79.4 TYPE 2 DIABETES MELLITUS WITHOUT COMPLICATION, WITH LONG-TERM CURRENT USE OF INSULIN (HCC): ICD-10-CM

## 2020-10-14 DIAGNOSIS — M54.50 ACUTE LEFT-SIDED LOW BACK PAIN, UNSPECIFIED WHETHER SCIATICA PRESENT: ICD-10-CM

## 2020-10-14 DIAGNOSIS — E11.9 TYPE 2 DIABETES MELLITUS WITHOUT COMPLICATION, WITH LONG-TERM CURRENT USE OF INSULIN (HCC): ICD-10-CM

## 2020-10-14 DIAGNOSIS — I10 ESSENTIAL HYPERTENSION: ICD-10-CM

## 2020-10-14 DIAGNOSIS — Z85.51 HISTORY OF BLADDER CANCER: ICD-10-CM

## 2020-10-14 DIAGNOSIS — M51.9 LUMBAR DISC DISEASE: ICD-10-CM

## 2020-10-14 PROBLEM — E66.3 OVERWEIGHT (BMI 25.0-29.9): Status: ACTIVE | Noted: 2020-10-14

## 2020-10-14 LAB
BILIRUB BLD-MCNC: NEGATIVE MG/DL
CLARITY, POC: CLEAR
COLOR UR: YELLOW
GLUCOSE BLDC GLUCOMTR-MCNC: 108 MG/DL (ref 70–130)
GLUCOSE UR STRIP-MCNC: NEGATIVE MG/DL
KETONES UR QL: ABNORMAL
LEUKOCYTE EST, POC: NEGATIVE
NITRITE UR-MCNC: NEGATIVE MG/ML
PH UR: 5 [PH] (ref 5–8)
PROT UR STRIP-MCNC: ABNORMAL MG/DL
RBC # UR STRIP: ABNORMAL /UL
SP GR UR: 1.01 (ref 1–1.03)
UROBILINOGEN UR QL: NORMAL

## 2020-10-14 PROCEDURE — 82962 GLUCOSE BLOOD TEST: CPT | Performed by: FAMILY MEDICINE

## 2020-10-14 PROCEDURE — 99214 OFFICE O/P EST MOD 30 MIN: CPT | Performed by: FAMILY MEDICINE

## 2020-10-14 PROCEDURE — 81002 URINALYSIS NONAUTO W/O SCOPE: CPT | Performed by: FAMILY MEDICINE

## 2020-10-14 PROCEDURE — 72110 X-RAY EXAM L-2 SPINE 4/>VWS: CPT

## 2020-10-14 RX ORDER — MELOXICAM 15 MG/1
15 TABLET ORAL DAILY
Qty: 30 TABLET | Refills: 12 | Status: SHIPPED | OUTPATIENT
Start: 2020-10-14 | End: 2020-10-20

## 2020-10-14 RX ORDER — LANCETS 33 GAUGE
1 EACH MISCELLANEOUS DAILY
Qty: 30 EACH | Refills: 12 | Status: SHIPPED | OUTPATIENT
Start: 2020-10-14

## 2020-10-14 RX ORDER — GLUCOSAMINE HCL/CHONDROITIN SU 500-400 MG
1 CAPSULE ORAL DAILY
Qty: 30 EACH | Refills: 12 | Status: SHIPPED | OUTPATIENT
Start: 2020-10-14 | End: 2022-11-29

## 2020-10-14 RX ORDER — BLOOD-GLUCOSE METER
1 EACH MISCELLANEOUS ONCE
Qty: 1 EACH | Refills: 0 | Status: SHIPPED | OUTPATIENT
Start: 2020-10-14 | End: 2020-10-14

## 2020-10-14 RX ORDER — CYCLOBENZAPRINE HCL 10 MG
10 TABLET ORAL 3 TIMES DAILY PRN
Qty: 40 TABLET | Refills: 0 | Status: SHIPPED | OUTPATIENT
Start: 2020-10-14 | End: 2020-10-20

## 2020-10-14 NOTE — PROGRESS NOTES
Subjective   Herson Ruby is a 69 y.o. male.     Chief Complaint   Patient presents with   • Back Pain       Back Pain  This is a new problem. The current episode started in the past 7 days (10/08/2020). The problem occurs constantly. The problem is unchanged. Pain location: kidney area. The quality of the pain is described as burning. The pain does not radiate. The pain is at a severity of 6/10. The pain is moderate. The pain is the same all the time. The symptoms are aggravated by lying down, sitting and standing. Pertinent negatives include no abdominal pain, bladder incontinence, bowel incontinence, chest pain, dysuria, fever, headaches, numbness, weakness or weight loss. Risk factors include history of cancer (bladder cancer 2002). He has tried nothing for the symptoms. The treatment provided no relief.   Diabetes  He presents for his follow-up diabetic visit. He has type 2 diabetes mellitus. There are no hypoglycemic associated symptoms. Pertinent negatives for hypoglycemia include no headaches or sweats. Pertinent negatives for diabetes include no chest pain, no fatigue, no polydipsia, no polyuria, no weakness and no weight loss. There are no hypoglycemic complications. Risk factors for coronary artery disease include diabetes mellitus, dyslipidemia, hypertension, male sex and family history. Current diabetic treatment includes oral agent (monotherapy). When asked about meal planning, he reported none. He participates in exercise three times a week. (Herson does not check his blood sugars at home.) An ACE inhibitor/angiotensin II receptor blocker is not being taken. He does not see a podiatrist.[facility]   Hypertension  This is a chronic problem. The current episode started more than 1 year ago. The problem is controlled. Pertinent negatives include no chest pain, headaches, orthopnea, palpitations, peripheral edema, PND, shortness of breath or  sweats. Risk factors for coronary artery disease include diabetes mellitus, dyslipidemia, family history, male gender and obesity. Current antihypertension treatment includes calcium channel blockers and angiotensin blockers. The current treatment provides significant improvement.        The following portions of the patient's history were reviewed and updated as appropriate: allergies, current medications, past family history, past medical history, past social history, past surgical history and problem list.    Allergies:  Allergies   Allergen Reactions   • Valsartan GI Intolerance     Chest pain, dizzy,        Social History:  Social History     Socioeconomic History   • Marital status:      Spouse name: Not on file   • Number of children: Not on file   • Years of education: Not on file   • Highest education level: Not on file   Tobacco Use   • Smoking status: Former Smoker     Packs/day: 2.00     Years: 30.00     Pack years: 60.00     Types: Cigarettes     Start date:      Quit date:      Years since quittin.8   • Smokeless tobacco: Never Used   Substance and Sexual Activity   • Alcohol use: Yes     Frequency: Monthly or less     Drinks per session: 1 or 2     Binge frequency: Never   • Drug use: No   • Sexual activity: Defer       Family History:  Family History   Problem Relation Age of Onset   • Lung cancer Mother    • Diabetes Father    • Heart disease Father    • Lung cancer Father    • Alcohol abuse Brother    • Lung cancer Brother 57   • Heart disease Brother         MI at age 51 and  at 51   • Lung cancer Sister    • Cancer Sister    • Other Sister         MS  at 37   • Heart disease Sister    • Kidney disease Sister    • Liver disease Sister    • Lung cancer Sister        Past Medical History :  Patient Active Problem List   Diagnosis   • Mixed hyperlipidemia   • Malignant neoplasm of overlapping sites of bladder (CMS/HCC)   • Adenoma of liver   • History of ankle  surgery   • Type 2 diabetes mellitus without complication, with long-term current use of insulin (CMS/HCC)   • Chronic bronchitis (CMS/HCC)   • Actinic keratosis   • Allergic rhinitis   • Benign prostatic hyperplasia   • Knee pain   • Lymphadenopathy   • Essential hypertension   • Overweight with body mass index (BMI) of 29 to 29.9 in adult   • Fatigue   • Left lower quadrant abdominal pain   • Preventative health care   • Screening for colon cancer   • Benign prostatic hyperplasia with urinary obstruction   • Insulin resistance   • Acute headache   • Medicare annual wellness visit, subsequent   • Overweight (BMI 25.0-29.9)   • History of bladder cancer   • History of tobacco use   • Lumbar disc disease       Medication List:  Outpatient Encounter Medications as of 10/14/2020   Medication Sig Dispense Refill   • albuterol sulfate  (90 Base) MCG/ACT inhaler Inhale 2 puffs Every 4 (Four) Hours As Needed for Wheezing.     • atorvastatin (LIPITOR) 80 MG tablet TAKE 1 TABLET BY MOUTH EVERYDAY AT BEDTIME 86 tablet 1   • ipratropium (ATROVENT) 0.06 % nasal spray INSTILL TWO SPRAYS INTO EACH NOSTRIL TWICE DAILY     • losartan (COZAAR) 100 MG tablet Take 0.5 tablets by mouth Daily. 90 tablet 0   • Multiple Vitamins-Minerals (CENTRUM SILVER 50+MEN PO) Take  by mouth.     • Omega-3 Fatty Acids (FISH OIL) 1000 MG capsule capsule Take 2,000 mg by mouth Daily With Breakfast.     • Saw Palmetto-Phytosterols (PROSTATE SR PO) Take  by mouth.     • vitamin C (ASCORBIC ACID) 500 MG tablet Take 500 mg by mouth Daily.     • [DISCONTINUED] amLODIPine (NORVASC) 10 MG tablet Take 1 tablet by mouth Daily. For blood pressure 90 tablet 0   • [DISCONTINUED] metFORMIN (GLUCOPHAGE) 500 MG tablet TAKE 1 TABLET BY MOUTH TWICE A  tablet 0   • [] Blood Glucose Monitoring Suppl (ONE TOUCH ULTRA 2) w/Device kit 1 kit 1 (One) Time for 1 dose. 1 each 0   • Glucose Blood (Blood Glucose Test) strip 1 strip by In Vitro route Daily. 30  "each 12   • OneTouch Delica Lancets 33G misc 1 each Daily. 30 each 12   • [DISCONTINUED] cyclobenzaprine (FLEXERIL) 10 MG tablet Take 1 tablet by mouth 3 (Three) Times a Day As Needed for Muscle Spasms. 40 tablet 0   • [DISCONTINUED] meloxicam (MOBIC) 15 MG tablet Take 1 tablet by mouth Daily. For arthritis pain 30 tablet 12     Facility-Administered Encounter Medications as of 10/14/2020   Medication Dose Route Frequency Provider Last Rate Last Dose   • Allergy Serum Injection  0.35 mL Subcutaneous Once Chalino Ballard Sr., MD       • Allergy Serum Injection  0.35 mL Subcutaneous Once Chalino Ballard Sr., MD           Past Surgical History:  Past Surgical History:   Procedure Laterality Date   • ANKLE FUSION     • COLONOSCOPY      2016   • CYST REMOVAL     • HERNIA REPAIR      2016   • SHOULDER SURGERY         Review of Systems:  Review of Systems   Constitutional: Negative for fatigue, fever, unexpected weight gain and unexpected weight loss.   Respiratory: Negative for shortness of breath.    Cardiovascular: Negative for chest pain, palpitations, orthopnea and PND.   Gastrointestinal: Negative for abdominal pain, bowel incontinence, constipation, diarrhea, nausea and vomiting.   Endocrine: Negative for cold intolerance, heat intolerance, polydipsia and polyuria.   Genitourinary: Positive for frequency. Negative for dysuria and urinary incontinence.   Musculoskeletal: Positive for back pain. Negative for joint swelling.   Neurological: Negative for weakness and numbness.   Hematological: Negative for adenopathy. Does not bruise/bleed easily.       I have reviewed and confirmed the accuracy of the HPI and ROS as documented by the MA/LPN/RN Nunu John MD    Vital Signs:  Visit Vitals  /80 (BP Location: Right arm, Patient Position: Sitting, Cuff Size: Adult)   Pulse 60   Temp 97.7 °F (36.5 °C) (Temporal)   Resp 18   Ht 185.4 cm (73\")   Wt 102 kg (225 lb 6.4 oz)   SpO2 97% Comment: room air   BMI 29.74 kg/m² "       Physical Exam  Vitals signs reviewed.   Constitutional:       General: He is not in acute distress.     Appearance: He is well-developed.   HENT:      Right Ear: Tympanic membrane and external ear normal.      Left Ear: Tympanic membrane and external ear normal.   Eyes:      Conjunctiva/sclera: Conjunctivae normal.   Neck:      Musculoskeletal: Neck supple.      Thyroid: No thyromegaly.      Vascular: No JVD.   Cardiovascular:      Rate and Rhythm: Normal rate and regular rhythm.      Pulses:           Dorsalis pedis pulses are 2+ on the right side and 2+ on the left side.      Heart sounds: Normal heart sounds. No murmur. No friction rub. No gallop.    Pulmonary:      Effort: Pulmonary effort is normal. No respiratory distress.      Breath sounds: Normal breath sounds. No wheezing or rales.   Abdominal:      General: Bowel sounds are normal. There is no distension.      Palpations: Abdomen is soft. There is no mass.      Tenderness: There is no abdominal tenderness.   Musculoskeletal:      Lumbar back: He exhibits decreased range of motion and tenderness. He exhibits no bony tenderness, no swelling and normal pulse.      Right lower leg: No edema.      Left lower leg: No edema.   Lymphadenopathy:      Cervical: No cervical adenopathy.   Skin:     General: Skin is warm.      Findings: No erythema or rash.   Neurological:      Mental Status: He is alert and oriented to person, place, and time.      Coordination: Coordination normal.      Deep Tendon Reflexes: Reflexes normal.         Assessment and Plan:  Problem List Items Addressed This Visit        Unprioritized    Type 2 diabetes mellitus without complication, with long-term current use of insulin (CMS/[facility])    Overview     A1c 5.9 08/2020  Controlled.          Relevant Medications    OneTouch Delica Lancets 33G misc    Glucose Blood (Blood Glucose Test) strip    Other Relevant Orders    POCT Glucose (Completed)    Essential hypertension    History of  bladder cancer    Lumbar disc disease    Overview     anterolisthesis L5-S1 on xray 10.14.2020  Anti inflammatories consider PT if no improvement.            Other Visit Diagnoses     Acute left-sided low back pain, unspecified whether sciatica present    -  Primary    Relevant Orders    POCT urinalysis dipstick, manual (Completed)    XR Spine Lumbar Complete 4+VW (Completed)    Lumbosacral strain, initial encounter        Ice heat ROM, ant inflammatories, and muscle relaxers and follow up if no improvment.     Urinary frequency        Negative u/a with microscopy    Relevant Orders    Urinalysis With Microscopic - Urine, Clean Catch (Completed)          An After Visit Summary and PPPS were given to the patient.       I wore protective equipment throughout this patient encounter to include mask and eye protection. Hand hygiene was performed before donning protective equipment and after removal when leaving the room.

## 2020-10-15 LAB
APPEARANCE UR: CLEAR
BACTERIA #/AREA URNS HPF: NORMAL /[HPF]
BILIRUB UR QL STRIP: NEGATIVE
COLOR UR: YELLOW
EPI CELLS #/AREA URNS HPF: NORMAL /HPF (ref 0–10)
GLUCOSE UR QL: NEGATIVE
HGB UR QL STRIP: NEGATIVE
KETONES UR QL STRIP: NEGATIVE
LEUKOCYTE ESTERASE UR QL STRIP: NEGATIVE
MICRO URNS: NORMAL
MICRO URNS: NORMAL
MUCOUS THREADS URNS QL MICRO: PRESENT
NITRITE UR QL STRIP: NEGATIVE
PH UR STRIP: 6 [PH] (ref 5–7.5)
PROT UR QL STRIP: NEGATIVE
RBC #/AREA URNS HPF: NORMAL /HPF (ref 0–2)
SP GR UR: 1.01 (ref 1–1.03)
UROBILINOGEN UR STRIP-MCNC: 0.2 MG/DL (ref 0.2–1)
WBC #/AREA URNS HPF: NORMAL /HPF (ref 0–5)

## 2020-10-20 ENCOUNTER — OFFICE VISIT (OUTPATIENT)
Dept: FAMILY MEDICINE CLINIC | Facility: CLINIC | Age: 70
End: 2020-10-20

## 2020-10-20 VITALS
WEIGHT: 221.8 LBS | HEART RATE: 60 BPM | BODY MASS INDEX: 29.4 KG/M2 | OXYGEN SATURATION: 98 % | TEMPERATURE: 98.2 F | HEIGHT: 73 IN | DIASTOLIC BLOOD PRESSURE: 70 MMHG | SYSTOLIC BLOOD PRESSURE: 130 MMHG | RESPIRATION RATE: 16 BRPM

## 2020-10-20 DIAGNOSIS — Z79.4 TYPE 2 DIABETES MELLITUS WITHOUT COMPLICATION, WITH LONG-TERM CURRENT USE OF INSULIN (HCC): Primary | ICD-10-CM

## 2020-10-20 DIAGNOSIS — I10 ESSENTIAL HYPERTENSION: ICD-10-CM

## 2020-10-20 DIAGNOSIS — E78.2 MIXED HYPERLIPIDEMIA: ICD-10-CM

## 2020-10-20 DIAGNOSIS — J30.1 SEASONAL ALLERGIC RHINITIS DUE TO POLLEN: ICD-10-CM

## 2020-10-20 DIAGNOSIS — E66.3 OVERWEIGHT WITH BODY MASS INDEX (BMI) OF 29 TO 29.9 IN ADULT: ICD-10-CM

## 2020-10-20 DIAGNOSIS — E11.9 TYPE 2 DIABETES MELLITUS WITHOUT COMPLICATION, WITH LONG-TERM CURRENT USE OF INSULIN (HCC): Primary | ICD-10-CM

## 2020-10-20 DIAGNOSIS — Z87.891 HISTORY OF TOBACCO USE: ICD-10-CM

## 2020-10-20 PROCEDURE — 95117 IMMUNOTHERAPY INJECTIONS: CPT | Performed by: FAMILY MEDICINE

## 2020-10-20 PROCEDURE — 99214 OFFICE O/P EST MOD 30 MIN: CPT | Performed by: FAMILY MEDICINE

## 2020-10-22 DIAGNOSIS — I10 ESSENTIAL HYPERTENSION: ICD-10-CM

## 2020-10-22 RX ORDER — AMLODIPINE BESYLATE 10 MG/1
10 TABLET ORAL DAILY
Qty: 90 TABLET | Refills: 0 | Status: SHIPPED | OUTPATIENT
Start: 2020-10-22 | End: 2021-02-05

## 2020-10-27 PROBLEM — M51.9 LUMBAR DISC DISEASE: Status: ACTIVE | Noted: 2020-10-27

## 2020-10-28 ENCOUNTER — CLINICAL SUPPORT (OUTPATIENT)
Dept: FAMILY MEDICINE CLINIC | Facility: CLINIC | Age: 70
End: 2020-10-28

## 2020-10-28 DIAGNOSIS — J30.1 SEASONAL ALLERGIC RHINITIS DUE TO POLLEN: Primary | ICD-10-CM

## 2020-10-28 PROCEDURE — 95117 IMMUNOTHERAPY INJECTIONS: CPT | Performed by: FAMILY MEDICINE

## 2020-11-01 PROBLEM — M25.569 KNEE PAIN: Status: RESOLVED | Noted: 2020-03-23 | Resolved: 2020-11-01

## 2020-11-01 PROBLEM — R10.32 LEFT LOWER QUADRANT ABDOMINAL PAIN: Status: RESOLVED | Noted: 2020-06-11 | Resolved: 2020-11-01

## 2020-11-04 ENCOUNTER — CLINICAL SUPPORT (OUTPATIENT)
Dept: FAMILY MEDICINE CLINIC | Facility: CLINIC | Age: 70
End: 2020-11-04

## 2020-11-04 DIAGNOSIS — J30.1 SEASONAL ALLERGIC RHINITIS DUE TO POLLEN: Primary | ICD-10-CM

## 2020-11-04 PROCEDURE — 95117 IMMUNOTHERAPY INJECTIONS: CPT | Performed by: FAMILY MEDICINE

## 2020-11-11 ENCOUNTER — CLINICAL SUPPORT (OUTPATIENT)
Dept: FAMILY MEDICINE CLINIC | Facility: CLINIC | Age: 70
End: 2020-11-11

## 2020-11-11 DIAGNOSIS — J30.1 SEASONAL ALLERGIC RHINITIS DUE TO POLLEN: Primary | ICD-10-CM

## 2020-11-11 PROCEDURE — 95117 IMMUNOTHERAPY INJECTIONS: CPT | Performed by: FAMILY MEDICINE

## 2020-11-18 ENCOUNTER — OFFICE VISIT (OUTPATIENT)
Dept: FAMILY MEDICINE CLINIC | Facility: CLINIC | Age: 70
End: 2020-11-18

## 2020-11-18 VITALS
OXYGEN SATURATION: 96 % | HEIGHT: 73 IN | RESPIRATION RATE: 18 BRPM | SYSTOLIC BLOOD PRESSURE: 122 MMHG | WEIGHT: 214.6 LBS | BODY MASS INDEX: 28.44 KG/M2 | TEMPERATURE: 98 F | DIASTOLIC BLOOD PRESSURE: 62 MMHG | HEART RATE: 77 BPM

## 2020-11-18 DIAGNOSIS — Z79.4 TYPE 2 DIABETES MELLITUS WITHOUT COMPLICATION, WITH LONG-TERM CURRENT USE OF INSULIN (HCC): Primary | ICD-10-CM

## 2020-11-18 DIAGNOSIS — E11.9 TYPE 2 DIABETES MELLITUS WITHOUT COMPLICATION, WITH LONG-TERM CURRENT USE OF INSULIN (HCC): Primary | ICD-10-CM

## 2020-11-18 DIAGNOSIS — J30.1 SEASONAL ALLERGIC RHINITIS DUE TO POLLEN: ICD-10-CM

## 2020-11-18 DIAGNOSIS — I10 ESSENTIAL HYPERTENSION: ICD-10-CM

## 2020-11-18 DIAGNOSIS — E78.2 MIXED HYPERLIPIDEMIA: ICD-10-CM

## 2020-11-18 DIAGNOSIS — E66.3 OVERWEIGHT WITH BODY MASS INDEX (BMI) OF 29 TO 29.9 IN ADULT: ICD-10-CM

## 2020-11-18 DIAGNOSIS — Z87.891 HISTORY OF TOBACCO USE: ICD-10-CM

## 2020-11-18 PROBLEM — R53.83 FATIGUE: Status: RESOLVED | Noted: 2019-09-13 | Resolved: 2020-11-18

## 2020-11-18 PROBLEM — C67.8 MALIGNANT NEOPLASM OF OVERLAPPING SITES OF BLADDER: Status: RESOLVED | Noted: 2019-10-02 | Resolved: 2020-11-18

## 2020-11-18 PROBLEM — Z85.51 HISTORY OF BLADDER CANCER: Status: ACTIVE | Noted: 2020-11-18

## 2020-11-18 PROBLEM — J42 CHRONIC BRONCHITIS (HCC): Status: RESOLVED | Noted: 2019-12-27 | Resolved: 2020-11-18

## 2020-11-18 LAB
BILIRUB BLD-MCNC: NEGATIVE MG/DL
CLARITY, POC: CLEAR
COLOR UR: YELLOW
GLUCOSE UR STRIP-MCNC: NEGATIVE MG/DL
HBA1C MFR BLD: 6.1 %
KETONES UR QL: NEGATIVE
LEUKOCYTE EST, POC: NEGATIVE
NITRITE UR-MCNC: NEGATIVE MG/ML
PH UR: 6 [PH] (ref 5–8)
PROT UR STRIP-MCNC: NEGATIVE MG/DL
RBC # UR STRIP: NEGATIVE /UL
SP GR UR: 1.01 (ref 1–1.03)
UROBILINOGEN UR QL: NORMAL

## 2020-11-18 PROCEDURE — 83036 HEMOGLOBIN GLYCOSYLATED A1C: CPT | Performed by: FAMILY MEDICINE

## 2020-11-18 PROCEDURE — 81002 URINALYSIS NONAUTO W/O SCOPE: CPT | Performed by: FAMILY MEDICINE

## 2020-11-18 PROCEDURE — 99214 OFFICE O/P EST MOD 30 MIN: CPT | Performed by: FAMILY MEDICINE

## 2020-11-18 PROCEDURE — 95117 IMMUNOTHERAPY INJECTIONS: CPT | Performed by: FAMILY MEDICINE

## 2020-11-18 NOTE — PROGRESS NOTES
Subjective   Herson Ruby is a 70 y.o. male.     Chief Complaint   Patient presents with   • Diabetes   • Hypertension   • Hyperlipidemia       Diabetes  He presents for his follow-up diabetic visit. He has type 2 diabetes mellitus. There are no hypoglycemic associated symptoms. Pertinent negatives for hypoglycemia include no headaches or sweats. Pertinent negatives for diabetes include no chest pain, no fatigue, no polydipsia, no polyphagia and no weakness. There are no hypoglycemic complications. Risk factors for coronary artery disease include male sex, dyslipidemia, diabetes mellitus, hypertension and family history. Current diabetic treatment includes oral agent (monotherapy). Meal planning includes avoidance of concentrated sweets. He participates in exercise daily. His overall blood glucose range is 110-130 mg/dl. (Home blood sugar was 103 ng/mL) An ACE inhibitor/angiotensin II receptor blocker is not being taken. He does not see a podiatrist.Eye exam is current (08/10/2020).   Hypertension  This is a chronic problem. The current episode started more than 1 year ago. The problem is controlled. Pertinent negatives include no chest pain, headaches, orthopnea, palpitations, peripheral edema, PND, shortness of breath or sweats. Risk factors for coronary artery disease include family history, dyslipidemia, diabetes mellitus and male gender. Current antihypertension treatment includes beta blockers and calcium channel blockers. The current treatment provides significant improvement.   Hyperlipidemia  This is a chronic problem. The current episode started more than 1 year ago. The problem is controlled. Recent lipid tests were reviewed and are normal. Exacerbating diseases include diabetes. He has no history of obesity. Pertinent negatives include no chest pain or shortness of breath. Current antihyperlipidemic treatment includes statins and herbal therapy. The current treatment provides significant improvement of  lipids. Risk factors for coronary artery disease include male sex, hypertension, family history, dyslipidemia and diabetes mellitus.        The following portions of the patient's history were reviewed and updated as appropriate: allergies, current medications, past family history, past medical history, past social history, past surgical history and problem list.    Allergies:  Allergies   Allergen Reactions   • Valsartan GI Intolerance     Chest pain, dizzy,        Social History:  Social History     Socioeconomic History   • Marital status:      Spouse name: Not on file   • Number of children: Not on file   • Years of education: Not on file   • Highest education level: Not on file   Tobacco Use   • Smoking status: Former Smoker     Packs/day: 2.00     Years: 30.00     Pack years: 60.00     Types: Cigarettes     Start date:      Quit date:      Years since quittin.9   • Smokeless tobacco: Never Used   Substance and Sexual Activity   • Alcohol use: Yes     Frequency: Monthly or less     Drinks per session: 1 or 2     Binge frequency: Never   • Drug use: No   • Sexual activity: Defer       Family History:  Family History   Problem Relation Age of Onset   • Lung cancer Mother    • Diabetes Father    • Heart disease Father    • Lung cancer Father    • Alcohol abuse Brother    • Lung cancer Brother 57   • Heart disease Brother         MI at age 51 and  at 51   • Lung cancer Sister    • Cancer Sister    • Heart disease Sister    • Multiple sclerosis Sister          at 37   • Kidney disease Sister    • Liver disease Sister    • Lung cancer Sister        Past Medical History :  Patient Active Problem List   Diagnosis   • Mixed hyperlipidemia   • Adenoma of liver   • History of ankle surgery   • Type 2 diabetes mellitus without complication, with long-term current use of insulin (CMS/HCC)   • Actinic keratosis   • Allergic rhinitis   • Lymphadenopathy   • Essential hypertension   •  Overweight with body mass index (BMI) of 29 to 29.9 in adult   • Preventative health care   • Screening for colon cancer   • Benign prostatic hyperplasia with urinary obstruction   • Insulin resistance   • Medicare annual wellness visit, subsequent   • History of bladder cancer   • History of tobacco use   • Lumbar disc disease   • History of bladder cancer       Medication List:  Outpatient Encounter Medications as of 11/18/2020   Medication Sig Dispense Refill   • albuterol sulfate  (90 Base) MCG/ACT inhaler Inhale 2 puffs Every 4 (Four) Hours As Needed for Wheezing.     • amLODIPine (NORVASC) 10 MG tablet TAKE 1 TABLET BY MOUTH DAILY FOR BLOOD PRESSURE 90 tablet 0   • atorvastatin (LIPITOR) 80 MG tablet TAKE 1 TABLET BY MOUTH EVERYDAY AT BEDTIME 86 tablet 1   • Glucose Blood (Blood Glucose Test) strip 1 strip by In Vitro route Daily. 30 each 12   • ipratropium (ATROVENT) 0.06 % nasal spray INSTILL TWO SPRAYS INTO EACH NOSTRIL TWICE DAILY     • losartan (COZAAR) 100 MG tablet Take 0.5 tablets by mouth Daily. 90 tablet 0   • metFORMIN (GLUCOPHAGE) 500 MG tablet TAKE 1 TABLET BY MOUTH TWICE A  tablet 0   • Multiple Vitamins-Minerals (CENTRUM SILVER 50+MEN PO) Take  by mouth.     • Omega-3 Fatty Acids (FISH OIL) 1000 MG capsule capsule Take 1,000 mg by mouth Daily With Breakfast.     • OneTouch Delica Lancets 33G misc 1 each Daily. 30 each 12   • Saw Palmetto-Phytosterols (PROSTATE SR PO) Take 1 tablet by mouth.     • vitamin C (ASCORBIC ACID) 500 MG tablet Take 500 mg by mouth Daily.       Facility-Administered Encounter Medications as of 11/18/2020   Medication Dose Route Frequency Provider Last Rate Last Admin   • Allergy Serum Injection  0.35 mL Subcutaneous Once Chalino Ballard Sr., MD       • Allergy Serum Injection  0.35 mL Subcutaneous Once Chalino Ballard Sr., MD       • [COMPLETED] Allergy Serum Injection  0.35 mL Subcutaneous Once Nunu John MD   0.35 mL at 11/18/20 1135   • [COMPLETED]  "Allergy Serum Injection  0.35 mL Subcutaneous Once Alvaro, Nunu FERGUSON MD   0.35 mL at 11/18/20 7824       Past Surgical History:  Past Surgical History:   Procedure Laterality Date   • ANKLE FUSION     • COLONOSCOPY      2016   • CYST REMOVAL     • HERNIA REPAIR      2016   • SHOULDER SURGERY         Review of Systems:  Review of Systems   Constitutional: Negative for fatigue.   Respiratory: Negative for shortness of breath.    Cardiovascular: Negative for chest pain, palpitations, orthopnea and PND.   Endocrine: Negative for polydipsia and polyphagia.   Neurological: Negative for weakness.       I have reviewed and confirmed the accuracy of the HPI and ROS as documented by the MA/LPN/RN Nunu John MD    Vital Signs:  Visit Vitals  /62 (BP Location: Right arm, Patient Position: Sitting, Cuff Size: Adult)   Pulse 77   Temp 98 °F (36.7 °C) (Temporal)   Resp 18   Ht 185.4 cm (73\")   Wt 97.3 kg (214 lb 9.6 oz)   SpO2 96% Comment: Room air   BMI 28.31 kg/m²       Physical Exam  Vitals signs reviewed.   Constitutional:       General: He is not in acute distress.     Appearance: He is well-developed.   Neck:      Musculoskeletal: Neck supple.      Thyroid: No thyromegaly.      Vascular: No JVD.   Cardiovascular:      Rate and Rhythm: Normal rate and regular rhythm.      Pulses:           Dorsalis pedis pulses are 2+ on the right side and 2+ on the left side.      Heart sounds: Normal heart sounds. No murmur. No friction rub. No gallop.       Comments: Negative More's sx  Pulmonary:      Effort: Pulmonary effort is normal. No respiratory distress.      Breath sounds: Normal breath sounds. No wheezing or rales.   Musculoskeletal:      Right lower leg: No edema.      Left lower leg: No edema.   Lymphadenopathy:      Cervical: No cervical adenopathy.   Skin:     General: Skin is warm.      Findings: No erythema or rash.   Neurological:      Mental Status: He is alert and oriented to person, place, and time.      " Coordination: Coordination normal.         Assessment and Plan:  Problem List Items Addressed This Visit        High    Type 2 diabetes mellitus without complication, with long-term current use of insulin (CMS/[facility]) - Primary    Overview     A1c 6.1, 11/18/2020  A1c 5.9,  08/2020  Controlled.          Current Assessment & Plan     Diabetes is improving with treatment.   Continue current treatment regimen.  Dietary recommendations for ADA diet.  Regular aerobic exercise.  Diabetes will be reassessed in 3 months.         Relevant Orders    POCT urinalysis dipstick, manual (Completed)    POC Glycosylated Hemoglobin (Hb A1C) (Completed)    Microalbumin / Creatinine Urine Ratio - Urine, Clean Catch (Completed)    Essential hypertension    Overview     Controlled.          Current Assessment & Plan     Hypertension is improving with treatment.  Continue current treatment regimen.  Dietary sodium restriction.  Weight loss.  Regular aerobic exercise.  Blood pressure will be reassessed in 3 months.            Medium    Mixed hyperlipidemia    Overview     Controlled with Fish oil  Chol/HDL 2.6 08/2020         Current Assessment & Plan     Lipid abnormalities are improving with treatment.  Pharmacotherapy as ordered.  Lipids will be reassessed in 3 months.            Low    Overweight with body mass index (BMI) of 29 to 29.9 in adult    Overview     Healthy diet and regular exercise discussed and  Encouraged...         History of tobacco use    Overview     2 ppd x 30 yrs  Low dose CT 11/2019 negative,  repeat ordered.   He continues to abstain.             Unprioritized    Allergic rhinitis    Overview     Controlled on ImmunoRx  Stable                An After Visit Summary and PPPS were given to the patient.       I wore protective equipment throughout this patient encounter to include mask and eye protection. Hand hygiene was performed before donning protective equipment and after removal when leaving the room.

## 2020-11-19 LAB
ALBUMIN/CREAT UR: 3 MG/G CREAT (ref 0–29)
CREAT UR-MCNC: 183.2 MG/DL
MICROALBUMIN UR-MCNC: 6 UG/ML

## 2020-11-20 DIAGNOSIS — D13.4 ADENOMA OF LIVER: Primary | ICD-10-CM

## 2020-11-25 ENCOUNTER — CLINICAL SUPPORT (OUTPATIENT)
Dept: FAMILY MEDICINE CLINIC | Facility: CLINIC | Age: 70
End: 2020-11-25

## 2020-11-25 DIAGNOSIS — J30.1 SEASONAL ALLERGIC RHINITIS DUE TO POLLEN: Primary | ICD-10-CM

## 2020-11-25 PROCEDURE — 95117 IMMUNOTHERAPY INJECTIONS: CPT | Performed by: FAMILY MEDICINE

## 2020-12-02 ENCOUNTER — CLINICAL SUPPORT (OUTPATIENT)
Dept: FAMILY MEDICINE CLINIC | Facility: CLINIC | Age: 70
End: 2020-12-02

## 2020-12-02 DIAGNOSIS — J30.1 SEASONAL ALLERGIC RHINITIS DUE TO POLLEN: Primary | ICD-10-CM

## 2020-12-02 PROCEDURE — 95117 IMMUNOTHERAPY INJECTIONS: CPT | Performed by: FAMILY MEDICINE

## 2020-12-09 ENCOUNTER — CLINICAL SUPPORT (OUTPATIENT)
Dept: FAMILY MEDICINE CLINIC | Facility: CLINIC | Age: 70
End: 2020-12-09

## 2020-12-09 DIAGNOSIS — J30.1 SEASONAL ALLERGIC RHINITIS DUE TO POLLEN: Primary | ICD-10-CM

## 2020-12-09 PROCEDURE — 95117 IMMUNOTHERAPY INJECTIONS: CPT | Performed by: FAMILY MEDICINE

## 2020-12-15 ENCOUNTER — CLINICAL SUPPORT (OUTPATIENT)
Dept: FAMILY MEDICINE CLINIC | Facility: CLINIC | Age: 70
End: 2020-12-15

## 2020-12-15 DIAGNOSIS — J30.1 ALLERGIC RHINITIS DUE TO POLLEN, UNSPECIFIED SEASONALITY: Primary | ICD-10-CM

## 2020-12-15 PROCEDURE — 95117 IMMUNOTHERAPY INJECTIONS: CPT | Performed by: FAMILY MEDICINE

## 2020-12-23 ENCOUNTER — CLINICAL SUPPORT (OUTPATIENT)
Dept: FAMILY MEDICINE CLINIC | Facility: CLINIC | Age: 70
End: 2020-12-23

## 2020-12-23 DIAGNOSIS — J30.1 ALLERGIC RHINITIS DUE TO POLLEN, UNSPECIFIED SEASONALITY: Primary | ICD-10-CM

## 2020-12-23 PROCEDURE — 95117 IMMUNOTHERAPY INJECTIONS: CPT | Performed by: FAMILY MEDICINE

## 2020-12-30 ENCOUNTER — CLINICAL SUPPORT (OUTPATIENT)
Dept: FAMILY MEDICINE CLINIC | Facility: CLINIC | Age: 70
End: 2020-12-30

## 2020-12-30 DIAGNOSIS — J30.1 ALLERGIC RHINITIS DUE TO POLLEN, UNSPECIFIED SEASONALITY: Primary | ICD-10-CM

## 2020-12-30 PROCEDURE — 95117 IMMUNOTHERAPY INJECTIONS: CPT | Performed by: FAMILY MEDICINE

## 2021-01-06 ENCOUNTER — CLINICAL SUPPORT (OUTPATIENT)
Dept: FAMILY MEDICINE CLINIC | Facility: CLINIC | Age: 71
End: 2021-01-06

## 2021-01-06 DIAGNOSIS — J30.1 ALLERGIC RHINITIS DUE TO POLLEN, UNSPECIFIED SEASONALITY: Primary | ICD-10-CM

## 2021-01-06 PROCEDURE — 95117 IMMUNOTHERAPY INJECTIONS: CPT | Performed by: FAMILY MEDICINE

## 2021-01-13 ENCOUNTER — CLINICAL SUPPORT (OUTPATIENT)
Dept: FAMILY MEDICINE CLINIC | Facility: CLINIC | Age: 71
End: 2021-01-13

## 2021-01-13 DIAGNOSIS — J30.1 ALLERGIC RHINITIS DUE TO POLLEN, UNSPECIFIED SEASONALITY: Primary | ICD-10-CM

## 2021-01-13 PROCEDURE — 95117 IMMUNOTHERAPY INJECTIONS: CPT | Performed by: FAMILY MEDICINE

## 2021-01-20 ENCOUNTER — CLINICAL SUPPORT (OUTPATIENT)
Dept: FAMILY MEDICINE CLINIC | Facility: CLINIC | Age: 71
End: 2021-01-20

## 2021-01-20 DIAGNOSIS — J30.1 ALLERGIC RHINITIS DUE TO POLLEN, UNSPECIFIED SEASONALITY: Primary | ICD-10-CM

## 2021-01-20 PROCEDURE — 95117 IMMUNOTHERAPY INJECTIONS: CPT | Performed by: FAMILY MEDICINE

## 2021-01-21 ENCOUNTER — TELEPHONE (OUTPATIENT)
Dept: FAMILY MEDICINE CLINIC | Facility: CLINIC | Age: 71
End: 2021-01-21

## 2021-02-03 ENCOUNTER — CLINICAL SUPPORT (OUTPATIENT)
Dept: FAMILY MEDICINE CLINIC | Facility: CLINIC | Age: 71
End: 2021-02-03

## 2021-02-03 DIAGNOSIS — J30.1 SEASONAL ALLERGIC RHINITIS DUE TO POLLEN: Primary | ICD-10-CM

## 2021-02-03 PROCEDURE — 95117 IMMUNOTHERAPY INJECTIONS: CPT | Performed by: FAMILY MEDICINE

## 2021-02-05 DIAGNOSIS — I10 ESSENTIAL HYPERTENSION: ICD-10-CM

## 2021-02-05 RX ORDER — AMLODIPINE BESYLATE 10 MG/1
TABLET ORAL
Qty: 90 TABLET | Refills: 0 | Status: SHIPPED | OUTPATIENT
Start: 2021-02-05 | End: 2021-04-08

## 2021-02-10 DIAGNOSIS — I10 ESSENTIAL HYPERTENSION: ICD-10-CM

## 2021-02-11 RX ORDER — LOSARTAN POTASSIUM 100 MG/1
TABLET ORAL
Qty: 45 TABLET | Refills: 1 | Status: SHIPPED | OUTPATIENT
Start: 2021-02-11 | End: 2021-05-10 | Stop reason: SDUPTHER

## 2021-02-24 ENCOUNTER — OFFICE VISIT (OUTPATIENT)
Dept: FAMILY MEDICINE CLINIC | Facility: CLINIC | Age: 71
End: 2021-02-24

## 2021-02-24 VITALS
WEIGHT: 218 LBS | HEIGHT: 73 IN | TEMPERATURE: 98.3 F | RESPIRATION RATE: 16 BRPM | HEART RATE: 61 BPM | SYSTOLIC BLOOD PRESSURE: 116 MMHG | BODY MASS INDEX: 28.89 KG/M2 | DIASTOLIC BLOOD PRESSURE: 70 MMHG | OXYGEN SATURATION: 98 %

## 2021-02-24 DIAGNOSIS — J30.1 SEASONAL ALLERGIC RHINITIS DUE TO POLLEN: ICD-10-CM

## 2021-02-24 DIAGNOSIS — E66.3 OVERWEIGHT WITH BODY MASS INDEX (BMI) OF 29 TO 29.9 IN ADULT: ICD-10-CM

## 2021-02-24 DIAGNOSIS — Z79.4 TYPE 2 DIABETES MELLITUS WITHOUT COMPLICATION, WITH LONG-TERM CURRENT USE OF INSULIN (HCC): Primary | ICD-10-CM

## 2021-02-24 DIAGNOSIS — Z87.891 HISTORY OF TOBACCO USE: ICD-10-CM

## 2021-02-24 DIAGNOSIS — E11.9 TYPE 2 DIABETES MELLITUS WITHOUT COMPLICATION, WITH LONG-TERM CURRENT USE OF INSULIN (HCC): Primary | ICD-10-CM

## 2021-02-24 DIAGNOSIS — I10 ESSENTIAL HYPERTENSION: ICD-10-CM

## 2021-02-24 DIAGNOSIS — E78.2 MIXED HYPERLIPIDEMIA: ICD-10-CM

## 2021-02-24 LAB
BILIRUB BLD-MCNC: NEGATIVE MG/DL
CLARITY, POC: CLEAR
COLOR UR: YELLOW
GLUCOSE UR STRIP-MCNC: NEGATIVE MG/DL
HBA1C MFR BLD: 5.7 %
KETONES UR QL: NEGATIVE
LEUKOCYTE EST, POC: NEGATIVE
NITRITE UR-MCNC: NEGATIVE MG/ML
PH UR: 5 [PH] (ref 5–8)
PROT UR STRIP-MCNC: NEGATIVE MG/DL
RBC # UR STRIP: NEGATIVE /UL
SP GR UR: 1.02 (ref 1–1.03)
UROBILINOGEN UR QL: NORMAL

## 2021-02-24 PROCEDURE — 83036 HEMOGLOBIN GLYCOSYLATED A1C: CPT | Performed by: FAMILY MEDICINE

## 2021-02-24 PROCEDURE — 81002 URINALYSIS NONAUTO W/O SCOPE: CPT | Performed by: FAMILY MEDICINE

## 2021-02-24 PROCEDURE — 36415 COLL VENOUS BLD VENIPUNCTURE: CPT | Performed by: FAMILY MEDICINE

## 2021-02-24 PROCEDURE — 99214 OFFICE O/P EST MOD 30 MIN: CPT | Performed by: FAMILY MEDICINE

## 2021-02-24 RX ORDER — TAMSULOSIN HYDROCHLORIDE 0.4 MG/1
1 CAPSULE ORAL DAILY
COMMUNITY
Start: 2021-01-31 | End: 2021-04-14

## 2021-02-24 NOTE — PROGRESS NOTES
Chief Complaint  Diabetes, Hypertension, and Hyperlipidemia    Subjective     CC  Problem List  Visit Diagnosis   Encounters  Notes  Medications  Labs  Result Review Imaging  Media    Herson Ruby presents to [facility] for   Ishan was seen by Dr. Dorado- Urology for a follow up on benign prostatic hyperplasia on 01/31/2021. He was started on tamsulosin with great relief.    Diabetes  He presents for his follow-up diabetic visit. He has type 2 diabetes mellitus. His disease course has been improving. Pertinent negatives for hypoglycemia include no dizziness, headaches or sweats. Pertinent negatives for diabetes include no chest pain, no fatigue, no polydipsia, no polyuria and no weakness. There are no hypoglycemic complications. There are no diabetic complications. Risk factors for coronary artery disease include diabetes mellitus, dyslipidemia, hypertension, male sex, obesity and family history. He is following a diabetic diet. Meal planning includes avoidance of concentrated sweets. He participates in exercise every other day. His breakfast blood glucose range is generally  mg/dl. (Home blood sugar was 118 ng/mL) An ACE inhibitor/angiotensin II receptor blocker is not being taken. He does not see a podiatrist.[facility] exam is current [facility]   Hypertension  This is a chronic problem. The current episode started more than 1 year ago. The problem is controlled. Pertinent negatives include no chest pain, headaches, orthopnea, palpitations, peripheral edema, PND, shortness of breath or sweats. Risk factors for coronary artery disease include diabetes mellitus, dyslipidemia, family history, obesity and male gender. Current antihypertension treatment includes angiotensin blockers and calcium channel blockers. The current treatment provides significant improvement.   Hyperlipidemia  This is a chronic problem. The current episode started more than 1  "year ago. The problem is controlled. Recent lipid tests were reviewed and are normal. Exacerbating diseases include diabetes and obesity. Pertinent negatives include no chest pain or shortness of breath. Current antihyperlipidemic treatment includes statins and herbal therapy. The current treatment provides significant improvement of lipids. Risk factors for coronary artery disease include diabetes mellitus, dyslipidemia, family history, hypertension, male sex and obesity.       Review of Systems   Constitutional: Negative for appetite change, fatigue and fever.   HENT: Negative for congestion, ear pain, sinus pressure, sore throat and tinnitus.    Eyes: Negative for visual disturbance.   Respiratory: Negative for cough, shortness of breath and wheezing.    Cardiovascular: Negative for chest pain, palpitations, orthopnea, leg swelling and PND.   Gastrointestinal: Negative for abdominal pain, constipation, diarrhea, nausea and vomiting.   Endocrine: Negative.  Negative for cold intolerance, heat intolerance, polydipsia and polyuria.   Genitourinary: Negative for dysuria, frequency and hematuria.   Skin: Negative for rash and bruise.   Neurological: Negative for dizziness, syncope, weakness and headache.   Hematological: Negative for adenopathy. Does not bruise/bleed easily.   Psychiatric/Behavioral: Negative for depressed mood.        Objective   Vital Signs:   /70 (BP Location: Right arm, Patient Position: Sitting, Cuff Size: Large Adult)   Pulse 61   Temp 98.3 °F (36.8 °C) (Temporal)   Resp 16   Ht 185.4 cm (73\")   Wt 98.9 kg (218 lb)   SpO2 98% Comment: Room air  BMI 28.76 kg/m²     Physical Exam  Constitutional:       General: He is not in acute distress.     Appearance: He is well-developed.   Eyes:      Conjunctiva/sclera: Conjunctivae normal.   Neck:      Musculoskeletal: Neck supple.      Thyroid: No thyromegaly.      Vascular: No JVD.   Cardiovascular:      Rate and Rhythm: Normal rate and " regular rhythm.      Pulses:           Dorsalis pedis pulses are 2+ on the right side and 2+ on the left side.      Heart sounds: Normal heart sounds. No murmur. No friction rub. No gallop.       Comments: Negative More's  Pulmonary:      Effort: Pulmonary effort is normal. No respiratory distress.      Breath sounds: Normal breath sounds. No wheezing or rales.   Musculoskeletal:      Right lower leg: No edema.      Left lower leg: No edema.   Lymphadenopathy:      Cervical: No cervical adenopathy.   Skin:     Findings: No rash.   Neurological:      Mental Status: He is alert and oriented to person, place, and time.      Coordination: Coordination normal.        Result Review :Labs  Result Review  Imaging  Med Tab  Media                 Assessment and Plan CC Problem List  Visit Diagnosis  ROS  Review (Popup)  Health Maintenance  Quality  BestPractice  Medications  SmartSets  SnapShot Encounters  Media  Problem List Items Addressed This Visit        High    Type 2 diabetes mellitus without complication, with long-term current use of insulin (CMS/[facility]) - Primary    Overview     A1c 5.8 02/24/2021 improved.   A1c 6.1, 11/18/2020  A1c 5.9,  08/2020  Controlled.          Current Assessment & Plan     Diabetes is improving with treatment.   Continue current treatment regimen.  Regular aerobic exercise.  Diabetes will be reassessed in 3 months.         Relevant Orders    POCT urinalysis dipstick, manual (Completed)    POC Glycosylated Hemoglobin (Hb A1C) (Completed)    Essential hypertension    Overview     Controlled.          Current Assessment & Plan     Hypertension is improving with treatment.  Continue current treatment regimen.  Dietary sodium restriction.  Weight loss.  Regular aerobic exercise.  Blood pressure will be reassessed in 3 months.         Relevant Orders    Comprehensive Metabolic Panel (Completed)    CBC & Differential (Completed)    TSH (Completed)       Medium    Mixed hyperlipidemia     Overview     Controlled with Fish oil  Chol/HDL 2.6 08/2020         Current Assessment & Plan     Lipid abnormalities are improving with treatment.  Pharmacotherapy as ordered.  Lipids will be reassessed in 3 months.         Relevant Orders    Lipid Panel With / Chol / HDL Ratio (Completed)       Low    Overweight with body mass index (BMI) of 29 to 29.9 in adult    Overview     Healthy diet and regular exercise discussed and  Encouraged.         History of tobacco use    Overview     2 ppd x 30 yrs  Low dose CT 11/2019 10/2020 negative,   He continues to abstain.             Unprioritized    Allergic rhinitis    Overview     Controlled on ImmunoRx  He will continue                Follow Up Instructions Charge Capture  Follow-up Communications  No follow-ups on file.  Patient was given instructions and counseling regarding his condition or for health maintenance advice. Please see specific information pulled into the AVS if appropriate.      Site care done- cleaned with alcohol swab, procedure tolerated well, dressing applied. Venipuncture was obtained after 1 time(s). 2 tubes were drawn. Needle gauge used was 22.

## 2021-02-25 LAB
ALBUMIN SERPL-MCNC: 3.6 G/DL (ref 3.7–4.7)
ALBUMIN/GLOB SERPL: 1.3 {RATIO} (ref 1.2–2.2)
ALP SERPL-CCNC: 62 IU/L (ref 39–117)
ALT SERPL-CCNC: 38 IU/L (ref 0–44)
AST SERPL-CCNC: 35 IU/L (ref 0–40)
BASOPHILS # BLD AUTO: 0.1 X10E3/UL (ref 0–0.2)
BASOPHILS NFR BLD AUTO: 1 %
BILIRUB SERPL-MCNC: 0.4 MG/DL (ref 0–1.2)
BUN SERPL-MCNC: 11 MG/DL (ref 8–27)
BUN/CREAT SERPL: 11 (ref 10–24)
CALCIUM SERPL-MCNC: 8.8 MG/DL (ref 8.6–10.2)
CHLORIDE SERPL-SCNC: 105 MMOL/L (ref 96–106)
CHOLEST SERPL-MCNC: 120 MG/DL (ref 100–199)
CHOLEST/HDLC SERPL: 2.3 RATIO (ref 0–5)
CO2 SERPL-SCNC: 25 MMOL/L (ref 20–29)
CREAT SERPL-MCNC: 1.01 MG/DL (ref 0.76–1.27)
EOSINOPHIL # BLD AUTO: 0.2 X10E3/UL (ref 0–0.4)
EOSINOPHIL NFR BLD AUTO: 3 %
ERYTHROCYTE [DISTWIDTH] IN BLOOD BY AUTOMATED COUNT: 12.4 % (ref 11.6–15.4)
GLOBULIN SER CALC-MCNC: 2.8 G/DL (ref 1.5–4.5)
GLUCOSE SERPL-MCNC: 112 MG/DL (ref 65–99)
HCT VFR BLD AUTO: 43.1 % (ref 37.5–51)
HDLC SERPL-MCNC: 53 MG/DL
HGB BLD-MCNC: 14.4 G/DL (ref 13–17.7)
IMM GRANULOCYTES # BLD AUTO: 0 X10E3/UL (ref 0–0.1)
IMM GRANULOCYTES NFR BLD AUTO: 0 %
LDLC SERPL CALC-MCNC: 51 MG/DL (ref 0–99)
LYMPHOCYTES # BLD AUTO: 1.2 X10E3/UL (ref 0.7–3.1)
LYMPHOCYTES NFR BLD AUTO: 21 %
MCH RBC QN AUTO: 29.9 PG (ref 26.6–33)
MCHC RBC AUTO-ENTMCNC: 33.4 G/DL (ref 31.5–35.7)
MCV RBC AUTO: 90 FL (ref 79–97)
MONOCYTES # BLD AUTO: 0.5 X10E3/UL (ref 0.1–0.9)
MONOCYTES NFR BLD AUTO: 9 %
NEUTROPHILS # BLD AUTO: 3.9 X10E3/UL (ref 1.4–7)
NEUTROPHILS NFR BLD AUTO: 66 %
PLATELET # BLD AUTO: 200 X10E3/UL (ref 150–450)
POTASSIUM SERPL-SCNC: 4.2 MMOL/L (ref 3.5–5.2)
PROT SERPL-MCNC: 6.4 G/DL (ref 6–8.5)
RBC # BLD AUTO: 4.81 X10E6/UL (ref 4.14–5.8)
SODIUM SERPL-SCNC: 143 MMOL/L (ref 134–144)
TRIGL SERPL-MCNC: 80 MG/DL (ref 0–149)
TSH SERPL DL<=0.005 MIU/L-ACNC: 1.52 UIU/ML (ref 0.45–4.5)
VLDLC SERPL CALC-MCNC: 16 MG/DL (ref 5–40)
WBC # BLD AUTO: 5.9 X10E3/UL (ref 3.4–10.8)

## 2021-03-03 ENCOUNTER — CLINICAL SUPPORT (OUTPATIENT)
Dept: FAMILY MEDICINE CLINIC | Facility: CLINIC | Age: 71
End: 2021-03-03

## 2021-03-03 DIAGNOSIS — J30.1 ALLERGIC RHINITIS DUE TO POLLEN, UNSPECIFIED SEASONALITY: Primary | ICD-10-CM

## 2021-03-03 PROCEDURE — 95117 IMMUNOTHERAPY INJECTIONS: CPT | Performed by: FAMILY MEDICINE

## 2021-03-10 ENCOUNTER — CLINICAL SUPPORT (OUTPATIENT)
Dept: FAMILY MEDICINE CLINIC | Facility: CLINIC | Age: 71
End: 2021-03-10

## 2021-03-10 DIAGNOSIS — J30.1 ALLERGIC RHINITIS DUE TO POLLEN, UNSPECIFIED SEASONALITY: Primary | ICD-10-CM

## 2021-03-10 PROCEDURE — 95117 IMMUNOTHERAPY INJECTIONS: CPT | Performed by: FAMILY MEDICINE

## 2021-03-17 ENCOUNTER — CLINICAL SUPPORT (OUTPATIENT)
Dept: FAMILY MEDICINE CLINIC | Facility: CLINIC | Age: 71
End: 2021-03-17

## 2021-03-17 DIAGNOSIS — J30.1 ALLERGIC RHINITIS DUE TO POLLEN, UNSPECIFIED SEASONALITY: Primary | ICD-10-CM

## 2021-03-17 PROCEDURE — 95117 IMMUNOTHERAPY INJECTIONS: CPT | Performed by: FAMILY MEDICINE

## 2021-03-22 RX ORDER — ATORVASTATIN CALCIUM 80 MG/1
TABLET, FILM COATED ORAL
Qty: 86 TABLET | Refills: 1 | Status: SHIPPED | OUTPATIENT
Start: 2021-03-22 | End: 2021-06-02

## 2021-03-24 ENCOUNTER — CLINICAL SUPPORT (OUTPATIENT)
Dept: FAMILY MEDICINE CLINIC | Facility: CLINIC | Age: 71
End: 2021-03-24

## 2021-03-24 DIAGNOSIS — J30.1 ALLERGIC RHINITIS DUE TO POLLEN, UNSPECIFIED SEASONALITY: Primary | ICD-10-CM

## 2021-03-24 PROCEDURE — 95117 IMMUNOTHERAPY INJECTIONS: CPT | Performed by: FAMILY MEDICINE

## 2021-03-25 ENCOUNTER — TELEPHONE (OUTPATIENT)
Dept: FAMILY MEDICINE CLINIC | Facility: CLINIC | Age: 71
End: 2021-03-25

## 2021-03-31 ENCOUNTER — CLINICAL SUPPORT (OUTPATIENT)
Dept: FAMILY MEDICINE CLINIC | Facility: CLINIC | Age: 71
End: 2021-03-31

## 2021-03-31 DIAGNOSIS — J30.1 ALLERGIC RHINITIS DUE TO POLLEN, UNSPECIFIED SEASONALITY: Primary | ICD-10-CM

## 2021-03-31 PROCEDURE — 95117 IMMUNOTHERAPY INJECTIONS: CPT | Performed by: FAMILY MEDICINE

## 2021-04-07 ENCOUNTER — CLINICAL SUPPORT (OUTPATIENT)
Dept: FAMILY MEDICINE CLINIC | Facility: CLINIC | Age: 71
End: 2021-04-07

## 2021-04-07 DIAGNOSIS — I10 ESSENTIAL HYPERTENSION: ICD-10-CM

## 2021-04-07 DIAGNOSIS — J30.1 NON-SEASONAL ALLERGIC RHINITIS DUE TO POLLEN: Primary | ICD-10-CM

## 2021-04-07 PROCEDURE — 95117 IMMUNOTHERAPY INJECTIONS: CPT | Performed by: FAMILY MEDICINE

## 2021-04-08 RX ORDER — AMLODIPINE BESYLATE 10 MG/1
TABLET ORAL
Qty: 90 TABLET | Refills: 3 | Status: SHIPPED | OUTPATIENT
Start: 2021-04-08 | End: 2022-04-11

## 2021-04-14 ENCOUNTER — HOSPITAL ENCOUNTER (OUTPATIENT)
Dept: GENERAL RADIOLOGY | Facility: HOSPITAL | Age: 71
Discharge: HOME | End: 2021-04-14
Admitting: FAMILY MEDICINE

## 2021-04-14 ENCOUNTER — OFFICE VISIT (OUTPATIENT)
Dept: FAMILY MEDICINE CLINIC | Facility: CLINIC | Age: 71
End: 2021-04-14

## 2021-04-14 VITALS
HEART RATE: 80 BPM | RESPIRATION RATE: 18 BRPM | DIASTOLIC BLOOD PRESSURE: 70 MMHG | OXYGEN SATURATION: 96 % | WEIGHT: 225.6 LBS | BODY MASS INDEX: 32.3 KG/M2 | HEIGHT: 70 IN | SYSTOLIC BLOOD PRESSURE: 110 MMHG | TEMPERATURE: 98 F

## 2021-04-14 DIAGNOSIS — I10 ESSENTIAL HYPERTENSION: ICD-10-CM

## 2021-04-14 DIAGNOSIS — M51.9 LUMBAR DISC DISEASE: ICD-10-CM

## 2021-04-14 DIAGNOSIS — E11.9 TYPE 2 DIABETES MELLITUS WITHOUT COMPLICATION, WITH LONG-TERM CURRENT USE OF INSULIN (HCC): ICD-10-CM

## 2021-04-14 DIAGNOSIS — Z87.891 HISTORY OF TOBACCO USE: ICD-10-CM

## 2021-04-14 DIAGNOSIS — G89.29 HIP PAIN, CHRONIC, LEFT: Primary | ICD-10-CM

## 2021-04-14 DIAGNOSIS — M25.552 HIP PAIN, CHRONIC, LEFT: Primary | ICD-10-CM

## 2021-04-14 DIAGNOSIS — Z79.4 TYPE 2 DIABETES MELLITUS WITHOUT COMPLICATION, WITH LONG-TERM CURRENT USE OF INSULIN (HCC): ICD-10-CM

## 2021-04-14 DIAGNOSIS — J30.1 ALLERGIC RHINITIS DUE TO POLLEN, UNSPECIFIED SEASONALITY: ICD-10-CM

## 2021-04-14 DIAGNOSIS — M79.631 PAIN OF RIGHT FOREARM: ICD-10-CM

## 2021-04-14 DIAGNOSIS — E66.09 CLASS 1 OBESITY DUE TO EXCESS CALORIES WITHOUT SERIOUS COMORBIDITY WITH BODY MASS INDEX (BMI) OF 32.0 TO 32.9 IN ADULT: ICD-10-CM

## 2021-04-14 PROBLEM — E88.819 INSULIN RESISTANCE: Status: RESOLVED | Noted: 2019-09-13 | Resolved: 2021-04-14

## 2021-04-14 PROBLEM — E88.81 INSULIN RESISTANCE: Status: RESOLVED | Noted: 2019-09-13 | Resolved: 2021-04-14

## 2021-04-14 PROCEDURE — 99214 OFFICE O/P EST MOD 30 MIN: CPT | Performed by: FAMILY MEDICINE

## 2021-04-14 PROCEDURE — 73502 X-RAY EXAM HIP UNI 2-3 VIEWS: CPT

## 2021-04-14 PROCEDURE — 95117 IMMUNOTHERAPY INJECTIONS: CPT | Performed by: FAMILY MEDICINE

## 2021-04-14 RX ORDER — MELOXICAM 15 MG/1
15 TABLET ORAL DAILY
Qty: 30 TABLET | Refills: 12 | Status: SHIPPED | OUTPATIENT
Start: 2021-04-14 | End: 2022-10-21

## 2021-04-14 RX ORDER — CYCLOBENZAPRINE HCL 10 MG
10 TABLET ORAL DAILY PRN
Qty: 30 TABLET | Refills: 2 | Status: SHIPPED | OUTPATIENT
Start: 2021-04-14 | End: 2021-09-20

## 2021-04-14 NOTE — ASSESSMENT & PLAN NOTE
Hypertension is improving with treatment.  Continue current treatment regimen.  Dietary sodium restriction.  Weight loss.  Regular aerobic exercise.  Blood pressure will be reassessed 1 ms.

## 2021-04-14 NOTE — PROGRESS NOTES
Chief Complaint  Hip Pain, Arm Pain, Diabetes, and Hypertension    Subjective     CC  Problem List  Visit Diagnosis   Encounters  Notes  Medications  Labs  Result Review Imaging  Media    Herson Ruby presents to [facility] for   Hip Pain   The incident occurred more than 1 week ago (years). There was no injury mechanism. The pain is present in the left hip. The quality of the pain is described as aching. The pain is at a severity of 5/10. The pain is moderate. The pain has been worsening since onset. Associated symptoms include an inability to bear weight. Pertinent negatives include no loss of motion, loss of sensation, muscle weakness, numbness or tingling. He reports no foreign bodies present. The symptoms are aggravated by movement and weight bearing. Treatments tried: Meloxicam. The treatment provided mild relief.   Arm Pain   The incident occurred more than 1 week ago (one month). The incident occurred at the gym. The injury mechanism was repetitive motion. The pain is present in the right forearm. The quality of the pain is described as aching. The pain is at a severity of 5/10. The pain is moderate. The pain has been worsening since the incident. Pertinent negatives include no chest pain, muscle weakness, numbness or tingling. The symptoms are aggravated by movement and lifting. He has tried ice and heat for the symptoms. The treatment provided mild relief.   Diabetes  He presents for his follow-up diabetic visit. He has type 2 diabetes mellitus. Pertinent negatives for hypoglycemia include no headaches or sweats. Pertinent negatives for diabetes include no chest pain, no polydipsia, no weakness and no weight loss. There are no hypoglycemic complications. There are no diabetic complications. Risk factors for coronary artery disease include diabetes mellitus, dyslipidemia, hypertension, male sex, obesity and family history. His overall blood glucose range is   "mg/dl. (Home blood sugar 105) An ACE inhibitor/angiotensin II receptor blocker is not being taken.   Hypertension  This is a chronic problem. The current episode started more than 1 year ago. The problem is controlled. Pertinent negatives include no chest pain, headaches, orthopnea, palpitations, peripheral edema, PND, shortness of breath or sweats. Risk factors for coronary artery disease include diabetes mellitus, dyslipidemia, family history, obesity and male gender. Current antihypertension treatment includes calcium channel blockers and angiotensin blockers. The current treatment provides moderate improvement.   Back Pain  This is a chronic problem. The current episode started 1 to 4 weeks ago. The problem occurs intermittently. The problem has been gradually worsening (he has know DJD L5-S1) since onset. Pertinent negatives include no abdominal pain, bladder incontinence, bowel incontinence, chest pain, fever, headaches, numbness, paresthesias, tingling, weakness or weight loss.       Review of Systems   Constitutional: Negative for fever, unexpected weight gain and unexpected weight loss.   Respiratory: Negative for shortness of breath and wheezing.    Cardiovascular: Negative for chest pain, palpitations, orthopnea, leg swelling and PND.   Gastrointestinal: Negative for abdominal pain, bowel incontinence, constipation, diarrhea, nausea and vomiting.   Endocrine: Negative for cold intolerance, heat intolerance and polydipsia.   Genitourinary: Negative for urinary incontinence.   Musculoskeletal: Positive for arthralgias and back pain.   Neurological: Negative for tingling, weakness, numbness and paresthesias.        Objective   Vital Signs:   /70 (BP Location: Right arm, Patient Position: Sitting, Cuff Size: Adult)   Pulse 80   Temp 98 °F (36.7 °C) (Temporal)   Resp 18   Ht 177.8 cm (70\")   Wt 102 kg (225 lb 9.6 oz)   SpO2 96% Comment: room air  BMI 32.37 kg/m²     Physical Exam  Constitutional:  "      General: He is not in acute distress.     Appearance: He is obese.   Neck:      Thyroid: No thyromegaly.      Vascular: No JVD.   Cardiovascular:      Rate and Rhythm: Normal rate and regular rhythm.      Heart sounds: No murmur heard.     Pulmonary:      Effort: Pulmonary effort is normal.      Breath sounds: Normal breath sounds. No wheezing.   Musculoskeletal:      Left upper arm: Tenderness (There is tenderness over the extensor tendons of the forearm. ) present. No swelling, edema or deformity.      Cervical back: Neck supple.      Lumbar back: No swelling or tenderness. Decreased range of motion. Negative right straight leg raise test and negative left straight leg raise test.      Left hip: No tenderness. Decreased range of motion (mild decrease in rotational motion. ). Normal strength.   Lymphadenopathy:      Cervical: No cervical adenopathy.   Skin:     Findings: No rash.   Neurological:      Mental Status: He is alert.        Result Review :Labs  Result Review  Imaging  Med Tab  Media                 Assessment and Plan CC Problem List  Visit Diagnosis  ROS  Review (Popup)  Health Maintenance  Quality  BestPractice  Medications  SmartSets  SnapShot Encounters  Media  Problem List Items Addressed This Visit        High    Type 2 diabetes mellitus without complication, with long-term current use of insulin (CMS/[facility])    Overview     A1c 5.8 02/24/2021 improved. HE reports that his sugars are 120-180  A1c 6.1, 11/18/2020  A1c 5.9,  08/2020  Controlled.          Essential hypertension    Overview     Controlled.          Current Assessment & Plan     Hypertension is improving with treatment.  Continue current treatment regimen.  Dietary sodium restriction.  Weight loss.  Regular aerobic exercise.  Blood pressure will be reassessed 1 ms.            Low    Allergic rhinitis    Overview     Controlled on ImmunoRx  He will continue.         Relevant Medications    Allergy Serum Injection  (Completed)    Allergy Serum Injection (Completed)    meloxicam (MOBIC) 15 MG tablet    Overweight with body mass index (BMI) of 29 to 29.9 in adult    Overview     Healthy diet and regular exercise discussed and  Encouraged f/u n 2 mos.          History of tobacco use    Overview     2 ppd x 30 yrs  Low dose CT 11/2019 10/2020 negative,   He continues to abstain.             Unprioritized    Lumbar disc disease    Overview     anterolisthesis L5-S1 with severe arthropathy, on xray 10.14.2020  Anti inflammatories consider PT if no improvement.          Relevant Medications    meloxicam (MOBIC) 15 MG tablet    cyclobenzaprine (FLEXERIL) 10 MG tablet      Other Visit Diagnoses     Hip pain, chronic, left    -  Primary    referred from back negative exam and negative xray    Relevant Orders    XR Hip With or Without Pelvis 2 - 3 View Left    Pain of right forearm        tendinitis, Ice heat ROM, anti iflammatories and follow up if no improvement          Follow Up Instructions Charge Capture  Follow-up Communications  Return in about 2 months (around 6/14/2021), or if symptoms worsen or fail to improve.  Patient was given instructions and counseling regarding his condition or for health maintenance advice. Please see specific information pulled into the AVS if appropriate.

## 2021-04-22 ENCOUNTER — CLINICAL SUPPORT (OUTPATIENT)
Dept: FAMILY MEDICINE CLINIC | Facility: CLINIC | Age: 71
End: 2021-04-22

## 2021-04-22 DIAGNOSIS — J30.1 ALLERGIC RHINITIS DUE TO POLLEN, UNSPECIFIED SEASONALITY: Primary | ICD-10-CM

## 2021-04-22 PROCEDURE — 95117 IMMUNOTHERAPY INJECTIONS: CPT | Performed by: FAMILY MEDICINE

## 2021-04-28 ENCOUNTER — CLINICAL SUPPORT (OUTPATIENT)
Dept: FAMILY MEDICINE CLINIC | Facility: CLINIC | Age: 71
End: 2021-04-28

## 2021-04-28 DIAGNOSIS — J30.1 ALLERGIC RHINITIS DUE TO POLLEN, UNSPECIFIED SEASONALITY: Primary | ICD-10-CM

## 2021-04-28 PROCEDURE — 95117 IMMUNOTHERAPY INJECTIONS: CPT | Performed by: FAMILY MEDICINE

## 2021-05-05 ENCOUNTER — CLINICAL SUPPORT (OUTPATIENT)
Dept: FAMILY MEDICINE CLINIC | Facility: CLINIC | Age: 71
End: 2021-05-05

## 2021-05-05 DIAGNOSIS — J30.1 ALLERGIC RHINITIS DUE TO POLLEN, UNSPECIFIED SEASONALITY: Primary | ICD-10-CM

## 2021-05-05 PROCEDURE — 95117 IMMUNOTHERAPY INJECTIONS: CPT | Performed by: FAMILY MEDICINE

## 2021-05-10 ENCOUNTER — TELEPHONE (OUTPATIENT)
Dept: FAMILY MEDICINE CLINIC | Facility: CLINIC | Age: 71
End: 2021-05-10

## 2021-05-10 DIAGNOSIS — I10 ESSENTIAL HYPERTENSION: ICD-10-CM

## 2021-05-10 RX ORDER — LOSARTAN POTASSIUM 50 MG/1
50 TABLET ORAL DAILY
Qty: 90 TABLET | Refills: 4 | Status: SHIPPED | OUTPATIENT
Start: 2021-05-10 | End: 2022-07-05

## 2021-05-10 NOTE — TELEPHONE ENCOUNTER
Patient called requesting a refill of Losartan 50mg one tab daily to be sent to [facility]. The pharmacy had been out of the 50mg but have gotten it back in stock

## 2021-05-13 ENCOUNTER — CLINICAL SUPPORT (OUTPATIENT)
Dept: FAMILY MEDICINE CLINIC | Facility: CLINIC | Age: 71
End: 2021-05-13

## 2021-05-13 DIAGNOSIS — J30.1 ALLERGIC RHINITIS DUE TO POLLEN, UNSPECIFIED SEASONALITY: Primary | ICD-10-CM

## 2021-05-13 PROCEDURE — 95117 IMMUNOTHERAPY INJECTIONS: CPT | Performed by: FAMILY MEDICINE

## 2021-05-19 ENCOUNTER — CLINICAL SUPPORT (OUTPATIENT)
Dept: FAMILY MEDICINE CLINIC | Facility: CLINIC | Age: 71
End: 2021-05-19

## 2021-05-19 DIAGNOSIS — J30.1 ALLERGIC RHINITIS DUE TO POLLEN, UNSPECIFIED SEASONALITY: Primary | ICD-10-CM

## 2021-05-19 PROCEDURE — 95117 IMMUNOTHERAPY INJECTIONS: CPT | Performed by: FAMILY MEDICINE

## 2021-05-26 ENCOUNTER — CLINICAL SUPPORT (OUTPATIENT)
Dept: FAMILY MEDICINE CLINIC | Facility: CLINIC | Age: 71
End: 2021-05-26

## 2021-05-26 DIAGNOSIS — J30.1 ALLERGIC RHINITIS DUE TO POLLEN, UNSPECIFIED SEASONALITY: Primary | ICD-10-CM

## 2021-05-26 PROCEDURE — 95117 IMMUNOTHERAPY INJECTIONS: CPT | Performed by: FAMILY MEDICINE

## 2021-06-02 ENCOUNTER — OFFICE VISIT (OUTPATIENT)
Dept: FAMILY MEDICINE CLINIC | Facility: CLINIC | Age: 71
End: 2021-06-02

## 2021-06-02 VITALS
HEART RATE: 62 BPM | TEMPERATURE: 97.5 F | OXYGEN SATURATION: 99 % | SYSTOLIC BLOOD PRESSURE: 140 MMHG | HEIGHT: 70 IN | DIASTOLIC BLOOD PRESSURE: 82 MMHG | RESPIRATION RATE: 18 BRPM | BODY MASS INDEX: 31.55 KG/M2 | WEIGHT: 220.4 LBS

## 2021-06-02 DIAGNOSIS — E11.9 TYPE 2 DIABETES MELLITUS WITHOUT COMPLICATION, WITH LONG-TERM CURRENT USE OF INSULIN (HCC): Primary | ICD-10-CM

## 2021-06-02 DIAGNOSIS — E78.2 MIXED HYPERLIPIDEMIA: ICD-10-CM

## 2021-06-02 DIAGNOSIS — Z79.4 TYPE 2 DIABETES MELLITUS WITHOUT COMPLICATION, WITH LONG-TERM CURRENT USE OF INSULIN (HCC): Primary | ICD-10-CM

## 2021-06-02 DIAGNOSIS — Z87.891 HISTORY OF TOBACCO USE: ICD-10-CM

## 2021-06-02 DIAGNOSIS — I10 ESSENTIAL HYPERTENSION: ICD-10-CM

## 2021-06-02 DIAGNOSIS — E66.3 OVERWEIGHT WITH BODY MASS INDEX (BMI) OF 29 TO 29.9 IN ADULT: ICD-10-CM

## 2021-06-02 DIAGNOSIS — J30.1 ALLERGIC RHINITIS DUE TO POLLEN, UNSPECIFIED SEASONALITY: ICD-10-CM

## 2021-06-02 LAB
BILIRUB BLD-MCNC: NEGATIVE MG/DL
CLARITY, POC: CLEAR
COLOR UR: YELLOW
GLUCOSE UR STRIP-MCNC: NEGATIVE MG/DL
HBA1C MFR BLD: 6.2 %
KETONES UR QL: NEGATIVE
LEUKOCYTE EST, POC: NEGATIVE
NITRITE UR-MCNC: NEGATIVE MG/ML
PH UR: 5 [PH] (ref 5–8)
PROT UR STRIP-MCNC: NEGATIVE MG/DL
RBC # UR STRIP: NEGATIVE /UL
SP GR UR: 1.01 (ref 1–1.03)
UROBILINOGEN UR QL: NORMAL

## 2021-06-02 PROCEDURE — 81002 URINALYSIS NONAUTO W/O SCOPE: CPT | Performed by: FAMILY MEDICINE

## 2021-06-02 PROCEDURE — 83036 HEMOGLOBIN GLYCOSYLATED A1C: CPT | Performed by: FAMILY MEDICINE

## 2021-06-02 PROCEDURE — 99214 OFFICE O/P EST MOD 30 MIN: CPT | Performed by: FAMILY MEDICINE

## 2021-06-02 PROCEDURE — 95117 IMMUNOTHERAPY INJECTIONS: CPT | Performed by: FAMILY MEDICINE

## 2021-06-02 RX ORDER — SIMVASTATIN 80 MG
80 TABLET ORAL NIGHTLY
Qty: 90 TABLET | Refills: 3 | Status: SHIPPED | OUTPATIENT
Start: 2021-06-02 | End: 2022-07-05

## 2021-06-02 NOTE — PROGRESS NOTES
Chief Complaint  Diabetes, Hypertension, Hyperlipidemia, and Allergies (seasonal allergy injections )    Subjective     CC  Problem List  Visit Diagnosis   Encounters  Notes  Medications  Labs  Result Review Imaging  Media    Herson Ruby presents to [facility] for   Diabetes  He presents for his follow-up diabetic visit. He has type 2 diabetes mellitus. Pertinent negatives for hypoglycemia include no headaches or sweats. Pertinent negatives for diabetes include no chest pain, no polydipsia, no polyuria, no weakness and no weight loss. There are no hypoglycemic complications. There are no diabetic complications. Risk factors for coronary artery disease include diabetes mellitus, dyslipidemia, hypertension, male sex, obesity and family history. Meal planning includes avoidance of concentrated sweets. His breakfast blood glucose range is generally  mg/dl. (Home blood sugar was 107.) An ACE inhibitor/angiotensin II receptor blocker is not being taken. He does not see a podiatrist.Eye exam is not current.   Hypertension  This is a chronic problem. The current episode started more than 1 year ago. The problem is uncontrolled. Pertinent negatives include no chest pain, headaches, orthopnea, palpitations, peripheral edema, PND, shortness of breath or sweats. Risk factors for coronary artery disease include diabetes mellitus, dyslipidemia, family history, obesity and male gender. Current antihypertension treatment includes angiotensin blockers and calcium channel blockers. The current treatment provides moderate improvement.   Hyperlipidemia  This is a chronic problem. The current episode started more than 1 year ago. The problem is controlled. Recent lipid tests were reviewed and are normal. Exacerbating diseases include diabetes and obesity. Pertinent negatives include no chest pain or shortness of breath. Current antihyperlipidemic treatment includes statins and herbal  "therapy. The current treatment provides moderate improvement of lipids. Risk factors for coronary artery disease include dyslipidemia, family history, obesity, male sex, hypertension and diabetes mellitus.       Review of Systems   Constitutional: Negative for activity change, appetite change, fever, unexpected weight gain and unexpected weight loss.   Eyes: Negative for visual disturbance.   Respiratory: Negative for shortness of breath.    Cardiovascular: Negative for chest pain, palpitations, orthopnea and PND.   Gastrointestinal: Negative for abdominal pain, constipation, diarrhea, nausea and vomiting.   Endocrine: Negative for cold intolerance, heat intolerance, polydipsia and polyuria.   Genitourinary: Negative for dysuria.   Skin: Negative for rash.   Neurological: Negative for weakness and numbness.        Objective   Vital Signs:   /82 (BP Location: Right arm, Patient Position: Sitting, Cuff Size: Adult)   Pulse 62   Temp 97.5 °F (36.4 °C) (Temporal)   Resp 18   Ht 176.5 cm (69.5\")   Wt 100 kg (220 lb 6.4 oz)   SpO2 99% Comment: room air  BMI 32.08 kg/m²     Physical Exam  Constitutional:       General: He is not in acute distress.     Appearance: He is well-developed.   Eyes:      Conjunctiva/sclera: Conjunctivae normal.   Neck:      Thyroid: No thyromegaly.      Vascular: No JVD.   Cardiovascular:      Rate and Rhythm: Normal rate and regular rhythm.      Pulses:           Dorsalis pedis pulses are 2+ on the right side and 2+ on the left side.      Heart sounds: Normal heart sounds. No murmur heard.   No friction rub. No gallop.       Comments: Negative More's  Pulmonary:      Effort: Pulmonary effort is normal. No respiratory distress.      Breath sounds: Normal breath sounds. No wheezing or rales.   Musculoskeletal:      Cervical back: Neck supple.      Right lower leg: No edema.      Left lower leg: No edema.   Lymphadenopathy:      Cervical: No cervical adenopathy.   Skin:     Findings: " No rash.   Neurological:      Mental Status: He is alert and oriented to person, place, and time.      Coordination: Coordination normal.        Result Review :Labs  Result Review  Imaging  Med Tab  Media                 Assessment and Plan CC Problem List  Visit Diagnosis  ROS  Review (Popup)  Health Maintenance  Quality  BestPractice  Medications  SmartSets  SnapShot Encounters  Media  Problem List Items Addressed This Visit        High    Type 2 diabetes mellitus without complication, with long-term current use of insulin (CMS/[facility]) - Primary    Overview     A1c 6.2 06/02/2021  A1c 5.8 02/24/2021   A1c 6.1, 11/18/2020  A1c 5.9,  08/2020  Controlled.          Current Assessment & Plan     Diabetes is improving with treatment.   Continue current treatment regimen.  Reminded to bring in blood sugar diary at next visit.  Dietary recommendations for ADA diet.  Regular aerobic exercise.  Diabetes will be reassessed in 3 months.         Relevant Orders    POCT urinalysis dipstick, manual (Completed)    POC Glycosylated Hemoglobin (Hb A1C) (Completed)    Essential hypertension    Overview     Controlled.          Current Assessment & Plan     Hypertension is improving with treatment.  Continue current treatment regimen.  Dietary sodium restriction.  Weight loss.  Regular aerobic exercise.  Blood pressure will be reassessed in 3 months.            Medium    Mixed hyperlipidemia    Overview     Controlled with Fish oil d/c atorvastatin for cost, simvastatin.   Chol/HDL 2.6 08/2020         Current Assessment & Plan     Lipid abnormalities are improving with treatment.  Pharmacotherapy as ordered.  Lipids will be reassessed in 3 months.  He wishes to change from atorvastatin for reasons of cost         Relevant Medications    simvastatin (Zocor) 80 MG tablet       Low    Allergic rhinitis    Overview     Controlled on ImmunoRx  He is compliant with meds         Overweight with body mass index (BMI) of 29 to  29.9 in adult    Overview     Healthy diet and regular exercise discussed and  Encouraged f/u n 2 mos.          History of tobacco use    Overview     2 ppd x 30 yrs  Low dose CT 11/2019 10/2020 negative,   He continues to abstain.                Follow Up Instructions Charge Capture  Follow-up Communications  Return in about 3 months (around 9/2/2021).  Patient was given instructions and counseling regarding his condition or for health maintenance advice. Please see specific information pulled into the AVS if appropriate.

## 2021-06-02 NOTE — ASSESSMENT & PLAN NOTE
Lipid abnormalities are improving with treatment.  Pharmacotherapy as ordered.  Lipids will be reassessed in 3 months.  He wishes to change from atorvastatin for reasons of cost

## 2021-06-09 ENCOUNTER — CLINICAL SUPPORT (OUTPATIENT)
Dept: FAMILY MEDICINE CLINIC | Facility: CLINIC | Age: 71
End: 2021-06-09

## 2021-06-09 DIAGNOSIS — J30.1 ALLERGIC RHINITIS DUE TO POLLEN, UNSPECIFIED SEASONALITY: Primary | ICD-10-CM

## 2021-06-09 PROCEDURE — 95117 IMMUNOTHERAPY INJECTIONS: CPT | Performed by: FAMILY MEDICINE

## 2021-06-16 ENCOUNTER — CLINICAL SUPPORT (OUTPATIENT)
Dept: FAMILY MEDICINE CLINIC | Facility: CLINIC | Age: 71
End: 2021-06-16

## 2021-06-16 DIAGNOSIS — J30.1 ALLERGIC RHINITIS DUE TO POLLEN, UNSPECIFIED SEASONALITY: Primary | ICD-10-CM

## 2021-06-16 PROCEDURE — 95117 IMMUNOTHERAPY INJECTIONS: CPT | Performed by: FAMILY MEDICINE

## 2021-06-30 ENCOUNTER — CLINICAL SUPPORT (OUTPATIENT)
Dept: FAMILY MEDICINE CLINIC | Facility: CLINIC | Age: 71
End: 2021-06-30

## 2021-06-30 DIAGNOSIS — J30.1 ALLERGIC RHINITIS DUE TO POLLEN, UNSPECIFIED SEASONALITY: Primary | ICD-10-CM

## 2021-06-30 PROCEDURE — 95117 IMMUNOTHERAPY INJECTIONS: CPT | Performed by: FAMILY MEDICINE

## 2021-07-14 ENCOUNTER — CLINICAL SUPPORT (OUTPATIENT)
Dept: FAMILY MEDICINE CLINIC | Facility: CLINIC | Age: 71
End: 2021-07-14

## 2021-07-14 DIAGNOSIS — J30.1 ALLERGIC RHINITIS DUE TO POLLEN, UNSPECIFIED SEASONALITY: Primary | ICD-10-CM

## 2021-07-14 PROCEDURE — 95117 IMMUNOTHERAPY INJECTIONS: CPT | Performed by: FAMILY MEDICINE

## 2021-07-21 ENCOUNTER — CLINICAL SUPPORT (OUTPATIENT)
Dept: FAMILY MEDICINE CLINIC | Facility: CLINIC | Age: 71
End: 2021-07-21

## 2021-07-21 DIAGNOSIS — J30.1 ALLERGIC RHINITIS DUE TO POLLEN, UNSPECIFIED SEASONALITY: Primary | ICD-10-CM

## 2021-07-21 PROCEDURE — 95117 IMMUNOTHERAPY INJECTIONS: CPT | Performed by: FAMILY MEDICINE

## 2021-07-29 ENCOUNTER — CLINICAL SUPPORT (OUTPATIENT)
Dept: FAMILY MEDICINE CLINIC | Facility: CLINIC | Age: 71
End: 2021-07-29

## 2021-07-29 DIAGNOSIS — J30.1 ALLERGIC RHINITIS DUE TO POLLEN, UNSPECIFIED SEASONALITY: Primary | ICD-10-CM

## 2021-07-29 PROCEDURE — 95117 IMMUNOTHERAPY INJECTIONS: CPT | Performed by: FAMILY MEDICINE

## 2021-08-11 ENCOUNTER — CLINICAL SUPPORT (OUTPATIENT)
Dept: FAMILY MEDICINE CLINIC | Facility: CLINIC | Age: 71
End: 2021-08-11

## 2021-08-11 DIAGNOSIS — J30.1 ALLERGIC RHINITIS DUE TO POLLEN, UNSPECIFIED SEASONALITY: Primary | ICD-10-CM

## 2021-08-11 PROCEDURE — 36415 COLL VENOUS BLD VENIPUNCTURE: CPT | Performed by: FAMILY MEDICINE

## 2021-08-16 ENCOUNTER — TELEPHONE (OUTPATIENT)
Dept: FAMILY MEDICINE CLINIC | Facility: CLINIC | Age: 71
End: 2021-08-16

## 2021-08-16 NOTE — TELEPHONE ENCOUNTER
Caller: Herson Ruby    Relationship: Self    Best call back number: [phone]    What was the call regarding: PATIENT HAS RECEIVED FINASTERIDE 5MG FROM HIS UROLOGIST AND WANTS TO KNOW IF HE SHOULD DO BLOOD WORK BEFORE STARTING IT. PLEASE CALL AND ADVISE.

## 2021-08-16 NOTE — TELEPHONE ENCOUNTER
Spoke with Ishan. Cisco mahmood to speak with the urologist that gave his the medication. We will be doing a PSA 9/1/2021 as part of his wellness, if thye want one before  let us know.

## 2021-08-25 ENCOUNTER — TELEPHONE (OUTPATIENT)
Dept: FAMILY MEDICINE CLINIC | Facility: CLINIC | Age: 71
End: 2021-08-25

## 2021-08-25 ENCOUNTER — CLINICAL SUPPORT (OUTPATIENT)
Dept: FAMILY MEDICINE CLINIC | Facility: CLINIC | Age: 71
End: 2021-08-25

## 2021-08-25 DIAGNOSIS — J30.1 SEASONAL ALLERGIC RHINITIS DUE TO POLLEN: Primary | ICD-10-CM

## 2021-08-25 PROCEDURE — 95117 IMMUNOTHERAPY INJECTIONS: CPT | Performed by: FAMILY MEDICINE

## 2021-08-25 NOTE — TELEPHONE ENCOUNTER
Spoke with Ishan we have ordered, but we will call and try to cancel, he may also want to call allergy office and let them know that he does not want to order.

## 2021-08-25 NOTE — TELEPHONE ENCOUNTER
Caller: Herson Ruby    Relationship to patient: Self    Best call back number: [phone]    Patient is needing: PATIENT CALLED IN AND ASKED THAT THE ALLERGY SERUM NOT BE REORDERED. HE SAID THAT HE WILL BE SEEING THE DOCTOR SOON AND DOES NOT NEED IT. PLEASE CALL PATIENT AND ADVISE.

## 2021-09-01 ENCOUNTER — OFFICE VISIT (OUTPATIENT)
Dept: FAMILY MEDICINE CLINIC | Facility: CLINIC | Age: 71
End: 2021-09-01

## 2021-09-01 VITALS
BODY MASS INDEX: 32.56 KG/M2 | DIASTOLIC BLOOD PRESSURE: 70 MMHG | RESPIRATION RATE: 18 BRPM | HEIGHT: 69 IN | TEMPERATURE: 97.1 F | WEIGHT: 219.8 LBS | SYSTOLIC BLOOD PRESSURE: 128 MMHG | HEART RATE: 75 BPM | OXYGEN SATURATION: 97 %

## 2021-09-01 DIAGNOSIS — Z85.51 HISTORY OF BLADDER CANCER: ICD-10-CM

## 2021-09-01 DIAGNOSIS — N40.1 BENIGN PROSTATIC HYPERPLASIA WITH URINARY OBSTRUCTION: ICD-10-CM

## 2021-09-01 DIAGNOSIS — Z00.00 MEDICARE ANNUAL WELLNESS VISIT, SUBSEQUENT: Primary | ICD-10-CM

## 2021-09-01 DIAGNOSIS — Z87.891 HISTORY OF TOBACCO USE: ICD-10-CM

## 2021-09-01 DIAGNOSIS — Z12.11 SCREENING FOR COLON CANCER: ICD-10-CM

## 2021-09-01 DIAGNOSIS — I10 ESSENTIAL HYPERTENSION: ICD-10-CM

## 2021-09-01 DIAGNOSIS — E11.9 TYPE 2 DIABETES MELLITUS WITHOUT COMPLICATION, WITH LONG-TERM CURRENT USE OF INSULIN (HCC): ICD-10-CM

## 2021-09-01 DIAGNOSIS — Z13.6 SCREENING FOR AAA (ABDOMINAL AORTIC ANEURYSM): ICD-10-CM

## 2021-09-01 DIAGNOSIS — Z12.5 SCREENING PSA (PROSTATE SPECIFIC ANTIGEN): ICD-10-CM

## 2021-09-01 DIAGNOSIS — Z11.59 NEED FOR HEPATITIS C SCREENING TEST: ICD-10-CM

## 2021-09-01 DIAGNOSIS — J30.1 NON-SEASONAL ALLERGIC RHINITIS DUE TO POLLEN: ICD-10-CM

## 2021-09-01 DIAGNOSIS — E78.2 MIXED HYPERLIPIDEMIA: ICD-10-CM

## 2021-09-01 DIAGNOSIS — E66.09 CLASS 1 OBESITY DUE TO EXCESS CALORIES WITH SERIOUS COMORBIDITY AND BODY MASS INDEX (BMI) OF 32.0 TO 32.9 IN ADULT: ICD-10-CM

## 2021-09-01 DIAGNOSIS — Z79.4 TYPE 2 DIABETES MELLITUS WITHOUT COMPLICATION, WITH LONG-TERM CURRENT USE OF INSULIN (HCC): ICD-10-CM

## 2021-09-01 DIAGNOSIS — N13.8 BENIGN PROSTATIC HYPERPLASIA WITH URINARY OBSTRUCTION: ICD-10-CM

## 2021-09-01 LAB
BILIRUB BLD-MCNC: NEGATIVE MG/DL
CLARITY, POC: CLEAR
COLOR UR: YELLOW
GLUCOSE BLDC GLUCOMTR-MCNC: 94 MG/DL (ref 70–130)
GLUCOSE UR STRIP-MCNC: NEGATIVE MG/DL
HBA1C MFR BLD: 6 %
KETONES UR QL: NEGATIVE
LEUKOCYTE EST, POC: NEGATIVE
NITRITE UR-MCNC: NEGATIVE MG/ML
PH UR: 5 [PH] (ref 5–8)
PROT UR STRIP-MCNC: NEGATIVE MG/DL
RBC # UR STRIP: NEGATIVE /UL
SP GR UR: 1.01 (ref 1–1.03)
UROBILINOGEN UR QL: NORMAL

## 2021-09-01 PROCEDURE — 81002 URINALYSIS NONAUTO W/O SCOPE: CPT | Performed by: FAMILY MEDICINE

## 2021-09-01 PROCEDURE — 99214 OFFICE O/P EST MOD 30 MIN: CPT | Performed by: FAMILY MEDICINE

## 2021-09-01 PROCEDURE — G0439 PPPS, SUBSEQ VISIT: HCPCS | Performed by: FAMILY MEDICINE

## 2021-09-01 PROCEDURE — 36415 COLL VENOUS BLD VENIPUNCTURE: CPT | Performed by: FAMILY MEDICINE

## 2021-09-01 PROCEDURE — 95117 IMMUNOTHERAPY INJECTIONS: CPT | Performed by: FAMILY MEDICINE

## 2021-09-01 PROCEDURE — G0444 DEPRESSION SCREEN ANNUAL: HCPCS | Performed by: FAMILY MEDICINE

## 2021-09-01 PROCEDURE — 83036 HEMOGLOBIN GLYCOSYLATED A1C: CPT | Performed by: FAMILY MEDICINE

## 2021-09-01 RX ORDER — TAMSULOSIN HYDROCHLORIDE 0.4 MG/1
1 CAPSULE ORAL DAILY
COMMUNITY
Start: 2021-07-04 | End: 2021-09-01

## 2021-09-01 RX ORDER — FINASTERIDE 5 MG/1
5 TABLET, FILM COATED ORAL DAILY
Qty: 90 TABLET | Refills: 3 | Status: SHIPPED | OUTPATIENT
Start: 2021-09-01 | End: 2022-10-26

## 2021-09-01 NOTE — PROGRESS NOTES
The ABCs of the Annual Wellness Visit  Subsequent Medicare Wellness Visit    Chief Complaint   Patient presents with   • Medicare Wellness-subsequent   • Diabetes   • Hypertension   • Hyperlipidemia       Subjective   History of Present Illness:  Herson Ruby is a 70 y.o. male who presents for a Subsequent Medicare Wellness Visit. The patient is here: for coordination of medical care to discuss health maintenance and disease prevention to follow up on multiple medical problems. Overall has: moderate activity with work/home activities, exercises 2 - 3 times per week, good appetite, feels well with no complaints, good energy level and is sleeping well. Nutrition: balanced diet. Last tetanus shot was unknown.    Diabetes  He presents for his follow-up diabetic visit. He has type 2 diabetes mellitus. Pertinent negatives for hypoglycemia include no dizziness, headaches, nervousness/anxiousness or sweats. Pertinent negatives for diabetes include no chest pain, no fatigue, no polydipsia, no polyuria and no weakness. There are no hypoglycemic complications. There are no diabetic complications. Risk factors for coronary artery disease include diabetes mellitus, dyslipidemia, hypertension, male sex, obesity and family history. An ACE inhibitor/angiotensin II receptor blocker is not being taken.   Hypertension  This is a chronic problem. The current episode started more than 1 year ago. Pertinent negatives include no chest pain, headaches, malaise/fatigue, orthopnea, palpitations, shortness of breath or sweats. Risk factors for coronary artery disease include diabetes mellitus, dyslipidemia, family history, obesity and male gender. Current antihypertension treatment includes calcium channel blockers and angiotensin blockers. The current treatment provides moderate improvement.   Hyperlipidemia  This is a chronic problem. The current episode started more than 1 year ago. The problem is controlled. Recent lipid tests were  reviewed and are normal. Exacerbating diseases include diabetes and obesity. Pertinent negatives include no chest pain, myalgias or shortness of breath. Current antihyperlipidemic treatment includes statins and herbal therapy. The current treatment provides moderate improvement of lipids. Risk factors for coronary artery disease include family history, dyslipidemia, diabetes mellitus, obesity, male sex and hypertension.       HEALTH RISK ASSESSMENT    Recent Hospitalizations:  No hospitalization(s) within the last year.    Current Medical Providers:  Patient Care Team:  Nunu John MD as PCP - General (Family Medicine)  Chalino Ballard Sr., MD as Consulting Physician (Family Medicine)  Clinton Dorado MD as Consulting Physician (Urology)  Clinton Hugo MD as Consulting Physician (Allergy and Immunology)    Smoking Status:  Social History     Tobacco Use   Smoking Status Former Smoker   • Packs/day: 2.00   • Years: 30.00   • Pack years: 60.00   • Types: Cigarettes   • Start date: 1969   • Quit date: 2006   • Years since quitting: 15.7   Smokeless Tobacco Never Used       Alcohol Consumption:  Social History     Substance and Sexual Activity   Alcohol Use Yes       Depression Screen:   PHQ-2/PHQ-9 Depression Screening 9/1/2021   Little interest or pleasure in doing things 0   Feeling down, depressed, or hopeless 0   Trouble falling or staying asleep, or sleeping too much 0   Feeling tired or having little energy 0   Poor appetite or overeating 0   Feeling bad about yourself - or that you are a failure or have let yourself or your family down 0   Trouble concentrating on things, such as reading the newspaper or watching television 0   Moving or speaking so slowly that other people could have noticed. Or the opposite - being so fidgety or restless that you have been moving around a lot more than usual 0   Thoughts that you would be better off dead, or of hurting yourself in some way 0   Total  Score 0   If you checked off any problems, how difficult have these problems made it for you to do your work, take care of things at home, or get along with other people? Not difficult at all     I spent more than 16 minutes asking patient questions, counseling and documenting in the chart    Fall Risk Screen:  KEZIA Fall Risk Assessment was completed, and patient is at LOW risk for falls.Assessment completed on:9/1/2021    Health Habits and Functional and Cognitive Screening:  Functional & Cognitive Status 9/1/2021   Do you have difficulty preparing food and eating? No   Do you have difficulty bathing yourself, getting dressed or grooming yourself? No   Do you have difficulty using the toilet? No   Do you have difficulty moving around from place to place? No   Do you have trouble with steps or getting out of a bed or a chair? No   Current Diet Well Balanced Diet   Dental Exam Not up to date   Eye Exam Up to date        Eye Exam Comment 2020 [facility] in [place], [place]   Exercise (times per week) 5 times per week   Current Exercises Include Walking;Weightlifting   Current Exercise Activities Include -   Do you need help using the phone?  No   Are you deaf or do you have serious difficulty hearing?  No   Do you need help with transportation? No   Do you need help shopping? No   Do you need help preparing meals?  No   Do you need help with housework?  No   Do you need help with laundry? No   Do you need help taking your medications? No   Do you need help managing money? No   Do you ever drive or ride in a car without wearing a seat belt? No   Have you felt unusual stress, anger or loneliness in the last month? No   Who do you live with? Spouse   If you need help, do you have trouble finding someone available to you? No   Have you been bothered in the last four weeks by sexual problems? No   Do you have difficulty concentrating, remembering or making decisions? No       Does the patient have evidence of  cognitive impairment? No    Asprin use counseling:Does not need ASA (and currently is not on it)    Recent Lab Results:  Lab Results   Component Value Date    GLU 89 09/01/2021    CHLPL 169 09/01/2021    TRIG 119 09/01/2021    HDL 52 09/01/2021    LDL 96 09/01/2021    VLDL 21 09/01/2021    HGBA1C 6.0 09/01/2021        Age-appropriate Screening Schedule:  Refer to the list below for future screening recommendations based on patient's age, sex and/or medical conditions. Orders for these recommended tests are listed in the plan section. The patient has been provided with a written plan.    Health Maintenance   Topic Date Due   • TDAP/TD VACCINES (1 - Tdap) Never done   • ZOSTER VACCINE (2 of 3) 02/26/2013   • DIABETIC EYE EXAM  08/10/2021   • INFLUENZA VACCINE  10/01/2021   • HEMOGLOBIN A1C  03/01/2022   • DIABETIC FOOT EXAM  09/01/2022   • LIPID PANEL  09/01/2022   • URINE MICROALBUMIN  09/01/2022          The following portions of the patient's history were reviewed and updated as appropriate: allergies, current medications, past family history, past medical history, past social history, past surgical history and problem list.    Outpatient Medications Prior to Visit   Medication Sig Dispense Refill   • albuterol sulfate  (90 Base) MCG/ACT inhaler Inhale 2 puffs Every 4 (Four) Hours As Needed for Wheezing.     • amLODIPine (NORVASC) 10 MG tablet TAKE 1 TABLET BY MOUTH EVERY DAY FOR BLOOD PRESSURE 90 tablet 3   • Glucose Blood (Blood Glucose Test) strip 1 strip by In Vitro route Daily. 30 each 12   • ipratropium (ATROVENT) 0.06 % nasal spray INSTILL TWO SPRAYS INTO EACH NOSTRIL TWICE DAILY     • losartan (COZAAR) 50 MG tablet Take 1 tablet by mouth Daily. 90 tablet 4   • meloxicam (MOBIC) 15 MG tablet Take 1 tablet by mouth Daily. 30 tablet 12   • metFORMIN (GLUCOPHAGE) 500 MG tablet TAKE 1 TABLET BY MOUTH TWICE A  tablet 3   • Multiple Vitamins-Minerals (CENTRUM SILVER 50+MEN PO) Take  by mouth.     •  Omega-3 Fatty Acids (FISH OIL) 1000 MG capsule capsule Take 1,000 mg by mouth Daily With Breakfast.     • OneTouch Delica Lancets 33G misc 1 each Daily. 30 each 12   • simvastatin (Zocor) 80 MG tablet Take 1 tablet by mouth Every Night. 90 tablet 3   • vitamin C (ASCORBIC ACID) 500 MG tablet Take 500 mg by mouth Daily.     • cyclobenzaprine (FLEXERIL) 10 MG tablet Take 1 tablet by mouth Daily As Needed for Muscle Spasms. 30 tablet 2   • tamsulosin (FLOMAX) 0.4 MG capsule 24 hr capsule Take 1 capsule by mouth Daily.       Facility-Administered Medications Prior to Visit   Medication Dose Route Frequency Provider Last Rate Last Admin   • Allergy Serum Injection  0.35 mL Subcutaneous Once Chalino Ballard Sr., MD       • Allergy Serum Injection  0.35 mL Subcutaneous Once Chalino Ballard Sr., MD           Patient Active Problem List   Diagnosis   • Mixed hyperlipidemia   • Adenoma of liver   • History of ankle surgery   • Type 2 diabetes mellitus without complication, with long-term current use of insulin (CMS/HCC)   • Actinic keratosis   • Allergic rhinitis   • Essential hypertension   • Class 1 obesity due to excess calories with serious comorbidity and body mass index (BMI) of 32.0 to 32.9 in adult   • Preventative health care   • Screening for colon cancer   • Benign prostatic hyperplasia with urinary obstruction   • Medicare annual wellness visit, subsequent   • History of tobacco use   • Lumbar disc disease   • History of bladder cancer       Advanced Care Planning:  ACP discussion was held with the patient during this visit. Patient has an advance directive in EMR which is still valid.     A face-to-face visit was completed today with patient.  Counseling explanation, and discussion of advanced directives was performed.   The last advanced care visit was performed in 2020.  In a near life ending situation, from which he is not expected to recover functionally, and he is not able to speak for himself, he does not want  life sustaining measures. We discussed feeding tubes, ventilators and cardiac support as well as dialysis.    I spent less than 16 minutes discussing Advanced Care Planning information and documenting in the chart.    Review of Systems   Constitutional: Negative for activity change, appetite change, fatigue, fever, malaise/fatigue and unexpected weight change.   HENT: Negative for congestion, hearing loss, rhinorrhea, sinus pain, sore throat, tinnitus, trouble swallowing and voice change.    Eyes: Negative for visual disturbance.   Respiratory: Negative for apnea, cough, shortness of breath and wheezing.    Cardiovascular: Negative for chest pain, palpitations and orthopnea.   Gastrointestinal: Negative for abdominal pain, blood in stool, constipation, diarrhea, nausea and vomiting.   Endocrine: Negative for cold intolerance, heat intolerance, polydipsia and polyuria.   Genitourinary: Negative for difficulty urinating, dysuria, flank pain, frequency and hematuria.   Musculoskeletal: Positive for arthralgias (multiple stable with prn anti inflammatories. ). Negative for joint swelling and myalgias.   Skin: Negative for rash.   Allergic/Immunologic: Negative for environmental allergies and immunocompromised state.   Neurological: Negative for dizziness, syncope, weakness, numbness and headaches.   Hematological: Negative for adenopathy. Does not bruise/bleed easily.   Psychiatric/Behavioral: Negative for dysphoric mood and suicidal ideas. The patient is not nervous/anxious.        I have reviewed and confirmed the accuracy of the HPI and ROS as documented by the MA/LPN/RN Nunu John MD    Compared to one year ago, the patient feels his physical health is the same.  Compared to one year ago, the patient feels his mental health is the same.    Reviewed chart for potential of high risk medication in the elderly: yes  Reviewed chart for potential of harmful drug interactions in the elderly:yes    Objective   "    Vitals:    09/01/21 1322   BP: 128/70   BP Location: Left arm   Patient Position: Sitting   Cuff Size: Adult   Pulse: 75   Resp: 18   Temp: 97.1 °F (36.2 °C)   TempSrc: Temporal   SpO2: 97%   Weight: 99.7 kg (219 lb 12.8 oz)   Height: 175.3 cm (69\")   PainSc: 0-No pain       Body mass index is 32.46 kg/m².  Discussed the patient's BMI with him. The BMI is above average; BMI management plan is completed.    Physical Exam  Constitutional:       General: He is not in acute distress.     Appearance: Normal appearance. He is well-developed.   HENT:      Head: Normocephalic.      Right Ear: Tympanic membrane and external ear normal.      Left Ear: Tympanic membrane and external ear normal.      Nose: No mucosal edema.      Mouth/Throat:      Mouth: No oral lesions.      Pharynx: No oropharyngeal exudate.   Eyes:      General: No scleral icterus.     Conjunctiva/sclera: Conjunctivae normal.      Pupils: Pupils are equal, round, and reactive to light.   Neck:      Thyroid: No thyromegaly.      Vascular: No JVD.   Cardiovascular:      Rate and Rhythm: Normal rate and regular rhythm.  No extrasystoles are present.     Pulses:           Carotid pulses are 2+ on the right side and 2+ on the left side.       Femoral pulses are 2+ on the right side and 2+ on the left side.       Dorsalis pedis pulses are 2+ on the right side and 2+ on the left side.      Heart sounds: Normal heart sounds. No murmur heard.   No friction rub. No gallop.    Pulmonary:      Effort: Pulmonary effort is normal.      Breath sounds: Normal breath sounds. No decreased breath sounds.   Abdominal:      General: Bowel sounds are normal. There is no distension.      Palpations: Abdomen is soft. There is no mass.      Tenderness: There is no abdominal tenderness.      Hernia: No hernia is present. There is no hernia in the left inguinal area.   Genitourinary:     Testes: Normal.   Musculoskeletal:         General: Normal range of motion.      Cervical " back: Normal range of motion and neck supple. No edema.   Feet:      Right foot:      Protective Sensation: 10 sites tested. 10 sites sensed.      Skin integrity: Skin integrity normal.      Left foot:      Protective Sensation: 10 sites tested. 10 sites sensed.      Skin integrity: Skin integrity normal.   Lymphadenopathy:      Head:      Right side of head: No submandibular adenopathy.      Left side of head: No submandibular adenopathy.      Cervical: No cervical adenopathy.      Upper Body:      Right upper body: No supraclavicular adenopathy.      Left upper body: No supraclavicular adenopathy.   Skin:     General: Skin is warm and dry.      Findings: No ecchymosis, erythema or rash.   Neurological:      Mental Status: He is alert and oriented to person, place, and time. He is not disoriented.      Cranial Nerves: No cranial nerve deficit.      Sensory: No sensory deficit.      Motor: No abnormal muscle tone.      Coordination: Coordination normal.      Deep Tendon Reflexes: Reflexes are normal and symmetric.   Psychiatric:         Speech: Speech normal.         Thought Content: Thought content normal.         Judgment: Judgment normal.     Diabetic Foot Exam Performed and Monofilament Test Performed    Assessment    Medicare Risks and Personalized Health Plan  CMS Preventative Services Quick Reference  Cardiovascular risk    The above risks/problems have been discussed with the patient.  Pertinent information has been shared with the patient in the After Visit Summary.  Follow up plans and orders are seen below in the Assessment/Plan Section.    Problem List Items Addressed This Visit        High    Type 2 diabetes mellitus without complication, with long-term current use of insulin (CMS/[facility])    Overview     A1c 6.0 09/02/2021  A1c 6.2 06/02/2021  A1c 5.8 02/24/2021   A1c 6.1, 11/18/2020  A1c 5.9,  08/2020  Controlled.          Current Assessment & Plan     Diabetes is improving with lifestyle modifications.    Continue current treatment regimen.  Reminded to bring in blood sugar diary at next visit.  Dietary recommendations for ADA diet.  Regular aerobic exercise.  Diabetes will be reassessed in 3 months.         Relevant Orders    POCT Glucose (Completed)    POC Glycosylated Hemoglobin (Hb A1C) (Completed)    Microalbumin / Creatinine Urine Ratio - Urine, Clean Catch (Completed)    Essential hypertension    Overview     Controlled. compliant         Current Assessment & Plan     Hypertension is improving with treatment.  Continue current treatment regimen.  Dietary sodium restriction.  Weight loss.  Regular aerobic exercise.  Blood pressure will be reassessed in 1 yr.         Relevant Orders    CBC & Differential (Completed)    Comprehensive Metabolic Panel (Completed)    TSH (Completed)       Medium    Mixed hyperlipidemia    Overview     Controlled with Fish oil d/c atorvastatin for cost, simvastatin.   Chol/HDL 2.6 08/2020         Current Assessment & Plan     Lipid abnormalities are improving with treatment.  Pharmacotherapy as ordered.  Lipids will be reassessed in 1 year.         Relevant Orders    Lipid Panel With / Chol / HDL Ratio (Completed)       Low    Allergic rhinitis    Overview     Controlled on ImmunoRx  He is compliant with meds.         Class 1 obesity due to excess calories with serious comorbidity and body mass index (BMI) of 32.0 to 32.9 in adult    Overview     Healthy diet and regular exercise discussed and  Encouraged f/u n 2 mos.          Current Assessment & Plan     Patient's (Body mass index is 32.46 kg/m².) indicates that they are obese (BMI >30) with health conditions that include hypertension, diabetes mellitus and dyslipidemias . Weight is improving with treatment. BMI is is above average; BMI management plan is completed. We discussed portion control and increasing exercise.          History of tobacco use    Overview     2 ppd x 30 yrs  Low dose CT 11/2019 10/2020 negative, repeat  ordered.   He continues to abstain.             Unprioritized    Screening for colon cancer    Overview     Colonoscopy 2016 in [place], [place] Repeat 2021.  [facility] green packet         Benign prostatic hyperplasia with urinary obstruction    Overview     Followed with [facility]. On meds compliant         Relevant Medications    finasteride (PROSCAR) 5 MG tablet    Medicare annual wellness visit, subsequent - Primary    Overview     Healthy diet and exercise discussed. Fall prevention, and general safety. Previous lab reviewed. We will notify him of today's lab.          Relevant Orders    POCT urinalysis dipstick, manual (Completed)    History of bladder cancer    Overview     2002, stopped smoking.   Followed with [facility]. Last seen 10/2020           Other Visit Diagnoses     Screening PSA (prostate specific antigen)        Relevant Orders    PSA Screen (Completed)    Need for hepatitis C screening test        Relevant Orders    Hepatitis C Antibody (Completed)    Screening for AAA (abdominal aortic aneurysm)        Relevant Orders    [place] aaa screen limited (Completed)        Follow Up:  Return in about 3 months (around 12/1/2021).     An After Visit Summary and PPPS were given to the patient.    I wore protective equipment throughout this patient encounter to include mask and eye protection. Hand hygiene was performed before donning protective equipment and after removal when leaving the room.

## 2021-09-01 NOTE — ASSESSMENT & PLAN NOTE
Patient's (Body mass index is 32.46 kg/m².) indicates that they are obese (BMI >30) with health conditions that include hypertension, diabetes mellitus and dyslipidemias . Weight is improving with treatment. BMI is is above average; BMI management plan is completed. We discussed portion control and increasing exercise.

## 2021-09-02 LAB
ALBUMIN SERPL-MCNC: 3.8 G/DL (ref 3.7–4.7)
ALBUMIN/CREAT UR: 3 MG/G CREAT (ref 0–29)
ALBUMIN/GLOB SERPL: 1.5 {RATIO} (ref 1.2–2.2)
ALP SERPL-CCNC: 53 IU/L (ref 48–121)
ALT SERPL-CCNC: 40 IU/L (ref 0–44)
AST SERPL-CCNC: 37 IU/L (ref 0–40)
BASOPHILS # BLD AUTO: 0.1 X10E3/UL (ref 0–0.2)
BASOPHILS NFR BLD AUTO: 1 %
BILIRUB SERPL-MCNC: 0.6 MG/DL (ref 0–1.2)
BUN SERPL-MCNC: 13 MG/DL (ref 8–27)
BUN/CREAT SERPL: 12 (ref 10–24)
CALCIUM SERPL-MCNC: 9 MG/DL (ref 8.6–10.2)
CHLORIDE SERPL-SCNC: 102 MMOL/L (ref 96–106)
CHOLEST SERPL-MCNC: 169 MG/DL (ref 100–199)
CHOLEST/HDLC SERPL: 3.3 RATIO (ref 0–5)
CO2 SERPL-SCNC: 26 MMOL/L (ref 20–29)
CREAT SERPL-MCNC: 1.11 MG/DL (ref 0.76–1.27)
CREAT UR-MCNC: 174.5 MG/DL
EOSINOPHIL # BLD AUTO: 0.1 X10E3/UL (ref 0–0.4)
EOSINOPHIL NFR BLD AUTO: 2 %
ERYTHROCYTE [DISTWIDTH] IN BLOOD BY AUTOMATED COUNT: 12.8 % (ref 11.6–15.4)
GLOBULIN SER CALC-MCNC: 2.5 G/DL (ref 1.5–4.5)
GLUCOSE SERPL-MCNC: 89 MG/DL (ref 65–99)
HCT VFR BLD AUTO: 46.3 % (ref 37.5–51)
HCV AB S/CO SERPL IA: <0.1 S/CO RATIO (ref 0–0.9)
HDLC SERPL-MCNC: 52 MG/DL
HGB BLD-MCNC: 15.5 G/DL (ref 13–17.7)
IMM GRANULOCYTES # BLD AUTO: 0 X10E3/UL (ref 0–0.1)
IMM GRANULOCYTES NFR BLD AUTO: 0 %
LDLC SERPL CALC-MCNC: 96 MG/DL (ref 0–99)
LYMPHOCYTES # BLD AUTO: 1.3 X10E3/UL (ref 0.7–3.1)
LYMPHOCYTES NFR BLD AUTO: 21 %
MCH RBC QN AUTO: 29.6 PG (ref 26.6–33)
MCHC RBC AUTO-ENTMCNC: 33.5 G/DL (ref 31.5–35.7)
MCV RBC AUTO: 88 FL (ref 79–97)
MICROALBUMIN UR-MCNC: 5.2 UG/ML
MONOCYTES # BLD AUTO: 0.5 X10E3/UL (ref 0.1–0.9)
MONOCYTES NFR BLD AUTO: 8 %
NEUTROPHILS # BLD AUTO: 4.1 X10E3/UL (ref 1.4–7)
NEUTROPHILS NFR BLD AUTO: 68 %
PLATELET # BLD AUTO: 217 X10E3/UL (ref 150–450)
POTASSIUM SERPL-SCNC: 4.3 MMOL/L (ref 3.5–5.2)
PROT SERPL-MCNC: 6.3 G/DL (ref 6–8.5)
PSA SERPL-MCNC: 2.2 NG/ML (ref 0–4)
RBC # BLD AUTO: 5.24 X10E6/UL (ref 4.14–5.8)
SODIUM SERPL-SCNC: 141 MMOL/L (ref 134–144)
TRIGL SERPL-MCNC: 119 MG/DL (ref 0–149)
TSH SERPL DL<=0.005 MIU/L-ACNC: 1.25 UIU/ML (ref 0.45–4.5)
VLDLC SERPL CALC-MCNC: 21 MG/DL (ref 5–40)
WBC # BLD AUTO: 6 X10E3/UL (ref 3.4–10.8)

## 2021-09-08 ENCOUNTER — HOSPITAL ENCOUNTER (OUTPATIENT)
Dept: ULTRASOUND IMAGING | Facility: HOSPITAL | Age: 71
Discharge: HOME | End: 2021-09-08
Admitting: FAMILY MEDICINE

## 2021-09-08 DIAGNOSIS — Z13.6 SCREENING FOR AAA (ABDOMINAL AORTIC ANEURYSM): ICD-10-CM

## 2021-09-08 PROCEDURE — 76706 US ABDL AORTA SCREEN AAA: CPT

## 2021-09-19 DIAGNOSIS — M51.9 LUMBAR DISC DISEASE: ICD-10-CM

## 2021-09-20 RX ORDER — CYCLOBENZAPRINE HCL 10 MG
10 TABLET ORAL DAILY PRN
Qty: 30 TABLET | Refills: 2 | Status: SHIPPED | OUTPATIENT
Start: 2021-09-20 | End: 2022-12-06 | Stop reason: SDUPTHER

## 2021-09-22 ENCOUNTER — CLINICAL SUPPORT (OUTPATIENT)
Dept: FAMILY MEDICINE CLINIC | Facility: CLINIC | Age: 71
End: 2021-09-22

## 2021-09-22 DIAGNOSIS — J30.1 NON-SEASONAL ALLERGIC RHINITIS DUE TO POLLEN: Primary | ICD-10-CM

## 2021-09-22 PROCEDURE — 95117 IMMUNOTHERAPY INJECTIONS: CPT | Performed by: FAMILY MEDICINE

## 2021-09-25 PROBLEM — R59.1 LYMPHADENOPATHY: Status: RESOLVED | Noted: 2020-03-23 | Resolved: 2021-09-25

## 2021-09-29 ENCOUNTER — CLINICAL SUPPORT (OUTPATIENT)
Dept: FAMILY MEDICINE CLINIC | Facility: CLINIC | Age: 71
End: 2021-09-29

## 2021-09-29 DIAGNOSIS — J30.1 NON-SEASONAL ALLERGIC RHINITIS DUE TO POLLEN: Primary | ICD-10-CM

## 2021-09-29 PROCEDURE — 95117 IMMUNOTHERAPY INJECTIONS: CPT | Performed by: FAMILY MEDICINE

## 2021-10-06 ENCOUNTER — CLINICAL SUPPORT (OUTPATIENT)
Dept: FAMILY MEDICINE CLINIC | Facility: CLINIC | Age: 71
End: 2021-10-06

## 2021-10-06 DIAGNOSIS — J30.1 NON-SEASONAL ALLERGIC RHINITIS DUE TO POLLEN: Primary | ICD-10-CM

## 2021-10-06 PROCEDURE — 95117 IMMUNOTHERAPY INJECTIONS: CPT | Performed by: FAMILY MEDICINE

## 2021-10-14 ENCOUNTER — CLINICAL SUPPORT (OUTPATIENT)
Dept: FAMILY MEDICINE CLINIC | Facility: CLINIC | Age: 71
End: 2021-10-14

## 2021-10-14 DIAGNOSIS — J30.89 SEASONAL ALLERGIC RHINITIS DUE TO FUNGAL SPORES: Primary | ICD-10-CM

## 2021-10-14 PROCEDURE — 95117 IMMUNOTHERAPY INJECTIONS: CPT | Performed by: FAMILY MEDICINE

## 2021-10-20 ENCOUNTER — CLINICAL SUPPORT (OUTPATIENT)
Dept: FAMILY MEDICINE CLINIC | Facility: CLINIC | Age: 71
End: 2021-10-20

## 2021-10-20 DIAGNOSIS — J30.89 SEASONAL ALLERGIC RHINITIS DUE TO FUNGAL SPORES: Primary | ICD-10-CM

## 2021-10-20 PROCEDURE — 95117 IMMUNOTHERAPY INJECTIONS: CPT | Performed by: FAMILY MEDICINE

## 2021-10-27 ENCOUNTER — CLINICAL SUPPORT (OUTPATIENT)
Dept: FAMILY MEDICINE CLINIC | Facility: CLINIC | Age: 71
End: 2021-10-27

## 2021-10-27 DIAGNOSIS — J30.1 NON-SEASONAL ALLERGIC RHINITIS DUE TO POLLEN: Primary | ICD-10-CM

## 2021-10-27 PROCEDURE — 95117 IMMUNOTHERAPY INJECTIONS: CPT | Performed by: FAMILY MEDICINE

## 2021-11-03 ENCOUNTER — CLINICAL SUPPORT (OUTPATIENT)
Dept: FAMILY MEDICINE CLINIC | Facility: CLINIC | Age: 71
End: 2021-11-03

## 2021-11-03 DIAGNOSIS — J30.1 ALLERGIC RHINITIS DUE TO POLLEN, UNSPECIFIED SEASONALITY: Primary | ICD-10-CM

## 2021-11-03 PROCEDURE — 95117 IMMUNOTHERAPY INJECTIONS: CPT | Performed by: FAMILY MEDICINE

## 2021-11-16 DIAGNOSIS — Z87.891 HISTORY OF TOBACCO USE: Primary | ICD-10-CM

## 2021-11-17 ENCOUNTER — CLINICAL SUPPORT (OUTPATIENT)
Dept: FAMILY MEDICINE CLINIC | Facility: CLINIC | Age: 71
End: 2021-11-17

## 2021-11-17 DIAGNOSIS — J30.1 ALLERGIC RHINITIS DUE TO POLLEN, UNSPECIFIED SEASONALITY: Primary | ICD-10-CM

## 2021-11-17 PROCEDURE — 95117 IMMUNOTHERAPY INJECTIONS: CPT | Performed by: FAMILY MEDICINE

## 2021-11-24 ENCOUNTER — CLINICAL SUPPORT (OUTPATIENT)
Dept: FAMILY MEDICINE CLINIC | Facility: CLINIC | Age: 71
End: 2021-11-24

## 2021-11-24 DIAGNOSIS — J30.1 ALLERGIC RHINITIS DUE TO POLLEN, UNSPECIFIED SEASONALITY: Primary | ICD-10-CM

## 2021-11-24 PROCEDURE — 95117 IMMUNOTHERAPY INJECTIONS: CPT | Performed by: FAMILY MEDICINE

## 2021-12-01 ENCOUNTER — OFFICE VISIT (OUTPATIENT)
Dept: FAMILY MEDICINE CLINIC | Facility: CLINIC | Age: 71
End: 2021-12-01

## 2021-12-01 VITALS
HEIGHT: 69 IN | TEMPERATURE: 97.3 F | SYSTOLIC BLOOD PRESSURE: 140 MMHG | HEART RATE: 72 BPM | RESPIRATION RATE: 18 BRPM | WEIGHT: 226.2 LBS | BODY MASS INDEX: 33.5 KG/M2 | DIASTOLIC BLOOD PRESSURE: 86 MMHG | OXYGEN SATURATION: 98 %

## 2021-12-01 DIAGNOSIS — E78.2 MIXED HYPERLIPIDEMIA: ICD-10-CM

## 2021-12-01 DIAGNOSIS — I10 ESSENTIAL HYPERTENSION: ICD-10-CM

## 2021-12-01 DIAGNOSIS — E11.9 TYPE 2 DIABETES MELLITUS WITHOUT COMPLICATION, WITH LONG-TERM CURRENT USE OF INSULIN (HCC): Primary | ICD-10-CM

## 2021-12-01 DIAGNOSIS — Z79.4 TYPE 2 DIABETES MELLITUS WITHOUT COMPLICATION, WITH LONG-TERM CURRENT USE OF INSULIN (HCC): Primary | ICD-10-CM

## 2021-12-01 DIAGNOSIS — Z87.891 HISTORY OF TOBACCO USE: ICD-10-CM

## 2021-12-01 DIAGNOSIS — J30.1 ALLERGIC RHINITIS DUE TO POLLEN, UNSPECIFIED SEASONALITY: ICD-10-CM

## 2021-12-01 DIAGNOSIS — E66.09 CLASS 1 OBESITY DUE TO EXCESS CALORIES WITH SERIOUS COMORBIDITY AND BODY MASS INDEX (BMI) OF 32.0 TO 32.9 IN ADULT: ICD-10-CM

## 2021-12-01 LAB
BILIRUB BLD-MCNC: NEGATIVE MG/DL
CLARITY, POC: CLEAR
COLOR UR: YELLOW
EXPIRATION DATE: NORMAL
GLUCOSE UR STRIP-MCNC: NEGATIVE MG/DL
HBA1C MFR BLD: 6.3 %
KETONES UR QL: NEGATIVE
LEUKOCYTE EST, POC: NEGATIVE
Lab: NORMAL
NITRITE UR-MCNC: NEGATIVE MG/ML
PH UR: 6 [PH] (ref 5–8)
PROT UR STRIP-MCNC: NEGATIVE MG/DL
RBC # UR STRIP: NEGATIVE /UL
SP GR UR: 1.01 (ref 1–1.03)
UROBILINOGEN UR QL: NORMAL

## 2021-12-01 PROCEDURE — 81002 URINALYSIS NONAUTO W/O SCOPE: CPT | Performed by: FAMILY MEDICINE

## 2021-12-01 PROCEDURE — 99214 OFFICE O/P EST MOD 30 MIN: CPT | Performed by: FAMILY MEDICINE

## 2021-12-01 PROCEDURE — 3044F HG A1C LEVEL LT 7.0%: CPT | Performed by: FAMILY MEDICINE

## 2021-12-01 PROCEDURE — 83036 HEMOGLOBIN GLYCOSYLATED A1C: CPT | Performed by: FAMILY MEDICINE

## 2021-12-01 PROCEDURE — 95117 IMMUNOTHERAPY INJECTIONS: CPT | Performed by: FAMILY MEDICINE

## 2021-12-01 RX ORDER — TAMSULOSIN HYDROCHLORIDE 0.4 MG/1
1 CAPSULE ORAL DAILY
COMMUNITY
Start: 2021-10-02 | End: 2022-11-29

## 2021-12-01 NOTE — ASSESSMENT & PLAN NOTE
Diabetes is unchanged.   Continue current treatment regimen.  Dietary recommendations for ADA diet.  Regular aerobic exercise.  Diabetes will be reassessed in 3 months.

## 2021-12-01 NOTE — PROGRESS NOTES
Chief Complaint  Diabetes, Hypertension, and Hyperlipidemia    Subjective     CC  Problem List  Visit Diagnosis   Encounters  Notes  Medications  Labs  Result Review Imaging  Media    Herson Ruby presents to [facility] for   Diabetes  He presents for his follow-up diabetic visit. He has type 2 diabetes mellitus. Pertinent negatives for hypoglycemia include no headaches or sweats. Pertinent negatives for diabetes include no chest pain, no fatigue, no polydipsia, no weakness and no weight loss. There are no hypoglycemic complications. There are no diabetic complications. Risk factors for coronary artery disease include diabetes mellitus, dyslipidemia, hypertension, male sex, obesity and family history. He is following a generally healthy diet. Meal planning includes avoidance of concentrated sweets. His overall blood glucose range is 110-130 mg/dl. (Home blood sugar was 111.) An ACE inhibitor/angiotensin II receptor blocker is not being taken. He does not see a podiatrist.Eye exam is current.   Hypertension  This is a chronic problem. The current episode started more than 1 year ago. The problem is uncontrolled. Pertinent negatives include no chest pain, headaches, orthopnea, palpitations, peripheral edema, PND, shortness of breath or sweats. Risk factors for coronary artery disease include diabetes mellitus, dyslipidemia, male gender, obesity and family history. Current antihypertension treatment includes angiotensin blockers and calcium channel blockers. The current treatment provides moderate improvement.   Hyperlipidemia  This is a chronic problem. The current episode started more than 1 year ago. The problem is controlled. Recent lipid tests were reviewed and are normal. Exacerbating diseases include diabetes and obesity. Pertinent negatives include no chest pain or shortness of breath. Current antihyperlipidemic treatment includes statins and herbal therapy. The current  "treatment provides moderate improvement of lipids. Risk factors for coronary artery disease include diabetes mellitus, dyslipidemia, family history, obesity, male sex and hypertension.       Review of Systems   Constitutional: Negative for fatigue, unexpected weight gain and unexpected weight loss.   HENT: Negative for congestion and rhinorrhea.    Respiratory: Negative for shortness of breath.    Cardiovascular: Negative for chest pain, palpitations, orthopnea, leg swelling and PND.   Endocrine: Negative for cold intolerance, heat intolerance and polydipsia.   Neurological: Negative for weakness and numbness.   Hematological: Negative for adenopathy. Does not bruise/bleed easily.        Objective   Vital Signs:   /86 (BP Location: Right arm, Patient Position: Sitting, Cuff Size: Adult)   Pulse 72   Temp 97.3 °F (36.3 °C) (Temporal)   Resp 18   Ht 175.3 cm (69\")   Wt 103 kg (226 lb 3.2 oz)   SpO2 98% Comment: room air  BMI 33.40 kg/m²     Physical Exam  Constitutional:       General: He is not in acute distress.     Appearance: Normal appearance. He is well-developed.   HENT:      Head: Normocephalic.      Right Ear: Tympanic membrane and external ear normal.      Left Ear: Tympanic membrane and external ear normal.      Nose: No mucosal edema.      Mouth/Throat:      Mouth: No oral lesions.      Pharynx: No oropharyngeal exudate.   Eyes:      General: No scleral icterus.     Conjunctiva/sclera: Conjunctivae normal.      Pupils: Pupils are equal, round, and reactive to light.   Neck:      Thyroid: No thyromegaly.      Vascular: No JVD.   Cardiovascular:      Rate and Rhythm: Normal rate and regular rhythm.  No extrasystoles are present.     Pulses:           Carotid pulses are 2+ on the right side and 2+ on the left side.       Femoral pulses are 2+ on the right side and 2+ on the left side.       Dorsalis pedis pulses are 2+ on the right side and 2+ on the left side.      Heart sounds: Normal heart " sounds. No murmur heard.  No friction rub. No gallop.    Pulmonary:      Effort: Pulmonary effort is normal.      Breath sounds: Normal breath sounds. No decreased breath sounds.   Chest:   Breasts:      Right: No supraclavicular adenopathy.      Left: No supraclavicular adenopathy.       Abdominal:      General: Bowel sounds are normal. There is no distension.      Palpations: Abdomen is soft. There is no mass.      Tenderness: There is no abdominal tenderness.      Hernia: No hernia is present. There is no hernia in the left inguinal area.   Genitourinary:     Testes: Normal.   Musculoskeletal:         General: Normal range of motion.      Cervical back: Normal range of motion and neck supple. No edema.   Feet:      Right foot:      Protective Sensation: 10 sites tested. 10 sites sensed.      Skin integrity: Skin integrity normal.      Left foot:      Protective Sensation: 10 sites tested. 10 sites sensed.      Skin integrity: Skin integrity normal.   Lymphadenopathy:      Head:      Right side of head: No submandibular adenopathy.      Left side of head: No submandibular adenopathy.      Cervical: No cervical adenopathy.      Upper Body:      Right upper body: No supraclavicular adenopathy.      Left upper body: No supraclavicular adenopathy.   Skin:     General: Skin is warm and dry.      Findings: No ecchymosis, erythema or rash.   Neurological:      Mental Status: He is alert and oriented to person, place, and time. He is not disoriented.      Cranial Nerves: No cranial nerve deficit.      Sensory: No sensory deficit.      Motor: No abnormal muscle tone.      Coordination: Coordination normal.      Deep Tendon Reflexes: Reflexes are normal and symmetric.   Psychiatric:         Speech: Speech normal.         Thought Content: Thought content normal.         Judgment: Judgment normal.        Result Review :Labs  Result Review  Imaging  Med Tab  Media                 Assessment and Plan CC Problem List   Visit Diagnosis  ROS  Review (Popup)  [facility]  Quality  BestPractice  Medications  SmartSets  SnapShot Encounters  Media  Problem List Items Addressed This Visit        High    Type 2 diabetes mellitus without complication, with long-term current use of insulin (HCC) - Primary    Overview     A1c 6.3 12/01/2021  A1c 6.0 09/02/2021  A1c 6.2 06/02/2021  A1c 5.8 02/24/2021   A1c 6.1, 11/18/2020  A1c 5.9,  08/2020  Controlled.          Current Assessment & Plan     Diabetes is unchanged.   Continue current treatment regimen.  Dietary recommendations for ADA diet.  Regular aerobic exercise.  Diabetes will be reassessed in 3 months.         Relevant Orders    POCT urinalysis dipstick, manual (Completed)    POC Glycosylated Hemoglobin (Hb A1C) (Completed)    Essential hypertension    Overview     Controlled. Compliant.         Current Assessment & Plan     Hypertension is unchanged.  Continue current treatment regimen.  Weight loss.  Regular aerobic exercise.  Blood pressure will be reassessed in 3 months.            Medium    Mixed hyperlipidemia    Overview     Controlled with Fish oil d/c atorvastatin for cost, simvastatin. Compliant  Chol/HDL 2.6 08/2020         Current Assessment & Plan     Lipid abnormalities are improving with treatment.  Pharmacotherapy as ordered.  Lipids will be reassessed in 3 months.            Low    Allergic rhinitis    Overview     Controlled on ImmunoRx  He is compliant with meds         Class 1 obesity due to excess calories with serious comorbidity and body mass index (BMI) of 32.0 to 32.9 in adult    Overview     Healthy diet and regular exercise discussed and  Encouraged          Current Assessment & Plan     Patient's (Body mass index is 33.4 kg/m².) indicates that they are obese (BMI >30) with health conditions that include hypertension, diabetes mellitus and dyslipidemias . Weight is improving with treatment. BMI is is above average; BMI management plan is  completed. We discussed portion control and increasing exercise.          History of tobacco use    Overview     2 ppd x 30 yrs  Low dose CT 11/2019 10/2020 negative, repeat ordered.   He continues to abstain.                Follow Up Instructions Charge Capture  Follow-up Communications  Return in about 3 months (around 3/1/2022).  Patient was given instructions and counseling regarding his condition or for health maintenance advice. Please see specific information pulled into the AVS if appropriate.

## 2021-12-15 ENCOUNTER — CLINICAL SUPPORT (OUTPATIENT)
Dept: FAMILY MEDICINE CLINIC | Facility: CLINIC | Age: 71
End: 2021-12-15

## 2021-12-15 DIAGNOSIS — J30.1 ALLERGIC RHINITIS DUE TO POLLEN, UNSPECIFIED SEASONALITY: Primary | ICD-10-CM

## 2021-12-15 PROCEDURE — 95117 IMMUNOTHERAPY INJECTIONS: CPT | Performed by: FAMILY MEDICINE

## 2021-12-20 NOTE — ASSESSMENT & PLAN NOTE
Patient's (Body mass index is 33.4 kg/m².) indicates that they are obese (BMI >30) with health conditions that include hypertension, diabetes mellitus and dyslipidemias . Weight is improving with treatment. BMI is is above average; BMI management plan is completed. We discussed portion control and increasing exercise.

## 2021-12-22 ENCOUNTER — CLINICAL SUPPORT (OUTPATIENT)
Dept: FAMILY MEDICINE CLINIC | Facility: CLINIC | Age: 71
End: 2021-12-22

## 2021-12-22 DIAGNOSIS — J30.1 ALLERGIC RHINITIS DUE TO POLLEN, UNSPECIFIED SEASONALITY: Primary | ICD-10-CM

## 2021-12-22 PROCEDURE — 95117 IMMUNOTHERAPY INJECTIONS: CPT | Performed by: FAMILY MEDICINE

## 2021-12-29 ENCOUNTER — CLINICAL SUPPORT (OUTPATIENT)
Dept: FAMILY MEDICINE CLINIC | Facility: CLINIC | Age: 71
End: 2021-12-29

## 2021-12-29 DIAGNOSIS — J30.2 SEASONAL ALLERGIC RHINITIS, UNSPECIFIED TRIGGER: Primary | ICD-10-CM

## 2021-12-29 PROCEDURE — 95117 IMMUNOTHERAPY INJECTIONS: CPT | Performed by: FAMILY MEDICINE

## 2022-01-05 ENCOUNTER — CLINICAL SUPPORT (OUTPATIENT)
Dept: FAMILY MEDICINE CLINIC | Facility: CLINIC | Age: 72
End: 2022-01-05

## 2022-01-05 DIAGNOSIS — J30.2 SEASONAL ALLERGIC RHINITIS, UNSPECIFIED TRIGGER: Primary | ICD-10-CM

## 2022-01-05 PROCEDURE — 95117 IMMUNOTHERAPY INJECTIONS: CPT | Performed by: FAMILY MEDICINE

## 2022-01-19 ENCOUNTER — CLINICAL SUPPORT (OUTPATIENT)
Dept: FAMILY MEDICINE CLINIC | Facility: CLINIC | Age: 72
End: 2022-01-19

## 2022-01-19 DIAGNOSIS — J30.2 SEASONAL ALLERGIC RHINITIS, UNSPECIFIED TRIGGER: Primary | ICD-10-CM

## 2022-01-19 PROCEDURE — 95117 IMMUNOTHERAPY INJECTIONS: CPT | Performed by: FAMILY MEDICINE

## 2022-01-26 ENCOUNTER — CLINICAL SUPPORT (OUTPATIENT)
Dept: FAMILY MEDICINE CLINIC | Facility: CLINIC | Age: 72
End: 2022-01-26

## 2022-01-26 DIAGNOSIS — J30.2 SEASONAL ALLERGIC RHINITIS, UNSPECIFIED TRIGGER: Primary | ICD-10-CM

## 2022-01-26 PROCEDURE — 95117 IMMUNOTHERAPY INJECTIONS: CPT | Performed by: FAMILY MEDICINE

## 2022-02-02 ENCOUNTER — CLINICAL SUPPORT (OUTPATIENT)
Dept: FAMILY MEDICINE CLINIC | Facility: CLINIC | Age: 72
End: 2022-02-02

## 2022-02-02 DIAGNOSIS — J30.1 NON-SEASONAL ALLERGIC RHINITIS DUE TO POLLEN: Primary | ICD-10-CM

## 2022-02-02 PROCEDURE — 95117 IMMUNOTHERAPY INJECTIONS: CPT | Performed by: FAMILY MEDICINE

## 2022-02-09 ENCOUNTER — CLINICAL SUPPORT (OUTPATIENT)
Dept: FAMILY MEDICINE CLINIC | Facility: CLINIC | Age: 72
End: 2022-02-09

## 2022-02-09 DIAGNOSIS — J30.2 SEASONAL ALLERGIC RHINITIS, UNSPECIFIED TRIGGER: Primary | ICD-10-CM

## 2022-02-09 PROCEDURE — 95117 IMMUNOTHERAPY INJECTIONS: CPT | Performed by: FAMILY MEDICINE

## 2022-02-16 ENCOUNTER — CLINICAL SUPPORT (OUTPATIENT)
Dept: FAMILY MEDICINE CLINIC | Facility: CLINIC | Age: 72
End: 2022-02-16

## 2022-02-16 DIAGNOSIS — J30.2 SEASONAL ALLERGIC RHINITIS, UNSPECIFIED TRIGGER: Primary | ICD-10-CM

## 2022-02-16 PROCEDURE — 95117 IMMUNOTHERAPY INJECTIONS: CPT | Performed by: FAMILY MEDICINE

## 2022-02-23 ENCOUNTER — CLINICAL SUPPORT (OUTPATIENT)
Dept: FAMILY MEDICINE CLINIC | Facility: CLINIC | Age: 72
End: 2022-02-23

## 2022-02-23 DIAGNOSIS — J30.2 SEASONAL ALLERGIC RHINITIS, UNSPECIFIED TRIGGER: Primary | ICD-10-CM

## 2022-02-23 PROCEDURE — 95117 IMMUNOTHERAPY INJECTIONS: CPT | Performed by: FAMILY MEDICINE

## 2022-03-02 ENCOUNTER — CLINICAL SUPPORT (OUTPATIENT)
Dept: FAMILY MEDICINE CLINIC | Facility: CLINIC | Age: 72
End: 2022-03-02

## 2022-03-02 DIAGNOSIS — J30.2 SEASONAL ALLERGIC RHINITIS, UNSPECIFIED TRIGGER: Primary | ICD-10-CM

## 2022-03-02 PROCEDURE — 95117 IMMUNOTHERAPY INJECTIONS: CPT | Performed by: FAMILY MEDICINE

## 2022-03-08 ENCOUNTER — ON CAMPUS - OUTPATIENT (AMBULATORY)
Dept: URBAN - METROPOLITAN AREA HOSPITAL 2 | Facility: HOSPITAL | Age: 72
End: 2022-03-08
Payer: MEDICARE

## 2022-03-08 VITALS
OXYGEN SATURATION: 97 % | DIASTOLIC BLOOD PRESSURE: 81 MMHG | SYSTOLIC BLOOD PRESSURE: 131 MMHG | TEMPERATURE: 97.8 F | SYSTOLIC BLOOD PRESSURE: 151 MMHG | SYSTOLIC BLOOD PRESSURE: 160 MMHG | HEIGHT: 69 IN | SYSTOLIC BLOOD PRESSURE: 130 MMHG | SYSTOLIC BLOOD PRESSURE: 159 MMHG | OXYGEN SATURATION: 99 % | RESPIRATION RATE: 17 BRPM | HEART RATE: 58 BPM | RESPIRATION RATE: 16 BRPM | SYSTOLIC BLOOD PRESSURE: 140 MMHG | HEART RATE: 63 BPM | DIASTOLIC BLOOD PRESSURE: 59 MMHG | HEART RATE: 60 BPM | DIASTOLIC BLOOD PRESSURE: 78 MMHG | OXYGEN SATURATION: 94 % | DIASTOLIC BLOOD PRESSURE: 63 MMHG | WEIGHT: 225 LBS | DIASTOLIC BLOOD PRESSURE: 84 MMHG | HEART RATE: 57 BPM | RESPIRATION RATE: 18 BRPM | DIASTOLIC BLOOD PRESSURE: 66 MMHG | SYSTOLIC BLOOD PRESSURE: 148 MMHG | HEART RATE: 68 BPM | SYSTOLIC BLOOD PRESSURE: 127 MMHG | DIASTOLIC BLOOD PRESSURE: 89 MMHG | OXYGEN SATURATION: 95 % | HEART RATE: 61 BPM

## 2022-03-08 DIAGNOSIS — K57.30 DIVERTICULOSIS OF LARGE INTESTINE WITHOUT PERFORATION OR ABS: ICD-10-CM

## 2022-03-08 DIAGNOSIS — Z86.010 PERSONAL HISTORY OF COLONIC POLYPS: ICD-10-CM

## 2022-03-08 DIAGNOSIS — K64.1 SECOND DEGREE HEMORRHOIDS: ICD-10-CM

## 2022-03-08 PROCEDURE — G0105 COLORECTAL SCRN; HI RISK IND: HCPCS | Performed by: INTERNAL MEDICINE

## 2022-03-09 ENCOUNTER — CLINICAL SUPPORT (OUTPATIENT)
Dept: FAMILY MEDICINE CLINIC | Facility: CLINIC | Age: 72
End: 2022-03-09

## 2022-03-09 DIAGNOSIS — J30.2 SEASONAL ALLERGIC RHINITIS, UNSPECIFIED TRIGGER: Primary | ICD-10-CM

## 2022-03-09 PROCEDURE — 95117 IMMUNOTHERAPY INJECTIONS: CPT | Performed by: FAMILY MEDICINE

## 2022-03-18 ENCOUNTER — OFFICE VISIT (OUTPATIENT)
Dept: FAMILY MEDICINE CLINIC | Facility: CLINIC | Age: 72
End: 2022-03-18

## 2022-03-18 VITALS
OXYGEN SATURATION: 99 % | HEIGHT: 69 IN | WEIGHT: 228 LBS | TEMPERATURE: 97.5 F | SYSTOLIC BLOOD PRESSURE: 138 MMHG | DIASTOLIC BLOOD PRESSURE: 64 MMHG | BODY MASS INDEX: 33.77 KG/M2 | HEART RATE: 72 BPM | RESPIRATION RATE: 16 BRPM

## 2022-03-18 DIAGNOSIS — Z79.4 TYPE 2 DIABETES MELLITUS WITHOUT COMPLICATION, WITH LONG-TERM CURRENT USE OF INSULIN: Primary | ICD-10-CM

## 2022-03-18 DIAGNOSIS — E78.2 MIXED HYPERLIPIDEMIA: ICD-10-CM

## 2022-03-18 DIAGNOSIS — I10 ESSENTIAL HYPERTENSION: ICD-10-CM

## 2022-03-18 DIAGNOSIS — E11.9 TYPE 2 DIABETES MELLITUS WITHOUT COMPLICATION, WITH LONG-TERM CURRENT USE OF INSULIN: Primary | ICD-10-CM

## 2022-03-18 DIAGNOSIS — Z96.612 STATUS POST REPLACEMENT OF LEFT SHOULDER JOINT: ICD-10-CM

## 2022-03-18 DIAGNOSIS — E66.09 CLASS 1 OBESITY DUE TO EXCESS CALORIES WITH SERIOUS COMORBIDITY AND BODY MASS INDEX (BMI) OF 33.0 TO 33.9 IN ADULT: ICD-10-CM

## 2022-03-18 DIAGNOSIS — Z87.891 HISTORY OF TOBACCO USE: ICD-10-CM

## 2022-03-18 DIAGNOSIS — J30.2 SEASONAL ALLERGIC RHINITIS, UNSPECIFIED TRIGGER: ICD-10-CM

## 2022-03-18 LAB
BILIRUB BLD-MCNC: NEGATIVE MG/DL
CLARITY, POC: CLEAR
COLOR UR: YELLOW
EXPIRATION DATE: NORMAL
GLUCOSE UR STRIP-MCNC: NEGATIVE MG/DL
HBA1C MFR BLD: 6 %
KETONES UR QL: NEGATIVE
LEUKOCYTE EST, POC: NEGATIVE
Lab: NORMAL
NITRITE UR-MCNC: NEGATIVE MG/ML
PH UR: 7 [PH] (ref 5–8)
PROT UR STRIP-MCNC: NEGATIVE MG/DL
RBC # UR STRIP: NEGATIVE /UL
SP GR UR: 1.01 (ref 1–1.03)
UROBILINOGEN UR QL: NORMAL

## 2022-03-18 PROCEDURE — 83036 HEMOGLOBIN GLYCOSYLATED A1C: CPT | Performed by: FAMILY MEDICINE

## 2022-03-18 PROCEDURE — 99214 OFFICE O/P EST MOD 30 MIN: CPT | Performed by: FAMILY MEDICINE

## 2022-03-18 PROCEDURE — 3044F HG A1C LEVEL LT 7.0%: CPT | Performed by: FAMILY MEDICINE

## 2022-03-18 PROCEDURE — 81002 URINALYSIS NONAUTO W/O SCOPE: CPT | Performed by: FAMILY MEDICINE

## 2022-03-18 PROCEDURE — 95117 IMMUNOTHERAPY INJECTIONS: CPT | Performed by: FAMILY MEDICINE

## 2022-03-18 NOTE — ASSESSMENT & PLAN NOTE
Patient's (Body mass index is 33.67 kg/m².) indicates that they are obese (BMI >30) with health conditions that include hypertension, diabetes mellitus and dyslipidemias . Weight is improving with treatment. BMI is is above average; BMI management plan is completed. We discussed portion control and increasing exercise.

## 2022-03-18 NOTE — PROGRESS NOTES
Chief Complaint  Diabetes, Hypertension, and Hyperlipidemia    Subjective     CC  Problem List  Visit Diagnosis   Encounters  Notes  Medications  Labs  Result Review Imaging  Media    Herson Ruby presents to [facility] for   Diabetes  He presents for his follow-up diabetic visit. He has type 2 diabetes mellitus. Pertinent negatives for hypoglycemia include no headaches, speech difficulty or sweats. Pertinent negatives for diabetes include no chest pain, no fatigue, no polydipsia, no weakness and no weight loss. There are no hypoglycemic complications. There are no diabetic complications. Risk factors for coronary artery disease include diabetes mellitus, male sex, obesity and family history. Current diabetic treatment includes oral agent (monotherapy). He is following a diabetic diet. (Home blood sugar 218 ng/ml) An ACE inhibitor/angiotensin II receptor blocker is not being taken. He does not see a podiatrist.Eye exam is current (09/2021 [facility]).   Hypertension  This is a chronic problem. The current episode started more than 1 year ago. The problem is uncontrolled. Pertinent negatives include no chest pain, headaches, malaise/fatigue, orthopnea, palpitations, peripheral edema, PND, shortness of breath or sweats. Risk factors for coronary artery disease include dyslipidemia, diabetes mellitus, family history, male gender and obesity. Current antihypertension treatment includes calcium channel blockers and angiotensin blockers. The current treatment provides moderate improvement.   Hyperlipidemia  This is a chronic problem. The current episode started more than 1 year ago. The problem is controlled. Recent lipid tests were reviewed and are normal. Exacerbating diseases include diabetes and obesity. Pertinent negatives include no chest pain or shortness of breath. Current antihyperlipidemic treatment includes statins and herbal therapy. The current  "treatment provides moderate improvement of lipids. Risk factors for coronary artery disease include diabetes mellitus, dyslipidemia, hypertension, obesity, family history and male sex.       Review of Systems   Constitutional: Negative for fatigue, malaise/fatigue and unexpected weight loss.   HENT: Negative for congestion.    Eyes: Negative for visual disturbance.   Respiratory: Negative for shortness of breath.    Cardiovascular: Negative for chest pain, palpitations, orthopnea and PND.   Gastrointestinal: Negative for abdominal pain, constipation, diarrhea, nausea and vomiting.   Endocrine: Negative for cold intolerance, heat intolerance and polydipsia.   Neurological: Negative for speech difficulty, weakness and numbness.        Objective   Vital Signs:   /64 (BP Location: Right arm, Patient Position: Sitting, Cuff Size: Adult)   Pulse 72   Temp 97.5 °F (36.4 °C) (Temporal)   Resp 16   Ht 175.3 cm (69\")   Wt 103 kg (228 lb)   SpO2 99% Comment: Room air  BMI 33.67 kg/m²     Physical Exam  Constitutional:       General: He is not in acute distress.     Appearance: He is obese.   Neck:      Thyroid: No thyromegaly.      Vascular: No JVD.   Cardiovascular:      Rate and Rhythm: Normal rate and regular rhythm.      Pulses:           Dorsalis pedis pulses are 2+ on the right side and 2+ on the left side.      Heart sounds: No murmur heard.     Comments: Neg More's  Pulmonary:      Effort: Pulmonary effort is normal.      Breath sounds: Normal breath sounds.   Musculoskeletal:      Cervical back: Neck supple.      Right lower leg: No edema.      Left lower leg: No edema.   Lymphadenopathy:      Cervical: No cervical adenopathy.   Neurological:      Mental Status: He is alert.        Result Review :Labs  Result Review  Imaging  Med Tab  Media                 Assessment and Plan CC Problem List  Visit Diagnosis  ROS  Review (Popup)  Health Maintenance  Quality  BestPractice  Medications  " Propable  SnapShot Encounters  Media  Problem List Items Addressed This Visit        High    Type 2 diabetes mellitus without complication, with long-term current use of insulin (HCC) - Primary    Overview     A1c 6.0 03/18/2022 controlled.   A1c 6.3 12/01/2021  A1c 6.0 09/02/2021  A1c 6.2 06/02/2021  A1c 5.8 02/24/2021   A1c 6.1, 11/18/2020  A1c 5.9,  08/2020  Controlled.            Current Assessment & Plan     Diabetes is improving with treatment.   Continue current treatment regimen.  Diabetes will be reassessed in 3 months.           Relevant Orders    POCT urinalysis dipstick, manual (Completed)    POC Glycosylated Hemoglobin (Hb A1C) (Completed)    Essential hypertension    Overview     Controlled. Compliant           Current Assessment & Plan     Hypertension is improving with lifestyle modifications.  Continue current treatment regimen.  Dietary sodium restriction.  Weight loss.  Regular aerobic exercise.  Blood pressure will be reassessed in 3 months.              Medium    Mixed hyperlipidemia    Overview     Controlled with Fish oil d/c atorvastatin for cost, simvastatin. Compliant             Current Assessment & Plan     Lipid abnormalities are improving with treatment.  Pharmacotherapy as ordered.  Lipids will be reassessed in 3 months.              Low    Allergic rhinitis    Overview     Controlled on ImmunoRx given.   He is compliant with meds           Class 1 obesity due to excess calories with serious comorbidity and body mass index (BMI) of 33.0 to 33.9 in adult    Overview     Healthy diet and regular exercise discussed and  Encouraged            Current Assessment & Plan     Patient's (Body mass index is 33.67 kg/m².) indicates that they are obese (BMI >30) with health conditions that include hypertension, diabetes mellitus and dyslipidemias . Weight is improving with treatment. BMI is is above average; BMI management plan is completed. We discussed portion control and increasing  exercise.            History of tobacco use    Overview     2 ppd x 30 yrs  Low dose CT 11/2019 10/2020 negative, repeat ordered.   He continues to abstain.               Unprioritized    Status post replacement of left shoulder joint    Overview     Kaylee Mcfarland                 Follow Up Instructions Charge Capture  Follow-up Communications  Return in about 3 months (around 6/18/2022).  Patient was given instructions and counseling regarding his condition or for health maintenance advice. Please see specific information pulled into the AVS if appropriate.

## 2022-03-23 ENCOUNTER — CLINICAL SUPPORT (OUTPATIENT)
Dept: FAMILY MEDICINE CLINIC | Facility: CLINIC | Age: 72
End: 2022-03-23

## 2022-03-23 DIAGNOSIS — J30.2 SEASONAL ALLERGIC RHINITIS, UNSPECIFIED TRIGGER: Primary | ICD-10-CM

## 2022-03-23 PROCEDURE — 95117 IMMUNOTHERAPY INJECTIONS: CPT | Performed by: FAMILY MEDICINE

## 2022-03-30 ENCOUNTER — CLINICAL SUPPORT (OUTPATIENT)
Dept: FAMILY MEDICINE CLINIC | Facility: CLINIC | Age: 72
End: 2022-03-30

## 2022-03-30 DIAGNOSIS — J30.2 SEASONAL ALLERGIC RHINITIS, UNSPECIFIED TRIGGER: Primary | ICD-10-CM

## 2022-03-30 PROCEDURE — 95117 IMMUNOTHERAPY INJECTIONS: CPT | Performed by: FAMILY MEDICINE

## 2022-04-08 DIAGNOSIS — I10 ESSENTIAL HYPERTENSION: ICD-10-CM

## 2022-04-11 RX ORDER — AMLODIPINE BESYLATE 10 MG/1
TABLET ORAL
Qty: 90 TABLET | Refills: 0 | Status: SHIPPED | OUTPATIENT
Start: 2022-04-11 | End: 2022-08-12

## 2022-04-13 ENCOUNTER — CLINICAL SUPPORT (OUTPATIENT)
Dept: FAMILY MEDICINE CLINIC | Facility: CLINIC | Age: 72
End: 2022-04-13

## 2022-04-13 DIAGNOSIS — J30.2 SEASONAL ALLERGIC RHINITIS, UNSPECIFIED TRIGGER: Primary | ICD-10-CM

## 2022-04-13 PROCEDURE — 95117 IMMUNOTHERAPY INJECTIONS: CPT | Performed by: FAMILY MEDICINE

## 2022-04-20 ENCOUNTER — CLINICAL SUPPORT (OUTPATIENT)
Dept: FAMILY MEDICINE CLINIC | Facility: CLINIC | Age: 72
End: 2022-04-20

## 2022-04-20 DIAGNOSIS — J30.2 SEASONAL ALLERGIC RHINITIS, UNSPECIFIED TRIGGER: Primary | ICD-10-CM

## 2022-04-20 PROCEDURE — 95117 IMMUNOTHERAPY INJECTIONS: CPT | Performed by: FAMILY MEDICINE

## 2022-04-27 ENCOUNTER — CLINICAL SUPPORT (OUTPATIENT)
Dept: FAMILY MEDICINE CLINIC | Facility: CLINIC | Age: 72
End: 2022-04-27

## 2022-04-27 DIAGNOSIS — J30.1 NON-SEASONAL ALLERGIC RHINITIS DUE TO POLLEN: Primary | ICD-10-CM

## 2022-04-27 PROCEDURE — 95117 IMMUNOTHERAPY INJECTIONS: CPT | Performed by: FAMILY MEDICINE

## 2022-05-04 ENCOUNTER — CLINICAL SUPPORT (OUTPATIENT)
Dept: FAMILY MEDICINE CLINIC | Facility: CLINIC | Age: 72
End: 2022-05-04

## 2022-05-04 DIAGNOSIS — J30.1 NON-SEASONAL ALLERGIC RHINITIS DUE TO POLLEN: Primary | ICD-10-CM

## 2022-05-04 PROCEDURE — 95117 IMMUNOTHERAPY INJECTIONS: CPT | Performed by: FAMILY MEDICINE

## 2022-05-11 ENCOUNTER — CLINICAL SUPPORT (OUTPATIENT)
Dept: FAMILY MEDICINE CLINIC | Facility: CLINIC | Age: 72
End: 2022-05-11

## 2022-05-11 DIAGNOSIS — J30.1 NON-SEASONAL ALLERGIC RHINITIS DUE TO POLLEN: Primary | ICD-10-CM

## 2022-05-11 PROCEDURE — 95117 IMMUNOTHERAPY INJECTIONS: CPT | Performed by: FAMILY MEDICINE

## 2022-05-19 ENCOUNTER — CLINICAL SUPPORT (OUTPATIENT)
Dept: FAMILY MEDICINE CLINIC | Facility: CLINIC | Age: 72
End: 2022-05-19

## 2022-05-19 DIAGNOSIS — J30.1 NON-SEASONAL ALLERGIC RHINITIS DUE TO POLLEN: Primary | ICD-10-CM

## 2022-05-19 PROCEDURE — 95117 IMMUNOTHERAPY INJECTIONS: CPT | Performed by: FAMILY MEDICINE

## 2022-05-26 ENCOUNTER — CLINICAL SUPPORT (OUTPATIENT)
Dept: FAMILY MEDICINE CLINIC | Facility: CLINIC | Age: 72
End: 2022-05-26

## 2022-05-26 DIAGNOSIS — J30.1 ALLERGIC RHINITIS DUE TO POLLEN, UNSPECIFIED SEASONALITY: Primary | ICD-10-CM

## 2022-05-26 PROCEDURE — 95117 IMMUNOTHERAPY INJECTIONS: CPT | Performed by: FAMILY MEDICINE

## 2022-06-01 ENCOUNTER — CLINICAL SUPPORT (OUTPATIENT)
Dept: FAMILY MEDICINE CLINIC | Facility: CLINIC | Age: 72
End: 2022-06-01

## 2022-06-01 DIAGNOSIS — J30.1 ALLERGIC RHINITIS DUE TO POLLEN, UNSPECIFIED SEASONALITY: Primary | ICD-10-CM

## 2022-06-01 PROCEDURE — 95117 IMMUNOTHERAPY INJECTIONS: CPT | Performed by: FAMILY MEDICINE

## 2022-06-08 ENCOUNTER — HOSPITAL ENCOUNTER (OUTPATIENT)
Dept: GENERAL RADIOLOGY | Facility: HOSPITAL | Age: 72
Discharge: HOME | End: 2022-06-08

## 2022-06-08 ENCOUNTER — OFFICE VISIT (OUTPATIENT)
Dept: FAMILY MEDICINE CLINIC | Facility: CLINIC | Age: 72
End: 2022-06-08

## 2022-06-08 ENCOUNTER — HOSPITAL ENCOUNTER (OUTPATIENT)
Dept: GENERAL RADIOLOGY | Facility: HOSPITAL | Age: 72
End: 2022-06-08

## 2022-06-08 VITALS
OXYGEN SATURATION: 97 % | HEART RATE: 78 BPM | WEIGHT: 215.2 LBS | SYSTOLIC BLOOD PRESSURE: 140 MMHG | RESPIRATION RATE: 18 BRPM | TEMPERATURE: 98 F | HEIGHT: 69 IN | DIASTOLIC BLOOD PRESSURE: 84 MMHG | BODY MASS INDEX: 31.87 KG/M2

## 2022-06-08 DIAGNOSIS — M51.36 DEGENERATION OF INTERVERTEBRAL DISC OF LUMBAR SPINE WITHOUT DISC HERNIATION: ICD-10-CM

## 2022-06-08 DIAGNOSIS — M54.41 ACUTE MIDLINE LOW BACK PAIN WITH RIGHT-SIDED SCIATICA: ICD-10-CM

## 2022-06-08 DIAGNOSIS — Z87.891 HISTORY OF TOBACCO USE: ICD-10-CM

## 2022-06-08 DIAGNOSIS — E66.09 CLASS 1 OBESITY DUE TO EXCESS CALORIES WITH SERIOUS COMORBIDITY AND BODY MASS INDEX (BMI) OF 33.0 TO 33.9 IN ADULT: ICD-10-CM

## 2022-06-08 DIAGNOSIS — M25.551 BILATERAL HIP PAIN: Primary | ICD-10-CM

## 2022-06-08 DIAGNOSIS — I10 ESSENTIAL HYPERTENSION: ICD-10-CM

## 2022-06-08 DIAGNOSIS — Z79.4 TYPE 2 DIABETES MELLITUS WITHOUT COMPLICATION, WITH LONG-TERM CURRENT USE OF INSULIN: ICD-10-CM

## 2022-06-08 DIAGNOSIS — E78.2 MIXED HYPERLIPIDEMIA: ICD-10-CM

## 2022-06-08 DIAGNOSIS — E11.9 TYPE 2 DIABETES MELLITUS WITHOUT COMPLICATION, WITH LONG-TERM CURRENT USE OF INSULIN: ICD-10-CM

## 2022-06-08 DIAGNOSIS — M25.552 BILATERAL HIP PAIN: Primary | ICD-10-CM

## 2022-06-08 PROCEDURE — 99213 OFFICE O/P EST LOW 20 MIN: CPT | Performed by: FAMILY MEDICINE

## 2022-06-08 PROCEDURE — 73521 X-RAY EXAM HIPS BI 2 VIEWS: CPT

## 2022-06-08 PROCEDURE — 72110 X-RAY EXAM L-2 SPINE 4/>VWS: CPT

## 2022-06-08 RX ORDER — PREDNISONE 10 MG/1
10 TABLET ORAL TAKE AS DIRECTED
Qty: 35 TABLET | Refills: 0 | Status: SHIPPED | OUTPATIENT
Start: 2022-06-08 | End: 2022-06-23

## 2022-06-08 NOTE — PROGRESS NOTES
Chief Complaint  Hip Pain, Diabetes, Hypertension, and Hyperlipidemia    Subjective     CC  Problem List  Visit Diagnosis   Encounters  Notes  Medications  Labs  Result Review Imaging  Media    Herson Ruby presents to [facility] for   Hip Pain   The incident occurred more than 1 week ago (05/25/22). There was no injury mechanism. The pain is present in the right hip and right thigh. The quality of the pain is described as aching and burning. The pain is at a severity of 4/10. The pain is mild. The pain has been constant since onset. Pertinent negatives include no inability to bear weight, numbness or tingling. He reports no foreign bodies present. The symptoms are aggravated by movement (Sitting). Treatments tried: Cyclobenzaprine and Meloxicam  The treatment provided mild relief.   Diabetes  He presents for his follow-up diabetic visit. He has type 2 diabetes mellitus. Pertinent negatives for hypoglycemia include no confusion, dizziness or headaches. Pertinent negatives for diabetes include no chest pain, no fatigue, no polyphagia, no polyuria, no visual change, no weakness and no weight loss. Risk factors for coronary artery disease include diabetes mellitus, dyslipidemia, hypertension, obesity and male sex. Current diabetic treatment includes oral agent (monotherapy). He is following a diabetic and generally healthy diet. Meal planning includes avoidance of concentrated sweets. He participates in exercise three times a week. His overall blood glucose range is 110-130 mg/dl. An ACE inhibitor/angiotensin II receptor blocker is not being taken. He does not see a podiatrist.Eye exam is current.   Hypertension  This is a chronic problem. The current episode started more than 1 year ago. The problem is uncontrolled. Pertinent negatives include no chest pain, headaches, orthopnea, palpitations, PND or shortness of breath. Risk factors for coronary artery disease include  diabetes mellitus, dyslipidemia, male gender and obesity. Current antihypertension treatment includes calcium channel blockers and angiotensin blockers. The current treatment provides moderate improvement.   Hyperlipidemia  This is a chronic problem. The current episode started more than 1 year ago. The problem is controlled. Recent lipid tests were reviewed and are normal. Exacerbating diseases include diabetes and obesity. Pertinent negatives include no chest pain or shortness of breath. Current antihyperlipidemic treatment includes statins and herbal therapy. The current treatment provides moderate improvement of lipids. Risk factors for coronary artery disease include diabetes mellitus, dyslipidemia, family history, male sex, hypertension and obesity.   Back Pain  This is a recurrent problem. The current episode started in the past 7 days. The problem has been gradually improving since onset. The pain is present in the lumbar spine and gluteal. The quality of the pain is described as aching. The pain is moderate. The symptoms are aggravated by standing. Pertinent negatives include no bladder incontinence, bowel incontinence, chest pain, fever, headaches, numbness, paresthesias, tingling, weakness or weight loss.       Review of Systems   Constitutional: Negative for fatigue, fever and unexpected weight loss.   Respiratory: Negative for shortness of breath.    Cardiovascular: Negative for chest pain, palpitations, orthopnea, leg swelling and PND.   Gastrointestinal: Negative for bowel incontinence.   Endocrine: Negative for cold intolerance, heat intolerance, polyphagia and polyuria.   Genitourinary: Negative for urinary incontinence.   Musculoskeletal: Positive for back pain.   Skin: Negative for rash.   Neurological: Negative for dizziness, tingling, weakness, numbness, paresthesias and confusion.   Hematological: Negative for adenopathy. Does not bruise/bleed easily.        Objective   Vital Signs:   /84  "  Pulse 78   Temp 98 °F (36.7 °C) (Temporal)   Resp 18   Ht 175.3 cm (69\")   Wt 97.6 kg (215 lb 3.2 oz)   SpO2 97% Comment: room air  BMI 31.78 kg/m²     Physical Exam  Constitutional:       General: He is not in acute distress.  Cardiovascular:      Rate and Rhythm: Normal rate and regular rhythm.      Heart sounds: No murmur heard.  Pulmonary:      Effort: Pulmonary effort is normal.      Breath sounds: Normal breath sounds. No wheezing.   Musculoskeletal:      Cervical back: Neck supple.      Lumbar back: No swelling, deformity or tenderness. Decreased range of motion. Negative right straight leg raise test and negative left straight leg raise test.      Right hip: No deformity. Normal range of motion. Normal strength.      Left hip: No deformity. Normal range of motion. Normal strength.   Lymphadenopathy:      Cervical: No cervical adenopathy.   Skin:     Findings: No rash.   Neurological:      Mental Status: He is alert and oriented to person, place, and time.      Sensory: No sensory deficit.      Motor: No weakness.      Gait: Gait normal.        Result Review :Labs  Result Review  Imaging  Med Tab  Media                 Assessment and Plan CC Problem List  Visit Diagnosis  ROS  Review (Popup)  Health Maintenance  Quality  BestPractice  Medications  SmartSets  SnapShot Encounters  Media  Problem List Items Addressed This Visit        High    Type 2 diabetes mellitus without complication, with long-term current use of insulin ([facility])    Overview     A1c 6.0 03/18/2022 controlled. He reports BS of 120  A1c 6.3 12/01/2021  A1c 6.0 09/02/2021  A1c 6.2 06/02/2021  A1c 5.8 02/24/2021   A1c 6.1, 11/18/2020  A1c 5.9,  08/2020  Controlled.            Essential hypertension    Overview     Controlled. Compliant continue meds.               Medium    Mixed hyperlipidemia    Overview     Controlled with Fish oil d/c atorvastatin for cost, simvastatin. Compliant                Low    Class 1 obesity " due to excess calories with serious comorbidity and body mass index (BMI) of 33.0 to 33.9 in adult    Overview     Healthy diet and regular exercise discussed and  Encouraged            History of tobacco use    Overview     2 ppd x 30 yrs  Low dose CT 11/2019 10/2020 negative, repeat ordered.   He continues to abstain.               Unprioritized    Degeneration of intervertebral disc of lumbar spine without disc herniation    Current Assessment & Plan     Systemic steroids, A1c should support  Ice heat and ROM notify us should sxs not improve.            Relevant Medications    predniSONE (DELTASONE) 10 MG tablet      Other Visit Diagnoses     Bilateral hip pain    -  Primary    Neg exam, neg xray, pain likely radicular     Relevant Orders    XR Hips Bilateral With or Without Pelvis 2 View (Completed)    Acute midline low back pain with right-sided sciatica        .     Relevant Orders    XR Spine Lumbar Complete 4+VW (Completed)          Follow Up Instructions Charge Capture  Follow-up Communications  Return in about 4 weeks (around 7/6/2022), or if symptoms worsen or fail to improve.  Patient was given instructions and counseling regarding his condition or for health maintenance advice. Please see specific information pulled into the AVS if appropriate.

## 2022-06-15 ENCOUNTER — CLINICAL SUPPORT (OUTPATIENT)
Dept: FAMILY MEDICINE CLINIC | Facility: CLINIC | Age: 72
End: 2022-06-15

## 2022-06-15 DIAGNOSIS — J30.1 ALLERGIC RHINITIS DUE TO POLLEN, UNSPECIFIED SEASONALITY: Primary | ICD-10-CM

## 2022-06-15 PROCEDURE — 95117 IMMUNOTHERAPY INJECTIONS: CPT | Performed by: FAMILY MEDICINE

## 2022-06-18 PROBLEM — M51.36 DEGENERATION OF INTERVERTEBRAL DISC OF LUMBAR SPINE WITHOUT DISC HERNIATION: Status: ACTIVE | Noted: 2022-06-18

## 2022-06-18 PROBLEM — M51.369 DEGENERATION OF INTERVERTEBRAL DISC OF LUMBAR SPINE WITHOUT DISC HERNIATION: Status: ACTIVE | Noted: 2022-06-18

## 2022-06-22 ENCOUNTER — CLINICAL SUPPORT (OUTPATIENT)
Dept: FAMILY MEDICINE CLINIC | Facility: CLINIC | Age: 72
End: 2022-06-22

## 2022-06-22 DIAGNOSIS — J30.1 NON-SEASONAL ALLERGIC RHINITIS DUE TO POLLEN: Primary | ICD-10-CM

## 2022-06-22 PROCEDURE — 95117 IMMUNOTHERAPY INJECTIONS: CPT | Performed by: FAMILY MEDICINE

## 2022-06-29 ENCOUNTER — OFFICE VISIT (OUTPATIENT)
Dept: FAMILY MEDICINE CLINIC | Facility: CLINIC | Age: 72
End: 2022-06-29

## 2022-06-29 VITALS
WEIGHT: 212.8 LBS | DIASTOLIC BLOOD PRESSURE: 78 MMHG | HEIGHT: 69 IN | HEART RATE: 75 BPM | TEMPERATURE: 97.8 F | RESPIRATION RATE: 18 BRPM | OXYGEN SATURATION: 98 % | SYSTOLIC BLOOD PRESSURE: 116 MMHG | BODY MASS INDEX: 31.52 KG/M2

## 2022-06-29 DIAGNOSIS — Z87.891 HISTORY OF TOBACCO USE: ICD-10-CM

## 2022-06-29 DIAGNOSIS — E78.2 MIXED HYPERLIPIDEMIA: ICD-10-CM

## 2022-06-29 DIAGNOSIS — J30.1 NON-SEASONAL ALLERGIC RHINITIS DUE TO POLLEN: ICD-10-CM

## 2022-06-29 DIAGNOSIS — I10 ESSENTIAL HYPERTENSION: ICD-10-CM

## 2022-06-29 DIAGNOSIS — E66.09 CLASS 1 OBESITY DUE TO EXCESS CALORIES WITH SERIOUS COMORBIDITY AND BODY MASS INDEX (BMI) OF 31.0 TO 31.9 IN ADULT: ICD-10-CM

## 2022-06-29 DIAGNOSIS — Z79.4 TYPE 2 DIABETES MELLITUS WITHOUT COMPLICATION, WITH LONG-TERM CURRENT USE OF INSULIN: Primary | ICD-10-CM

## 2022-06-29 DIAGNOSIS — E11.9 TYPE 2 DIABETES MELLITUS WITHOUT COMPLICATION, WITH LONG-TERM CURRENT USE OF INSULIN: Primary | ICD-10-CM

## 2022-06-29 DIAGNOSIS — M51.36 DEGENERATION OF INTERVERTEBRAL DISC OF LUMBAR SPINE WITHOUT DISC HERNIATION: ICD-10-CM

## 2022-06-29 LAB
BILIRUB BLD-MCNC: NEGATIVE MG/DL
CLARITY, POC: CLEAR
COLOR UR: YELLOW
EXPIRATION DATE: NORMAL
GLUCOSE UR STRIP-MCNC: NEGATIVE MG/DL
HBA1C MFR BLD: 6.4 %
KETONES UR QL: NEGATIVE
LEUKOCYTE EST, POC: NEGATIVE
Lab: NORMAL
NITRITE UR-MCNC: NEGATIVE MG/ML
PH UR: 7 [PH] (ref 5–8)
PROT UR STRIP-MCNC: NEGATIVE MG/DL
RBC # UR STRIP: NEGATIVE /UL
SP GR UR: 1.01 (ref 1–1.03)
UROBILINOGEN UR QL: NORMAL

## 2022-06-29 PROCEDURE — 95117 IMMUNOTHERAPY INJECTIONS: CPT | Performed by: FAMILY MEDICINE

## 2022-06-29 PROCEDURE — 99214 OFFICE O/P EST MOD 30 MIN: CPT | Performed by: FAMILY MEDICINE

## 2022-06-29 PROCEDURE — 81002 URINALYSIS NONAUTO W/O SCOPE: CPT | Performed by: FAMILY MEDICINE

## 2022-06-29 PROCEDURE — 3044F HG A1C LEVEL LT 7.0%: CPT | Performed by: FAMILY MEDICINE

## 2022-06-29 PROCEDURE — 83036 HEMOGLOBIN GLYCOSYLATED A1C: CPT | Performed by: FAMILY MEDICINE

## 2022-06-29 RX ORDER — GABAPENTIN 300 MG/1
300 CAPSULE ORAL 2 TIMES DAILY
Qty: 60 CAPSULE | Refills: 2 | Status: SHIPPED | OUTPATIENT
Start: 2022-06-29

## 2022-07-03 DIAGNOSIS — E78.2 MIXED HYPERLIPIDEMIA: ICD-10-CM

## 2022-07-03 DIAGNOSIS — I10 ESSENTIAL HYPERTENSION: ICD-10-CM

## 2022-07-05 RX ORDER — LOSARTAN POTASSIUM 50 MG/1
TABLET ORAL
Qty: 90 TABLET | Refills: 4 | Status: SHIPPED | OUTPATIENT
Start: 2022-07-05

## 2022-07-05 RX ORDER — SIMVASTATIN 80 MG
TABLET ORAL
Qty: 90 TABLET | Refills: 3 | Status: SHIPPED | OUTPATIENT
Start: 2022-07-05

## 2022-07-06 ENCOUNTER — CLINICAL SUPPORT (OUTPATIENT)
Dept: FAMILY MEDICINE CLINIC | Facility: CLINIC | Age: 72
End: 2022-07-06

## 2022-07-06 DIAGNOSIS — J30.1 NON-SEASONAL ALLERGIC RHINITIS DUE TO POLLEN: Primary | ICD-10-CM

## 2022-07-06 PROCEDURE — 95117 IMMUNOTHERAPY INJECTIONS: CPT | Performed by: FAMILY MEDICINE

## 2022-07-15 ENCOUNTER — CLINICAL SUPPORT (OUTPATIENT)
Dept: FAMILY MEDICINE CLINIC | Facility: CLINIC | Age: 72
End: 2022-07-15

## 2022-07-15 DIAGNOSIS — J30.1 NON-SEASONAL ALLERGIC RHINITIS DUE TO POLLEN: Primary | ICD-10-CM

## 2022-07-15 PROCEDURE — 95117 IMMUNOTHERAPY INJECTIONS: CPT | Performed by: FAMILY MEDICINE

## 2022-07-20 ENCOUNTER — CLINICAL SUPPORT (OUTPATIENT)
Dept: FAMILY MEDICINE CLINIC | Facility: CLINIC | Age: 72
End: 2022-07-20

## 2022-07-20 DIAGNOSIS — J30.1 NON-SEASONAL ALLERGIC RHINITIS DUE TO POLLEN: Primary | ICD-10-CM

## 2022-07-20 PROCEDURE — 95117 IMMUNOTHERAPY INJECTIONS: CPT | Performed by: FAMILY MEDICINE

## 2022-08-06 DIAGNOSIS — I10 ESSENTIAL HYPERTENSION: ICD-10-CM

## 2022-08-10 ENCOUNTER — CLINICAL SUPPORT (OUTPATIENT)
Dept: FAMILY MEDICINE CLINIC | Facility: CLINIC | Age: 72
End: 2022-08-10

## 2022-08-10 VITALS — WEIGHT: 206.5 LBS | BODY MASS INDEX: 30.49 KG/M2

## 2022-08-10 DIAGNOSIS — J30.1 NON-SEASONAL ALLERGIC RHINITIS DUE TO POLLEN: Primary | ICD-10-CM

## 2022-08-10 PROCEDURE — 95117 IMMUNOTHERAPY INJECTIONS: CPT | Performed by: FAMILY MEDICINE

## 2022-08-12 RX ORDER — AMLODIPINE BESYLATE 10 MG/1
TABLET ORAL
Qty: 90 TABLET | Refills: 0 | Status: SHIPPED | OUTPATIENT
Start: 2022-08-12 | End: 2022-09-12

## 2022-08-17 ENCOUNTER — CLINICAL SUPPORT (OUTPATIENT)
Dept: FAMILY MEDICINE CLINIC | Facility: CLINIC | Age: 72
End: 2022-08-17

## 2022-08-17 DIAGNOSIS — J30.1 NON-SEASONAL ALLERGIC RHINITIS DUE TO POLLEN: Primary | ICD-10-CM

## 2022-08-17 PROCEDURE — 95117 IMMUNOTHERAPY INJECTIONS: CPT | Performed by: FAMILY MEDICINE

## 2022-08-25 ENCOUNTER — CLINICAL SUPPORT (OUTPATIENT)
Dept: FAMILY MEDICINE CLINIC | Facility: CLINIC | Age: 72
End: 2022-08-25

## 2022-08-25 DIAGNOSIS — J30.1 NON-SEASONAL ALLERGIC RHINITIS DUE TO POLLEN: Primary | ICD-10-CM

## 2022-08-25 PROCEDURE — 95117 IMMUNOTHERAPY INJECTIONS: CPT | Performed by: FAMILY MEDICINE

## 2022-08-31 ENCOUNTER — CLINICAL SUPPORT (OUTPATIENT)
Dept: FAMILY MEDICINE CLINIC | Facility: CLINIC | Age: 72
End: 2022-08-31

## 2022-08-31 DIAGNOSIS — J30.1 NON-SEASONAL ALLERGIC RHINITIS DUE TO POLLEN: Primary | ICD-10-CM

## 2022-08-31 PROCEDURE — 95117 IMMUNOTHERAPY INJECTIONS: CPT | Performed by: FAMILY MEDICINE

## 2022-09-08 ENCOUNTER — CLINICAL SUPPORT (OUTPATIENT)
Dept: FAMILY MEDICINE CLINIC | Facility: CLINIC | Age: 72
End: 2022-09-08

## 2022-09-08 DIAGNOSIS — J30.1 NON-SEASONAL ALLERGIC RHINITIS DUE TO POLLEN: Primary | ICD-10-CM

## 2022-09-08 PROCEDURE — 95117 IMMUNOTHERAPY INJECTIONS: CPT | Performed by: FAMILY MEDICINE

## 2022-09-09 DIAGNOSIS — I10 ESSENTIAL HYPERTENSION: ICD-10-CM

## 2022-09-12 RX ORDER — AMLODIPINE BESYLATE 10 MG/1
TABLET ORAL
Qty: 90 TABLET | Refills: 2 | Status: SHIPPED | OUTPATIENT
Start: 2022-09-12

## 2022-09-14 ENCOUNTER — CLINICAL SUPPORT (OUTPATIENT)
Dept: FAMILY MEDICINE CLINIC | Facility: CLINIC | Age: 72
End: 2022-09-14

## 2022-09-14 DIAGNOSIS — J30.1 NON-SEASONAL ALLERGIC RHINITIS DUE TO POLLEN: Primary | ICD-10-CM

## 2022-09-14 PROCEDURE — 95117 IMMUNOTHERAPY INJECTIONS: CPT | Performed by: FAMILY MEDICINE

## 2022-09-21 ENCOUNTER — CLINICAL SUPPORT (OUTPATIENT)
Dept: FAMILY MEDICINE CLINIC | Facility: CLINIC | Age: 72
End: 2022-09-21

## 2022-09-21 DIAGNOSIS — J30.1 NON-SEASONAL ALLERGIC RHINITIS DUE TO POLLEN: Primary | ICD-10-CM

## 2022-09-21 PROCEDURE — 95117 IMMUNOTHERAPY INJECTIONS: CPT | Performed by: FAMILY MEDICINE

## 2022-09-28 ENCOUNTER — CLINICAL SUPPORT (OUTPATIENT)
Dept: FAMILY MEDICINE CLINIC | Facility: CLINIC | Age: 72
End: 2022-09-28

## 2022-09-28 DIAGNOSIS — J30.1 NON-SEASONAL ALLERGIC RHINITIS DUE TO POLLEN: Primary | ICD-10-CM

## 2022-09-28 PROCEDURE — 95117 IMMUNOTHERAPY INJECTIONS: CPT | Performed by: FAMILY MEDICINE

## 2022-10-05 ENCOUNTER — OFFICE VISIT (OUTPATIENT)
Dept: FAMILY MEDICINE CLINIC | Facility: CLINIC | Age: 72
End: 2022-10-05

## 2022-10-05 VITALS
HEIGHT: 69 IN | HEART RATE: 62 BPM | WEIGHT: 214.4 LBS | OXYGEN SATURATION: 97 % | DIASTOLIC BLOOD PRESSURE: 80 MMHG | RESPIRATION RATE: 18 BRPM | SYSTOLIC BLOOD PRESSURE: 130 MMHG | TEMPERATURE: 97.5 F | BODY MASS INDEX: 31.76 KG/M2

## 2022-10-05 DIAGNOSIS — Z12.5 SCREENING FOR PROSTATE CANCER: ICD-10-CM

## 2022-10-05 DIAGNOSIS — M79.641 PAIN IN BOTH HANDS: ICD-10-CM

## 2022-10-05 DIAGNOSIS — J30.1 NON-SEASONAL ALLERGIC RHINITIS DUE TO POLLEN: ICD-10-CM

## 2022-10-05 DIAGNOSIS — I10 ESSENTIAL HYPERTENSION: ICD-10-CM

## 2022-10-05 DIAGNOSIS — E66.09 CLASS 1 OBESITY DUE TO EXCESS CALORIES WITH SERIOUS COMORBIDITY AND BODY MASS INDEX (BMI) OF 31.0 TO 31.9 IN ADULT: ICD-10-CM

## 2022-10-05 DIAGNOSIS — Z87.891 HISTORY OF TOBACCO USE: ICD-10-CM

## 2022-10-05 DIAGNOSIS — E11.9 TYPE 2 DIABETES MELLITUS WITHOUT COMPLICATION, WITH LONG-TERM CURRENT USE OF INSULIN: Primary | ICD-10-CM

## 2022-10-05 DIAGNOSIS — M25.50 ARTHRALGIA, UNSPECIFIED JOINT: ICD-10-CM

## 2022-10-05 DIAGNOSIS — Z79.4 TYPE 2 DIABETES MELLITUS WITHOUT COMPLICATION, WITH LONG-TERM CURRENT USE OF INSULIN: Primary | ICD-10-CM

## 2022-10-05 DIAGNOSIS — E78.2 MIXED HYPERLIPIDEMIA: ICD-10-CM

## 2022-10-05 DIAGNOSIS — M79.642 PAIN IN BOTH HANDS: ICD-10-CM

## 2022-10-05 LAB
EXPIRATION DATE: NORMAL
HBA1C MFR BLD: 5.7 %
Lab: NORMAL

## 2022-10-05 PROCEDURE — 95117 IMMUNOTHERAPY INJECTIONS: CPT | Performed by: FAMILY MEDICINE

## 2022-10-05 PROCEDURE — 36415 COLL VENOUS BLD VENIPUNCTURE: CPT | Performed by: FAMILY MEDICINE

## 2022-10-05 PROCEDURE — 3044F HG A1C LEVEL LT 7.0%: CPT | Performed by: FAMILY MEDICINE

## 2022-10-05 PROCEDURE — 99214 OFFICE O/P EST MOD 30 MIN: CPT | Performed by: FAMILY MEDICINE

## 2022-10-05 PROCEDURE — 83036 HEMOGLOBIN GLYCOSYLATED A1C: CPT | Performed by: FAMILY MEDICINE

## 2022-10-05 NOTE — PROGRESS NOTES
Chief Complaint  Hand Pain, Diabetes, Hypertension, and Hyperlipidemia    Subjective     CC  Problem List  Visit Diagnosis   Encounters  Notes  Medications  Labs  Result Review Imaging  Media    Herson Ruyb presents to [facility] for   Hand Pain   The incident occurred more than 1 week ago (2 weeks). There was no injury mechanism. The pain is present in the left hand, right hand, left wrist and right wrist (left > right). The quality of the pain is described as aching. The pain is moderate. Associated symptoms include muscle weakness. Pertinent negatives include no chest pain, numbness or tingling. The symptoms are aggravated by lifting (grasping). He has tried NSAIDs for the symptoms. The treatment provided no relief.   Diabetes  He presents for his follow-up diabetic visit. He has type 2 diabetes mellitus. His disease course has been improving. Pertinent negatives for hypoglycemia include no mood changes. Pertinent negatives for diabetes include no chest pain, no foot paresthesias, no polydipsia, no polyuria, no visual change, no weakness and no weight loss. There are no hypoglycemic complications. Symptoms are stable. Pertinent negatives for diabetic complications include no CVA, PVD or retinopathy. He is compliant with treatment most of the time. He participates in exercise daily. There is no change in his home blood glucose trend. Eye exam is current.   Hypertension  This is a chronic problem. The current episode started more than 1 year ago. The problem has been gradually improving since onset. Pertinent negatives include no chest pain, malaise/fatigue, orthopnea, palpitations, peripheral edema, PND or shortness of breath. Current antihypertension treatment includes calcium channel blockers and angiotensin blockers. The current treatment provides moderate improvement. There is no history of angina, kidney disease, CAD/MI, CVA, PVD or retinopathy.  "  Hyperlipidemia  This is a chronic problem. The current episode started more than 1 year ago. The problem is controlled. Recent lipid tests were reviewed and are normal. Exacerbating diseases include diabetes and obesity. Pertinent negatives include no chest pain or shortness of breath. Current antihyperlipidemic treatment includes diet change, exercise and statins. The current treatment provides moderate improvement of lipids. There are no compliance problems.        Review of Systems   Constitutional: Negative for appetite change, fever, malaise/fatigue and unexpected weight loss.   HENT: Positive for congestion and rhinorrhea.    Eyes: Negative for visual disturbance.   Respiratory: Negative for shortness of breath and wheezing.    Cardiovascular: Negative for chest pain, palpitations, orthopnea, leg swelling and PND.   Endocrine: Negative for cold intolerance, heat intolerance, polydipsia and polyuria.   Musculoskeletal: Positive for arthralgias (hand pain bilaterally), arthritis and joint swelling.   Skin: Negative for rash.   Allergic/Immunologic: Positive for environmental allergies.   Neurological: Negative for tingling, weakness and numbness.   Hematological: Negative for adenopathy. Does not bruise/bleed easily.   Psychiatric/Behavioral: Negative for dysphoric mood.        Objective   Vital Signs:   /80 (BP Location: Right arm, Patient Position: Sitting, Cuff Size: Adult)   Pulse 62   Temp 97.5 °F (36.4 °C) (Temporal)   Resp 18   Ht 175.3 cm (69\")   Wt 97.3 kg (214 lb 6.4 oz)   SpO2 97% Comment: Room air  BMI 31.66 kg/m²     Physical Exam  Constitutional:       General: He is not in acute distress.     Appearance: He is obese.   Cardiovascular:      Rate and Rhythm: Normal rate and regular rhythm.      Heart sounds: No murmur heard.  Pulmonary:      Effort: Pulmonary effort is normal.      Breath sounds: Normal breath sounds.   Musculoskeletal:         General: Deformity (distal fingers, the " left hand is mildly swollen and warm the is mild limitation of flexion. The righ had shows FROM aside from the distal IP jts ) present.      Cervical back: Neck supple.      Right lower leg: No edema.      Left lower leg: No edema.   Lymphadenopathy:      Cervical: No cervical adenopathy.        Result Review :Labs  Result Review  Imaging  Med Tab  Media                 Assessment and Plan CC Problem List  Visit Diagnosis  ROS  Review (Popup)  Health Maintenance  Quality  BestPractice  Medications  SmartSets  SnapShot Encounters  Media  Problem List Items Addressed This Visit        High    Type 2 diabetes mellitus without complication, with long-term current use of insulin (HCC) - Primary    Overview     A1c 5.7 10/05/2022 improved.   A1c 6.4 06/29/2022 relatively stable.   A1c 6.0 03/18/2022 controlled. He reports BS of 120  A1c 6.3 12/01/2021  A1c 6.0 09/02/2021  A1c 6.2 06/02/2021  A1c 5.8 02/24/2021   A1c 6.1, 11/18/2020  A1c 5.9,  08/2020  Controlled.          Current Assessment & Plan     Diabetes is improving with treatment.   Continue current treatment regimen.  Diabetes will be reassessed in 3 months.         Relevant Orders    POC Glycosylated Hemoglobin (Hb A1C) (Completed)    CBC & Differential    Comprehensive Metabolic Panel    Essential hypertension    Overview     Controlled. Compliant continue meds.          Current Assessment & Plan     Hypertension is improving with treatment.  Continue current treatment regimen.  Dietary sodium restriction.  Weight loss.  Regular aerobic exercise.  Blood pressure will be reassessed in 3 months.            Medium    Mixed hyperlipidemia    Overview     Controlled with Fish oil d/c atorvastatin for cost, simvastatin. Compliant           Current Assessment & Plan     Lipid abnormalities are improving with treatment.  Nutritional counseling was provided. and Pharmacotherapy as ordered.  Lipids will be reassessed in 3 months.         Relevant Orders     Lipid Panel With / Chol / HDL Ratio    TSH       Low    Allergic rhinitis    Overview     Controlled on ImmunoRx given.   He is compliant with meds and immunoRx         Relevant Medications    Allergy Serum Injection (Completed)    Allergy Serum Injection (Completed)    Class 1 obesity due to excess calories with serious comorbidity and body mass index (BMI) of 31.0 to 31.9 in adult    Overview     Healthy diet and regular exercise discussed and  Encouraged          Current Assessment & Plan     Patient's (Body mass index is 31.66 kg/m².) indicates that they are obese (BMI >30) with health conditions that include hypertension, diabetes mellitus and dyslipidemias . Weight is improving with lifestyle modifications. BMI is is above average; BMI management plan is completed. We discussed low calorie, low carb based diet program, portion control and increasing exercise.          History of tobacco use    Overview     2 ppd x 30 yrs  Low dose CT 11/2019 10/2020 negative, repeat ordered.   He continues to abstain.             Unprioritized    Screening for prostate cancer    Overview     Healthy diet and exercise discussed. Fall prevention, and general safety. Previous lab reviewed. We will notify him of today's lab.          Relevant Orders    PSA Screen      Other Visit Diagnoses     Pain in both hands        likely DJD, lab r/o gout and other pathology    Relevant Orders    C-reactive Protein    Nuclear Antigen Antibody, IFA    Rheumatoid Factor    Uric Acid    Arthralgia, unspecified joint              Follow Up Instructions Charge Capture  Follow-up Communications  Return in about 3 months (around 1/5/2023).  Patient was given instructions and counseling regarding his condition or for health maintenance advice. Please see specific information pulled into the AVS if appropriate.   Answers for HPI/ROS submitted by the patient on 10/5/2022  What is the primary reason for your visit?: Other  Please describe your  symptoms.: Pain in hands joints  Have you had these symptoms before?: No  How long have you been having these symptoms?: 1-2 weeks

## 2022-10-05 NOTE — ASSESSMENT & PLAN NOTE
Patient's (Body mass index is 31.66 kg/m².) indicates that they are obese (BMI >30) with health conditions that include hypertension, diabetes mellitus and dyslipidemias . Weight is improving with lifestyle modifications. BMI is is above average; BMI management plan is completed. We discussed low calorie, low carb based diet program, portion control and increasing exercise.

## 2022-10-06 LAB
ALBUMIN SERPL-MCNC: 3.5 G/DL (ref 3.7–4.7)
ALBUMIN/GLOB SERPL: 1.2 {RATIO} (ref 1.2–2.2)
ALP SERPL-CCNC: 65 IU/L (ref 44–121)
ALT SERPL-CCNC: 26 IU/L (ref 0–44)
AST SERPL-CCNC: 28 IU/L (ref 0–40)
BASOPHILS # BLD AUTO: 0.1 X10E3/UL (ref 0–0.2)
BASOPHILS NFR BLD AUTO: 1 %
BILIRUB SERPL-MCNC: 0.4 MG/DL (ref 0–1.2)
BUN SERPL-MCNC: 12 MG/DL (ref 8–27)
BUN/CREAT SERPL: 13 (ref 10–24)
CALCIUM SERPL-MCNC: 9 MG/DL (ref 8.6–10.2)
CHLORIDE SERPL-SCNC: 101 MMOL/L (ref 96–106)
CHOLEST SERPL-MCNC: 159 MG/DL (ref 100–199)
CHOLEST/HDLC SERPL: 2.9 RATIO (ref 0–5)
CO2 SERPL-SCNC: 22 MMOL/L (ref 20–29)
CREAT SERPL-MCNC: 0.91 MG/DL (ref 0.76–1.27)
CRP SERPL-MCNC: 4 MG/L (ref 0–10)
EGFRCR SERPLBLD CKD-EPI 2021: 90 ML/MIN/1.73
EOSINOPHIL # BLD AUTO: 0.2 X10E3/UL (ref 0–0.4)
EOSINOPHIL NFR BLD AUTO: 2 %
ERYTHROCYTE [DISTWIDTH] IN BLOOD BY AUTOMATED COUNT: 15.2 % (ref 11.6–15.4)
GLOBULIN SER CALC-MCNC: 2.9 G/DL (ref 1.5–4.5)
GLUCOSE SERPL-MCNC: 95 MG/DL (ref 70–99)
HCT VFR BLD AUTO: 45.6 % (ref 37.5–51)
HDLC SERPL-MCNC: 54 MG/DL
HGB BLD-MCNC: 15 G/DL (ref 13–17.7)
IMM GRANULOCYTES # BLD AUTO: 0 X10E3/UL (ref 0–0.1)
IMM GRANULOCYTES NFR BLD AUTO: 0 %
LDLC SERPL CALC-MCNC: 81 MG/DL (ref 0–99)
LYMPHOCYTES # BLD AUTO: 1.4 X10E3/UL (ref 0.7–3.1)
LYMPHOCYTES NFR BLD AUTO: 21 %
MCH RBC QN AUTO: 28.2 PG (ref 26.6–33)
MCHC RBC AUTO-ENTMCNC: 32.9 G/DL (ref 31.5–35.7)
MCV RBC AUTO: 86 FL (ref 79–97)
MONOCYTES # BLD AUTO: 0.5 X10E3/UL (ref 0.1–0.9)
MONOCYTES NFR BLD AUTO: 8 %
NEUTROPHILS # BLD AUTO: 4.5 X10E3/UL (ref 1.4–7)
NEUTROPHILS NFR BLD AUTO: 68 %
PLATELET # BLD AUTO: 257 X10E3/UL (ref 150–450)
POTASSIUM SERPL-SCNC: 4 MMOL/L (ref 3.5–5.2)
PROT SERPL-MCNC: 6.4 G/DL (ref 6–8.5)
PSA SERPL-MCNC: 1.2 NG/ML (ref 0–4)
RBC # BLD AUTO: 5.31 X10E6/UL (ref 4.14–5.8)
RHEUMATOID FACT SERPL-ACNC: <10 IU/ML
SODIUM SERPL-SCNC: 140 MMOL/L (ref 134–144)
TRIGL SERPL-MCNC: 135 MG/DL (ref 0–149)
TSH SERPL DL<=0.005 MIU/L-ACNC: 1.39 UIU/ML (ref 0.45–4.5)
URATE SERPL-MCNC: 6.6 MG/DL (ref 3.8–8.4)
VLDLC SERPL CALC-MCNC: 24 MG/DL (ref 5–40)
WBC # BLD AUTO: 6.6 X10E3/UL (ref 3.4–10.8)

## 2022-10-08 LAB
ANA SER QL IF: POSITIVE
ANA SPECKLED TITR SER: NORMAL {TITER}
Lab: NORMAL

## 2022-10-10 NOTE — PROGRESS NOTES
*Ishan, your CBC shows no anemia,and white count is normal. Glucose, kidneys, electrolytes,liver and thyroid are normal. Cholesterol 159, triglycerides 135, HDL 54, LDL 81, cardiac ratio 2.9. All arthritis labs are normal except LINA  was elevated. Once your hand is feeling better with in the next 6 month we should repeat LINA only. Continue all medication as order, with diet, exercise and weight control.We will repeat labs in one year.

## 2022-10-12 ENCOUNTER — CLINICAL SUPPORT (OUTPATIENT)
Dept: FAMILY MEDICINE CLINIC | Facility: CLINIC | Age: 72
End: 2022-10-12

## 2022-10-12 DIAGNOSIS — J30.1 NON-SEASONAL ALLERGIC RHINITIS DUE TO POLLEN: Primary | ICD-10-CM

## 2022-10-12 PROCEDURE — 95117 IMMUNOTHERAPY INJECTIONS: CPT | Performed by: FAMILY MEDICINE

## 2022-10-20 ENCOUNTER — CLINICAL SUPPORT (OUTPATIENT)
Dept: FAMILY MEDICINE CLINIC | Facility: CLINIC | Age: 72
End: 2022-10-20

## 2022-10-20 DIAGNOSIS — M51.9 LUMBAR DISC DISEASE: ICD-10-CM

## 2022-10-20 DIAGNOSIS — J30.1 NON-SEASONAL ALLERGIC RHINITIS DUE TO POLLEN: Primary | ICD-10-CM

## 2022-10-20 PROCEDURE — 95117 IMMUNOTHERAPY INJECTIONS: CPT | Performed by: FAMILY MEDICINE

## 2022-10-21 RX ORDER — MELOXICAM 15 MG/1
TABLET ORAL
Qty: 30 TABLET | Refills: 12 | Status: SHIPPED | OUTPATIENT
Start: 2022-10-21 | End: 2022-10-26 | Stop reason: SDUPTHER

## 2022-10-24 PROBLEM — Z00.00 MEDICARE ANNUAL WELLNESS VISIT, SUBSEQUENT: Status: ACTIVE | Noted: 2022-10-24

## 2022-10-26 ENCOUNTER — OFFICE VISIT (OUTPATIENT)
Dept: FAMILY MEDICINE CLINIC | Facility: CLINIC | Age: 72
End: 2022-10-26

## 2022-10-26 VITALS
SYSTOLIC BLOOD PRESSURE: 124 MMHG | TEMPERATURE: 97.7 F | RESPIRATION RATE: 16 BRPM | WEIGHT: 218 LBS | DIASTOLIC BLOOD PRESSURE: 68 MMHG | HEIGHT: 69 IN | BODY MASS INDEX: 32.29 KG/M2 | HEART RATE: 72 BPM | OXYGEN SATURATION: 97 %

## 2022-10-26 DIAGNOSIS — E78.2 MIXED HYPERLIPIDEMIA: ICD-10-CM

## 2022-10-26 DIAGNOSIS — I10 ESSENTIAL HYPERTENSION: ICD-10-CM

## 2022-10-26 DIAGNOSIS — Z79.4 TYPE 2 DIABETES MELLITUS WITHOUT COMPLICATION, WITH LONG-TERM CURRENT USE OF INSULIN: ICD-10-CM

## 2022-10-26 DIAGNOSIS — E66.09 CLASS 1 OBESITY DUE TO EXCESS CALORIES WITH SERIOUS COMORBIDITY AND BODY MASS INDEX (BMI) OF 31.0 TO 31.9 IN ADULT: ICD-10-CM

## 2022-10-26 DIAGNOSIS — Z87.891 HISTORY OF TOBACCO USE: ICD-10-CM

## 2022-10-26 DIAGNOSIS — Z12.5 SCREENING FOR PROSTATE CANCER: ICD-10-CM

## 2022-10-26 DIAGNOSIS — N13.8 BENIGN PROSTATIC HYPERPLASIA WITH URINARY OBSTRUCTION: ICD-10-CM

## 2022-10-26 DIAGNOSIS — Z00.00 MEDICARE ANNUAL WELLNESS VISIT, SUBSEQUENT: Primary | ICD-10-CM

## 2022-10-26 DIAGNOSIS — J30.1 NON-SEASONAL ALLERGIC RHINITIS DUE TO POLLEN: ICD-10-CM

## 2022-10-26 DIAGNOSIS — E11.9 TYPE 2 DIABETES MELLITUS WITHOUT COMPLICATION, WITH LONG-TERM CURRENT USE OF INSULIN: ICD-10-CM

## 2022-10-26 DIAGNOSIS — M51.9 LUMBAR DISC DISEASE: ICD-10-CM

## 2022-10-26 DIAGNOSIS — Z12.11 SCREENING FOR COLON CANCER: ICD-10-CM

## 2022-10-26 DIAGNOSIS — N40.1 BENIGN PROSTATIC HYPERPLASIA WITH URINARY OBSTRUCTION: ICD-10-CM

## 2022-10-26 LAB
BILIRUB BLD-MCNC: NEGATIVE MG/DL
CLARITY, POC: CLEAR
COLOR UR: YELLOW
GLUCOSE UR STRIP-MCNC: NEGATIVE MG/DL
KETONES UR QL: NEGATIVE
LEUKOCYTE EST, POC: NEGATIVE
NITRITE UR-MCNC: NEGATIVE MG/ML
PH UR: 7 [PH] (ref 5–8)
PROT UR STRIP-MCNC: NEGATIVE MG/DL
RBC # UR STRIP: NEGATIVE /UL
SP GR UR: 1.01 (ref 1–1.03)
UROBILINOGEN UR QL: NORMAL

## 2022-10-26 PROCEDURE — 1126F AMNT PAIN NOTED NONE PRSNT: CPT | Performed by: FAMILY MEDICINE

## 2022-10-26 PROCEDURE — 1170F FXNL STATUS ASSESSED: CPT | Performed by: FAMILY MEDICINE

## 2022-10-26 PROCEDURE — 1159F MED LIST DOCD IN RCRD: CPT | Performed by: FAMILY MEDICINE

## 2022-10-26 PROCEDURE — 81002 URINALYSIS NONAUTO W/O SCOPE: CPT | Performed by: FAMILY MEDICINE

## 2022-10-26 PROCEDURE — G0444 DEPRESSION SCREEN ANNUAL: HCPCS | Performed by: FAMILY MEDICINE

## 2022-10-26 PROCEDURE — 1158F ADVNC CARE PLAN TLK DOCD: CPT | Performed by: FAMILY MEDICINE

## 2022-10-26 PROCEDURE — 1160F RVW MEDS BY RX/DR IN RCRD: CPT | Performed by: FAMILY MEDICINE

## 2022-10-26 PROCEDURE — G0439 PPPS, SUBSEQ VISIT: HCPCS | Performed by: FAMILY MEDICINE

## 2022-10-26 PROCEDURE — 95117 IMMUNOTHERAPY INJECTIONS: CPT | Performed by: FAMILY MEDICINE

## 2022-10-26 RX ORDER — MELOXICAM 15 MG/1
15 TABLET ORAL DAILY
Qty: 90 TABLET | Refills: 3 | Status: SHIPPED | OUTPATIENT
Start: 2022-10-26

## 2022-10-26 NOTE — PROGRESS NOTES
Chief Complaint  Diabetes, Hypertension, and Hyperlipidemia    Subjective     CC  Problem List  Visit Diagnosis   Encounters  Notes  Medications  Labs  Result Review Imaging  Media    Herson Ruby presents to [facility] for   Diabetes  He presents for his follow-up diabetic visit. He has type 2 diabetes mellitus. No MedicAlert identification noted. His disease course has been stable. Pertinent negatives for hypoglycemia include no confusion, dizziness, headaches or nervousness/anxiousness. Pertinent negatives for diabetes include no blurred vision, no chest pain, no fatigue, no foot paresthesias, no foot ulcerations, no polydipsia, no polyphagia, no polyuria, no visual change, no weakness and no weight loss. There are no hypoglycemic complications. Symptoms are stable. Risk factors for coronary artery disease include diabetes mellitus, dyslipidemia, hypertension, male sex, obesity and family history. Current diabetic treatment includes oral agent (monotherapy) and diet. He is compliant with treatment all of the time. His weight is stable. He is following a generally healthy diet. He has not had a previous visit with a dietitian. He participates in exercise daily. There is no change in his home blood glucose trend. His breakfast blood glucose is taken between 6-7 am. His breakfast blood glucose range is generally 110-130 mg/dl. (Home blood sugar 119) An ACE inhibitor/angiotensin II receptor blocker is not being taken. He does not see a podiatrist.Eye exam is current (2 weeks ago patient was seen at [facility] in [place] ).   Hypertension  This is a chronic problem. The current episode started more than 1 year ago. The problem is unchanged. The problem is controlled. Pertinent negatives include no blurred vision, chest pain, headaches, orthopnea, palpitations or shortness of breath. Risk factors for coronary artery disease include dyslipidemia, diabetes mellitus, male  gender, obesity and family history. Current antihypertension treatment includes calcium channel blockers and angiotensin blockers. The current treatment provides mild improvement. There are no compliance problems.    Hyperlipidemia  This is a chronic problem. The current episode started more than 1 year ago. The problem is controlled. Recent lipid tests were reviewed and are normal. Exacerbating diseases include diabetes and obesity. Pertinent negatives include no chest pain, myalgias or shortness of breath. Current antihyperlipidemic treatment includes statins and herbal therapy. The current treatment provides mild improvement of lipids. There are no compliance problems.  Risk factors for coronary artery disease include dyslipidemia, diabetes mellitus, hypertension, male sex, family history and obesity.       Review of Systems   Constitutional: Negative for activity change, fatigue, fever, unexpected weight gain and unexpected weight loss.   HENT: Negative for congestion, ear pain, hearing loss, rhinorrhea, sinus pressure, sore throat, swollen glands, trouble swallowing and voice change.    Eyes: Negative for blurred vision and visual disturbance.   Respiratory: Negative for apnea, shortness of breath and wheezing.    Cardiovascular: Negative for chest pain, palpitations, orthopnea and leg swelling.   Gastrointestinal: Negative for abdominal pain, blood in stool, constipation, diarrhea, nausea, vomiting and indigestion.   Endocrine: Negative for cold intolerance, heat intolerance, polydipsia, polyphagia and polyuria.   Genitourinary: Negative for dysuria, flank pain, frequency, hematuria, testicular pain, urgency and urinary incontinence.   Musculoskeletal: Positive for arthralgias (hands, multiple,  improved with rare meloxicam ). Negative for joint swelling and myalgias.   Skin: Negative for rash, skin lesions and wound.   Allergic/Immunologic: Negative for environmental allergies and immunocompromised state.  "  Neurological: Negative for dizziness, syncope, weakness, numbness, headache, memory problem and confusion.   Hematological: Negative for adenopathy. Does not bruise/bleed easily.   Psychiatric/Behavioral: Negative for suicidal ideas and depressed mood. The patient is not nervous/anxious.         Objective   Vital Signs:   /68 (BP Location: Right arm, Patient Position: Sitting, Cuff Size: Adult)   Pulse 72   Temp 97.7 °F (36.5 °C)   Resp 16   Ht 175.3 cm (69\")   Wt 98.9 kg (218 lb)   SpO2 97% Comment: room air  BMI 32.19 kg/m²     Physical Exam  Constitutional:       General: He is not in acute distress.     Appearance: Normal appearance. He is well-developed.   HENT:      Head: Normocephalic.      Right Ear: Tympanic membrane and external ear normal.      Left Ear: Tympanic membrane and external ear normal.      Nose: No mucosal edema.      Mouth/Throat:      Mouth: No oral lesions.      Pharynx: No oropharyngeal exudate.   Eyes:      General: No scleral icterus.     Conjunctiva/sclera: Conjunctivae normal.      Pupils: Pupils are equal, round, and reactive to light.   Neck:      Thyroid: No thyromegaly.      Vascular: No JVD.   Cardiovascular:      Rate and Rhythm: Normal rate and regular rhythm.  No extrasystoles are present.     Pulses:           Carotid pulses are 2+ on the right side and 2+ on the left side.       Femoral pulses are 2+ on the right side and 2+ on the left side.       Dorsalis pedis pulses are 2+ on the right side and 2+ on the left side.      Heart sounds: Normal heart sounds. No murmur heard.    No friction rub. No gallop.   Pulmonary:      Effort: Pulmonary effort is normal.      Breath sounds: Normal breath sounds. No decreased breath sounds.   Abdominal:      General: Bowel sounds are normal. There is no distension.      Palpations: Abdomen is soft. There is no mass.      Tenderness: There is no abdominal tenderness.      Hernia: No hernia is present. There is no hernia in " the left inguinal area.   Genitourinary:     Testes: Normal.   Musculoskeletal:         General: Normal range of motion.      Cervical back: Normal range of motion and neck supple. No edema.   Lymphadenopathy:      Head:      Right side of head: No submandibular adenopathy.      Left side of head: No submandibular adenopathy.      Cervical: No cervical adenopathy.      Upper Body:      Right upper body: No supraclavicular adenopathy.      Left upper body: No supraclavicular adenopathy.   Skin:     General: Skin is warm and dry.      Findings: No ecchymosis, erythema or rash.   Neurological:      Mental Status: He is alert and oriented to person, place, and time. He is not disoriented.      Cranial Nerves: No cranial nerve deficit.      Sensory: No sensory deficit.      Motor: No weakness or abnormal muscle tone.      Coordination: Coordination normal.      Deep Tendon Reflexes: Reflexes are normal and symmetric. Reflexes normal.   Psychiatric:         Speech: Speech normal.         Thought Content: Thought content normal.         Judgment: Judgment normal.        Result Review :Labs  Result Review  Imaging  Med Tab  Media                 Assessment and Plan CC Problem List  Visit Diagnosis  ROS  Review (Popup)  Health Maintenance  Quality  BestPractice  Medications  SmartSets  SnapShot Encounters  Media  Problem List Items Addressed This Visit        High    Type 2 diabetes mellitus without complication, with long-term current use of insulin ([facility])    Overview     A1c 5.7 10/05/2022 improved. He continues a good diet reg exercise BS at home 110  A1c 6.4 06/29/2022  A1c 6.0 03/18/2022 controlled.   A1c 6.3 12/01/2021  A1c 6.0 09/02/2021  A1c 6.2 06/02/2021  A1c 5.8 02/24/2021   A1c 6.1, 11/18/2020  A1c 5.9,  08/2020  Controlled.          Current Assessment & Plan     Diabetes is improving with treatment.   Continue current treatment regimen.  Regular aerobic exercise.  Diabetes will be reassessed in 3  months.         Relevant Orders    Microalbumin / Creatinine Urine Ratio - Urine, Clean Catch (Completed)    POCT urinalysis dipstick, manual (Completed)    Essential hypertension    Overview     Controlled. Compliant continue meds.          Current Assessment & Plan     Hypertension is improving with treatment.  Continue current treatment regimen.  Regular aerobic exercise.  Blood pressure will be reassessed in 3 months.            Medium    Mixed hyperlipidemia    Overview     Controlled with Fish oil d/c atorvastatin for cost, simvastatin. Compliant           Current Assessment & Plan     Lipid abnormalities are improving with treatment.  Pharmacotherapy as ordered.  Lipids will be reassessed in 3 months.            Low    Allergic rhinitis    Overview     Controlled on ImmunoRx given.   He is compliant with meds and immunoRx.         Relevant Medications    meloxicam (MOBIC) 15 MG tablet    Class 1 obesity due to excess calories with serious comorbidity and body mass index (BMI) of 31.0 to 31.9 in adult    Overview     Healthy diet and regular exercise discussed and  Encouraged          Current Assessment & Plan     Patient's (Body mass index is 32.19 kg/m².) indicates that they are obese (BMI >30) with health conditions that include hypertension, diabetes mellitus and dyslipidemias . Weight is unchanged. BMI is is above average; BMI management plan is completed. We discussed low calorie, low carb based diet program, portion control and increasing exercise.          History of tobacco use    Overview     2 ppd x 30 yrs  Low dose CT 11/2019 10/2020 negative, repeat ordered.   He continues to abstain.             Unprioritized    Screening for colon cancer    Overview     Colonoscopy 2022, repeat in 7 years         Benign prostatic hyperplasia with urinary obstruction    Overview     Followed with Ave. On meds compliant, sxs controlled.          Screening for prostate cancer    Overview     Lab Results    Component Value Date    PSA 1.2 10/05/2022    PSA 2.2 09/01/2021    PSA 1.9 08/06/2020              Lumbar disc disease    Overview     anterolisthesis L5-S1 with severe arthropathy, on xray 10.14.2020  Anti inflammatories consider PT if no improvement.          Relevant Medications    meloxicam (MOBIC) 15 MG tablet    Medicare annual wellness visit, subsequent - Primary    Overview     Healthy diet and regular exercise. Seat belts, fall prevention and general safety discussed. Previous lab reviewed in detail.             Follow Up Instructions Charge Capture  Follow-up Communications  Return in about 3 months (around 1/26/2023).  Patient was given instructions and counseling regarding his condition or for health maintenance advice. Please see specific information pulled into the AVS if appropriate.

## 2022-10-26 NOTE — PROGRESS NOTES
The ABCs of the Annual Wellness Visit  Subsequent Medicare Wellness Visit    Chief Complaint:  Medicare Wellness  Subjective      Herson Ruby is a 71 y.o. male who presents for a Subsequent Medicare Wellness Visit. The patient is here: for coordination of medical care to discuss health maintenance and disease prevention to follow up on multiple medical problems. Overall has: moderate activity with work/home activities, exercises 2 - 3 times per week, good appetite, feels well with minor complaints, decreased energy level and is sleeping poorly. Nutrition: appropriate balanced diet, balanced diet and eating a variety of foods. Last tetanus shot was unknown.    The following portions of the patient's history were reviewed and   updated as appropriate: allergies, current medications, past family history, past medical history, past social history, past surgical history and problem list.     Compared to one year ago, the patient feels his physical   health is the same.    Compared to one year ago, the patient feels his mental   health is better.    Recent Hospitalizations:  He was not admitted to the hospital during the last year.       Current Medical Providers:  Patient Care Team:  Nunu John MD as PCP - General (Family Medicine)  Chalino Ballard Sr., MD as Consulting Physician (Family Medicine)  Clinton Dorado MD as Consulting Physician (Urology)  Clinton Hugo MD as Consulting Physician (Allergy and Immunology)    Outpatient Medications Prior to Visit   Medication Sig Dispense Refill   • albuterol sulfate  (90 Base) MCG/ACT inhaler Inhale 2 puffs Every 4 (Four) Hours As Needed for Wheezing.     • amLODIPine (NORVASC) 10 MG tablet TAKE 1 TABLET BY MOUTH EVERY DAY FOR BLOOD PRESSURE 90 tablet 2   • cyclobenzaprine (FLEXERIL) 10 MG tablet TAKE 1 TABLET BY MOUTH DAILY AS NEEDED FOR MUSCLE SPASMS 30 tablet 2   • finasteride (PROSCAR) 5 MG tablet Take 1 tablet by mouth Daily. 90 tablet 3   •  gabapentin (NEURONTIN) 300 MG capsule Take 1 capsule by mouth 2 (Two) Times a Day. 60 capsule 2   • Glucose Blood (Blood Glucose Test) strip 1 strip by In Vitro route Daily. 30 each 12   • losartan (COZAAR) 50 MG tablet TAKE 1 TABLET BY MOUTH EVERY DAY 90 tablet 4   • meloxicam (MOBIC) 15 MG tablet TAKE 1 TABLET BY MOUTH EVERY DAY 30 tablet 12   • metFORMIN (GLUCOPHAGE) 500 MG tablet TAKE 1 TABLET BY MOUTH TWICE A  tablet 3   • Multiple Vitamins-Minerals (CENTRUM SILVER 50+MEN PO) Take  by mouth.     • Omega-3 Fatty Acids (FISH OIL) 1000 MG capsule capsule Take 1,000 mg by mouth Daily With Breakfast.     • OneTouch Delica Lancets 33G misc 1 each Daily. 30 each 12   • simvastatin (ZOCOR) 80 MG tablet TAKE 1 TABLET BY MOUTH EVERY DAY AT NIGHT 90 tablet 3   • tamsulosin (FLOMAX) 0.4 MG capsule 24 hr capsule Take 1 capsule by mouth Daily.     • vitamin C (ASCORBIC ACID) 500 MG tablet Take 500 mg by mouth Daily.       Facility-Administered Medications Prior to Visit   Medication Dose Route Frequency Provider Last Rate Last Admin   • Allergy Serum Injection  0.35 mL Subcutaneous Once Chalino Ballard Sr., MD       • Allergy Serum Injection  0.35 mL Subcutaneous Once Chalino Ballard Sr., MD           No opioid medication identified on active medication list. I have reviewed chart for other potential  high risk medication/s and harmful drug interactions in the elderly.          Aspirin is not on active medication list.  Aspirin use is not indicated based on review of current medical condition/s. Risk of harm outweighs potential benefits.  .    Family History   Problem Relation Age of Onset   • Lung cancer Mother    • Diabetes Father    • Heart disease Father    • Lung cancer Father    • Lung cancer Brother 57   • Heart disease Brother         MI at age 51 and  at 51   • Alcohol abuse Brother    • Lung cancer Sister    • Cancer Sister         Unknown   • Heart disease Sister    • Multiple sclerosis Sister           at 37   • Kidney disease Sister         low functioning   • Liver disease Sister         low functioning   • Lung cancer Sister        Advance Care Planning   Advance Directive is on file.  ACP discussion was held with the patient during this visit. Patient has an advance directive in EMR which is still valid.     A face-to-face visit was completed today with patient.  Counseling explanation, and discussion of advanced directives was performed.   The last advanced care visit was performed in .  In a near life ending situation, from which he is not expected to recover functionally, and he is not able to speak for himself, he does not want life sustaining measures. We discussed feeding tubes, ventilators and cardiac support as well as dialysis.    I spent more than 16 minutes discussing Advanced Care Planning information and documenting in the chart.      I have reviewed and confirmed the accuracy of the HPI and ROS as documented by the MA/LPN/RN Kayla Rahman MA    Reviewed chart for potential of high risk medication in the elderly: yes  Reviewed chart for potential of harmful drug interactions in the elderly:yes       Objective     There were no vitals filed for this visit.  BMI Readings from Last 1 Encounters:   10/05/22 31.66 kg/m²   BMI is above normal parameters. Recommendations include: nutrition counseling    Does the patient have evidence of cognitive impairment? No    Lab Results   Component Value Date    CHLPL 159 10/05/2022    TRIG 135 10/05/2022    HDL 54 10/05/2022    LDL 81 10/05/2022    VLDL 24 10/05/2022    HGBA1C 5.7 10/05/2022            HEALTH RISK ASSESSMENT    Smoking Status:  Social History     Tobacco Use   Smoking Status Former   • Packs/day: 2.00   • Years: 30.00   • Pack years: 60.00   • Types: Cigarettes   • Start date:    • Quit date:    • Years since quittin.8   Smokeless Tobacco Never     Alcohol Consumption:  Social History     Substance and Sexual Activity    Alcohol Use Yes     Fall Risk Screen:    KEZIA Fall Risk Assessment was completed, and patient is at LOW risk for falls.Assessment completed on:3/18/2022    Depression Screening:  PHQ-2/PHQ-9 Depression Screening 3/18/2022   Retired PHQ-9 Total Score -   Retired Total Score -   Little Interest or Pleasure in Doing Things 0-->not at all   Feeling Down, Depressed or Hopeless 0-->not at all   PHQ-9: Brief Depression Severity Measure Score 0       Health Habits and Functional and Cognitive Screening:  Functional & Cognitive Status 9/1/2021   Do you have difficulty preparing food and eating? No   Do you have difficulty bathing yourself, getting dressed or grooming yourself? No   Do you have difficulty using the toilet? No   Do you have difficulty moving around from place to place? No   Do you have trouble with steps or getting out of a bed or a chair? No   Current Diet Well Balanced Diet   Dental Exam Not up to date   Eye Exam Up to date        Eye Exam [facility] 2020 [facility] in [place], [place]   Exercise (times per week) 5 times per week   Current Exercises Include Walking;Weightlifting   Current Exercise Activities Include -   Do you need help using the phone?  No   Are you deaf or do you have serious difficulty hearing?  No   Do you need help with transportation? No   Do you need help shopping? No   Do you need help preparing meals?  No   Do you need help with housework?  No   Do you need help with laundry? No   Do you need help taking your medications? No   Do you need help managing money? No   Do you ever drive or ride in a car without wearing a seat belt? No   Have you felt unusual stress, anger or loneliness in the last month? No   Who do you live with? Spouse   If you need help, do you have trouble finding someone available to you? No   Have you been bothered in the last four weeks by sexual problems? No   Do you have difficulty concentrating, remembering or making decisions? No       Age-appropriate  Screening Schedule:  Refer to the list below for future screening recommendations based on patient's age, sex and/or medical conditions. Orders for these recommended tests are listed in the plan section. The patient has been provided with a written plan.    Health Maintenance   Topic Date Due   • TDAP/TD VACCINES (1 - Tdap) Never done   • ZOSTER VACCINE (2 of 3) 02/26/2013   • INFLUENZA VACCINE  08/01/2022   • DIABETIC FOOT EXAM  09/01/2022   • URINE MICROALBUMIN  09/01/2022   • HEMOGLOBIN A1C  04/05/2023   • DIABETIC EYE EXAM  10/03/2023   • LIPID PANEL  10/05/2023              Assessment & Plan     Medically necessary, significant, and separately identifiable medical problems identified during this visit are addressed on a separate visit note.    CMS Preventative Services Quick Reference  Risk Factors Identified During Encounter  Cardiovascular Disease  The above risks/problems have been discussed with the patient.  Follow up actions/plans if indicated are seen below in the Assessment/Plan Section.  Pertinent information has been shared with the patient in the After Visit Summary.    Follow Up:   No follow-ups on file.     An After Visit Summary and PPPS were given to the patient.

## 2022-10-27 LAB
ALBUMIN/CREAT UR: <5 MG/G CREAT (ref 0–29)
CREAT UR-MCNC: 58.6 MG/DL
MICROALBUMIN UR-MCNC: <3 UG/ML

## 2022-10-27 NOTE — PROGRESS NOTES
* Ishan , your diabetic urine test   micro albumin/ creatinine was normal.We will repeat in one year.

## 2022-10-30 NOTE — ASSESSMENT & PLAN NOTE
Patient's (Body mass index is 32.19 kg/m².) indicates that they are obese (BMI >30) with health conditions that include hypertension, diabetes mellitus and dyslipidemias . Weight is unchanged. BMI is is above average; BMI management plan is completed. We discussed low calorie, low carb based diet program, portion control and increasing exercise.

## 2022-11-02 ENCOUNTER — CLINICAL SUPPORT (OUTPATIENT)
Dept: FAMILY MEDICINE CLINIC | Facility: CLINIC | Age: 72
End: 2022-11-02

## 2022-11-02 DIAGNOSIS — J30.1 NON-SEASONAL ALLERGIC RHINITIS DUE TO POLLEN: Primary | ICD-10-CM

## 2022-11-02 PROCEDURE — 95117 IMMUNOTHERAPY INJECTIONS: CPT | Performed by: FAMILY MEDICINE

## 2022-11-16 ENCOUNTER — CLINICAL SUPPORT (OUTPATIENT)
Dept: FAMILY MEDICINE CLINIC | Facility: CLINIC | Age: 72
End: 2022-11-16

## 2022-11-16 DIAGNOSIS — J30.1 NON-SEASONAL ALLERGIC RHINITIS DUE TO POLLEN: Primary | ICD-10-CM

## 2022-11-16 PROCEDURE — 95117 IMMUNOTHERAPY INJECTIONS: CPT | Performed by: FAMILY MEDICINE

## 2022-11-23 ENCOUNTER — CLINICAL SUPPORT (OUTPATIENT)
Dept: FAMILY MEDICINE CLINIC | Facility: CLINIC | Age: 72
End: 2022-11-23

## 2022-11-23 DIAGNOSIS — J30.1 NON-SEASONAL ALLERGIC RHINITIS DUE TO POLLEN: ICD-10-CM

## 2022-11-23 PROCEDURE — 95117 IMMUNOTHERAPY INJECTIONS: CPT | Performed by: FAMILY MEDICINE

## 2022-11-28 DIAGNOSIS — E11.9 TYPE 2 DIABETES MELLITUS WITHOUT COMPLICATION, WITH LONG-TERM CURRENT USE OF INSULIN: ICD-10-CM

## 2022-11-28 DIAGNOSIS — Z79.4 TYPE 2 DIABETES MELLITUS WITHOUT COMPLICATION, WITH LONG-TERM CURRENT USE OF INSULIN: ICD-10-CM

## 2022-11-29 RX ORDER — BLOOD SUGAR DIAGNOSTIC
STRIP MISCELLANEOUS
Qty: 100 EACH | Refills: 12 | Status: SHIPPED | OUTPATIENT
Start: 2022-11-29

## 2022-11-29 RX ORDER — TAMSULOSIN HYDROCHLORIDE 0.4 MG/1
CAPSULE ORAL
Qty: 90 CAPSULE | Refills: 3 | Status: SHIPPED | OUTPATIENT
Start: 2022-11-29

## 2022-11-30 ENCOUNTER — CLINICAL SUPPORT (OUTPATIENT)
Dept: FAMILY MEDICINE CLINIC | Facility: CLINIC | Age: 72
End: 2022-11-30

## 2022-11-30 DIAGNOSIS — J30.1 ALLERGIC RHINITIS DUE TO POLLEN, UNSPECIFIED SEASONALITY: Primary | ICD-10-CM

## 2022-11-30 PROCEDURE — 95117 IMMUNOTHERAPY INJECTIONS: CPT | Performed by: FAMILY MEDICINE

## 2022-12-06 DIAGNOSIS — M51.9 LUMBAR DISC DISEASE: ICD-10-CM

## 2022-12-06 RX ORDER — CYCLOBENZAPRINE HCL 10 MG
10 TABLET ORAL DAILY PRN
Qty: 30 TABLET | Refills: 2 | Status: SHIPPED | OUTPATIENT
Start: 2022-12-06

## 2022-12-07 ENCOUNTER — TELEPHONE (OUTPATIENT)
Dept: FAMILY MEDICINE CLINIC | Facility: CLINIC | Age: 72
End: 2022-12-07

## 2022-12-07 ENCOUNTER — CLINICAL SUPPORT (OUTPATIENT)
Dept: FAMILY MEDICINE CLINIC | Facility: CLINIC | Age: 72
End: 2022-12-07

## 2022-12-07 DIAGNOSIS — J30.1 ALLERGIC RHINITIS DUE TO POLLEN, UNSPECIFIED SEASONALITY: Primary | ICD-10-CM

## 2022-12-07 PROCEDURE — 95117 IMMUNOTHERAPY INJECTIONS: CPT | Performed by: FAMILY MEDICINE

## 2022-12-07 NOTE — TELEPHONE ENCOUNTER
Patient came in today and said that he was getting his allergy serum and he said that this was the last time he would be getting his shots here as he was going to start getting them done at home. His Mar has been completed and the forms will be scanned in

## 2022-12-12 DIAGNOSIS — Z87.891 HISTORY OF TOBACCO USE: Primary | ICD-10-CM

## 2022-12-14 ENCOUNTER — TELEPHONE (OUTPATIENT)
Dept: FAMILY MEDICINE CLINIC | Facility: CLINIC | Age: 72
End: 2022-12-14

## 2022-12-14 NOTE — TELEPHONE ENCOUNTER
Mrs munoz from Dr julissa [place] office is calling to see if they couldn't get Mr benitez's allergy shot records, Call back [phone] fax [phone]

## 2022-12-19 ENCOUNTER — HOSPITAL ENCOUNTER (OUTPATIENT)
Dept: CT IMAGING | Facility: HOSPITAL | Age: 72
Discharge: HOME | End: 2022-12-19
Admitting: FAMILY MEDICINE

## 2022-12-19 DIAGNOSIS — Z87.891 HISTORY OF TOBACCO USE: ICD-10-CM

## 2022-12-19 PROCEDURE — 71271 CT THORAX LUNG CANCER SCR C-: CPT

## 2022-12-29 ENCOUNTER — TRANSCRIBE ORDERS (OUTPATIENT)
Dept: ADMINISTRATIVE | Facility: HOSPITAL | Age: 72
End: 2022-12-29
Payer: MEDICARE

## 2022-12-29 DIAGNOSIS — Z13.6 ENCOUNTER FOR SCREENING FOR VASCULAR DISEASE: Primary | ICD-10-CM

## 2023-01-04 ENCOUNTER — OFFICE VISIT (OUTPATIENT)
Dept: FAMILY MEDICINE CLINIC | Facility: CLINIC | Age: 73
End: 2023-01-04
Payer: MEDICARE

## 2023-01-04 VITALS
DIASTOLIC BLOOD PRESSURE: 66 MMHG | TEMPERATURE: 97.2 F | HEIGHT: 69 IN | WEIGHT: 217.2 LBS | BODY MASS INDEX: 32.17 KG/M2 | RESPIRATION RATE: 18 BRPM | OXYGEN SATURATION: 96 % | HEART RATE: 75 BPM | SYSTOLIC BLOOD PRESSURE: 130 MMHG

## 2023-01-04 DIAGNOSIS — E66.09 CLASS 1 OBESITY DUE TO EXCESS CALORIES WITH SERIOUS COMORBIDITY AND BODY MASS INDEX (BMI) OF 31.0 TO 31.9 IN ADULT: ICD-10-CM

## 2023-01-04 DIAGNOSIS — E78.2 MIXED HYPERLIPIDEMIA: ICD-10-CM

## 2023-01-04 DIAGNOSIS — Z87.891 HISTORY OF TOBACCO USE: ICD-10-CM

## 2023-01-04 DIAGNOSIS — Z79.4 TYPE 2 DIABETES MELLITUS WITHOUT COMPLICATION, WITH LONG-TERM CURRENT USE OF INSULIN: Primary | ICD-10-CM

## 2023-01-04 DIAGNOSIS — E11.9 TYPE 2 DIABETES MELLITUS WITHOUT COMPLICATION, WITH LONG-TERM CURRENT USE OF INSULIN: Primary | ICD-10-CM

## 2023-01-04 DIAGNOSIS — I10 ESSENTIAL HYPERTENSION: ICD-10-CM

## 2023-01-04 LAB
BILIRUB BLD-MCNC: NEGATIVE MG/DL
CLARITY, POC: ABNORMAL
COLOR UR: YELLOW
EXPIRATION DATE: NORMAL
GLUCOSE BLDC GLUCOMTR-MCNC: 123 MG/DL (ref 70–130)
GLUCOSE UR STRIP-MCNC: NEGATIVE MG/DL
HBA1C MFR BLD: 6.1 %
KETONES UR QL: NEGATIVE
LEUKOCYTE EST, POC: NEGATIVE
Lab: NORMAL
NITRITE UR-MCNC: NEGATIVE MG/ML
PH UR: 5 [PH] (ref 5–8)
PROT UR STRIP-MCNC: NEGATIVE MG/DL
RBC # UR STRIP: NEGATIVE /UL
SP GR UR: 1.01 (ref 1–1.03)
UROBILINOGEN UR QL: NORMAL

## 2023-01-04 PROCEDURE — 3044F HG A1C LEVEL LT 7.0%: CPT | Performed by: FAMILY MEDICINE

## 2023-01-04 PROCEDURE — 83036 HEMOGLOBIN GLYCOSYLATED A1C: CPT | Performed by: FAMILY MEDICINE

## 2023-01-04 PROCEDURE — 81002 URINALYSIS NONAUTO W/O SCOPE: CPT | Performed by: FAMILY MEDICINE

## 2023-01-04 PROCEDURE — 82962 GLUCOSE BLOOD TEST: CPT | Performed by: FAMILY MEDICINE

## 2023-01-04 PROCEDURE — 99214 OFFICE O/P EST MOD 30 MIN: CPT | Performed by: FAMILY MEDICINE

## 2023-01-04 RX ORDER — SYRINGE WITH NEEDLE, 1 ML 28GX1/2"
SYRINGE, EMPTY DISPOSABLE MISCELLANEOUS
COMMUNITY
Start: 2022-12-16

## 2023-01-04 RX ORDER — EPINEPHRINE 0.3 MG/.3ML
INJECTION SUBCUTANEOUS
COMMUNITY
Start: 2022-12-03

## 2023-01-04 NOTE — PROGRESS NOTES
Chief Complaint  Diabetes, Hypertension, and Hyperlipidemia    Subjective     CC  Problem List  Visit Diagnosis   Encounters  Notes  Medications  Labs  Result Review Imaging  Media    Herson Ruby presents to [facility] for   Hypertension  This is a chronic problem. The current episode started more than 1 year ago. The problem is controlled. Pertinent negatives include no anxiety, blurred vision, chest pain, headaches, malaise/fatigue, orthopnea, palpitations, peripheral edema, PND, shortness of breath or sweats. Current antihypertension treatment includes calcium channel blockers and angiotensin blockers. The current treatment provides moderate improvement.   Hyperlipidemia  This is a chronic problem. The current episode started more than 1 year ago. The problem is controlled. Pertinent negatives include no chest pain, focal sensory loss, focal weakness, leg pain or shortness of breath. Current antihyperlipidemic treatment includes statins. The current treatment provides moderate improvement of lipids.   Diabetes  He presents for his follow-up diabetic visit. He has type 2 diabetes mellitus. His disease course has been stable. Hypoglycemia symptoms include mood changes. Pertinent negatives for hypoglycemia include no confusion, headaches, hunger, sleepiness or sweats. There are no diabetic associated symptoms. Pertinent negatives for diabetes include no blurred vision, no chest pain, no fatigue, no foot paresthesias, no foot ulcerations, no polydipsia, no polyuria, no visual change, no weakness and no weight loss. There are no hypoglycemic complications. Symptoms are stable. There are no diabetic complications. Risk factors for coronary artery disease include family history. Current diabetic treatment includes oral agent (monotherapy). He is compliant with treatment all of the time. (He states that he is running 120-130's. He states that he has not had any issues with his  "sugar and he states that he has not had any issues with how he is taking his medication. )       Review of Systems   Constitutional: Negative for appetite change, fatigue, fever, malaise/fatigue and unexpected weight loss.   Eyes: Negative for blurred vision and visual disturbance.   Respiratory: Negative for shortness of breath and wheezing.    Cardiovascular: Negative for chest pain, palpitations, orthopnea, leg swelling and PND.   Endocrine: Negative for cold intolerance, heat intolerance, polydipsia and polyuria.   Skin: Negative for rash.   Neurological: Negative for focal weakness, weakness, numbness and confusion.   Hematological: Negative for adenopathy. Does not bruise/bleed easily.   Psychiatric/Behavioral: Negative for dysphoric mood.        Objective   Vital Signs:   /66 (BP Location: Right arm, Patient Position: Sitting, Cuff Size: Adult)   Pulse 75   Temp 97.2 °F (36.2 °C) (Temporal)   Resp 18   Ht 175.3 cm (69\")   Wt 98.5 kg (217 lb 3.2 oz)   SpO2 96%   BMI 32.07 kg/m²     Physical Exam  Constitutional:       General: He is not in acute distress.  Cardiovascular:      Rate and Rhythm: Normal rate and regular rhythm.      Pulses:           Dorsalis pedis pulses are 2+ on the right side and 2+ on the left side.      Heart sounds: Normal heart sounds. No murmur heard.     Comments: Neg Isabel's  Pulmonary:      Effort: Pulmonary effort is normal.      Breath sounds: Normal breath sounds.   Musculoskeletal:      Cervical back: Neck supple.      Left lower leg: No edema.   Feet:      Right foot:      Protective Sensation: 10 sites tested. 10 sites sensed.      Skin integrity: Dry skin present.      Left foot:      Protective Sensation: 10 sites tested. 10 sites sensed.      Skin integrity: Dry skin present.   Lymphadenopathy:      Cervical: No cervical adenopathy.   Skin:     Findings: No rash.   Neurological:      Mental Status: He is alert and oriented to person, place, and time.     Diabetic " Foot Exam Performed and Monofilament Test Performed  Result Review :Labs  Result Review  Imaging  Med Tab  Media                 Assessment and Plan CC Problem List  Visit Diagnosis  ROS  Review (Popup)  Health Maintenance  Quality  BestPractice  Medications  SmartSets  SnapShot Encounters  Media  Problem List Items Addressed This Visit        High    Type 2 diabetes mellitus without complication, with long-term current use of insulin (HCC) - Primary    Overview     A1c 6.1 01/04/2023 stable  A1c 5.7 10/05/2022 improved. He continues a good diet reg exercise BS at home 110  A1c 6.4 06/29/2022  A1c 6.0 03/18/2022 controlled.   A1c 6.3 12/01/2021  A1c 6.0 09/02/2021  A1c 6.2 06/02/2021  A1c 5.8 02/24/2021   A1c 6.1, 11/18/2020  A1c 5.9,  08/2020  Controlled.          Current Assessment & Plan     Diabetes is improving with treatment.   Continue current treatment regimen.  Regular aerobic exercise.  Diabetes will be reassessed in 3 months.         Relevant Orders    POCT Glucose (Completed)    POC Glycosylated Hemoglobin (Hb A1C) (Completed)    POCT urinalysis dipstick, manual (Completed)    Essential hypertension    Overview     Controlled. Compliant continue meds.          Current Assessment & Plan     Hypertension is improving with treatment.  Continue current treatment regimen.  Weight loss.  Regular aerobic exercise.  Blood pressure will be reassessed in 3 months.         Relevant Medications    EPINEPHrine (EPIPEN) 0.3 MG/0.3ML solution auto-injector injection       Medium    Mixed hyperlipidemia    Overview     Controlled with Fish oil d/c atorvastatin for cost, simvastatin. Compliant           Current Assessment & Plan     Lipid abnormalities are improving with treatment.  Pharmacotherapy as ordered.  Lipids will be reassessed in 3 months.            Low    Class 1 obesity due to excess calories with serious comorbidity and body mass index (BMI) of 31.0 to 31.9 in adult    Overview     Healthy  diet and regular exercise discussed and  Encouraged          Current Assessment & Plan     Patient's (Body mass index is 32.07 kg/m².) indicates that they are obese (BMI >30) with health conditions that include hypertension, diabetes mellitus and dyslipidemias . Weight is worsening. BMI is is above average; BMI management plan is completed. We discussed low calorie, low carb based diet program, portion control and increasing exercise.          History of tobacco use    Overview     2 ppd x 30 yrs  Low dose CT 11/2019 10/2020 negative, repeat ordered.   He continues to abstain.             Follow Up Instructions Charge Capture  Follow-up Communications  Return in about 3 months (around 4/4/2023).  Patient was given instructions and counseling regarding his condition or for health maintenance advice. Please see specific information pulled into the AVS if appropriate.

## 2023-01-23 NOTE — ASSESSMENT & PLAN NOTE
Patient's (Body mass index is 32.07 kg/m².) indicates that they are obese (BMI >30) with health conditions that include hypertension, diabetes mellitus and dyslipidemias . Weight is worsening. BMI is is above average; BMI management plan is completed. We discussed low calorie, low carb based diet program, portion control and increasing exercise.

## 2023-02-07 ENCOUNTER — HOSPITAL ENCOUNTER (OUTPATIENT)
Dept: CARDIOLOGY | Facility: HOSPITAL | Age: 73
Discharge: HOME | End: 2023-02-07

## 2023-02-07 ENCOUNTER — HOSPITAL ENCOUNTER (OUTPATIENT)
Dept: CT IMAGING | Facility: HOSPITAL | Age: 73
Discharge: HOME | End: 2023-02-07

## 2023-02-07 DIAGNOSIS — Z13.6 ENCOUNTER FOR SCREENING FOR VASCULAR DISEASE: ICD-10-CM

## 2023-02-07 PROBLEM — I71.42 JUXTARENAL ABDOMINAL AORTIC ANEURYSM (AAA) WITHOUT RUPTURE: Status: ACTIVE | Noted: 2023-02-07

## 2023-02-07 LAB
BH CV XLRA MEAS - MID AO DIAM: 2.1 CM
BH CV XLRA MEAS - PAD LEFT ABI PT: 1.27
BH CV XLRA MEAS - PAD LEFT ARM: 156 MMHG
BH CV XLRA MEAS - PAD LEFT LEG PT: 209 MMHG
BH CV XLRA MEAS - PAD RIGHT ABI PT: 1.43
BH CV XLRA MEAS - PAD RIGHT ARM: 164 MMHG
BH CV XLRA MEAS - PAD RIGHT LEG PT: 235 MMHG
BH CV XLRA MEAS LEFT DIST CCA EDV: 18.6 CM/SEC
BH CV XLRA MEAS LEFT DIST CCA PSV: 78.9 CM/SEC
BH CV XLRA MEAS LEFT ICA/CCA RATIO: 1
BH CV XLRA MEAS LEFT MID CCA PSV: 79 CM/SEC
BH CV XLRA MEAS LEFT MID ICA PSV: 82 CM/SEC
BH CV XLRA MEAS LEFT PROX ICA EDV: -21.1 CM/SEC
BH CV XLRA MEAS LEFT PROX ICA PSV: -82 CM/SEC
BH CV XLRA MEAS RIGHT DIST CCA EDV: 13.7 CM/SEC
BH CV XLRA MEAS RIGHT DIST CCA PSV: 74.6 CM/SEC
BH CV XLRA MEAS RIGHT ICA/CCA RATIO: 0.9
BH CV XLRA MEAS RIGHT MID CCA PSV: 75 CM/SEC
BH CV XLRA MEAS RIGHT MID ICA PSV: 65 CM/SEC
BH CV XLRA MEAS RIGHT PROX ICA EDV: -22.4 CM/SEC
BH CV XLRA MEAS RIGHT PROX ICA PSV: -64.6 CM/SEC
MAXIMAL PREDICTED HEART RATE: 147 BPM
STRESS TARGET HR: 125 BPM

## 2023-02-07 PROCEDURE — 93799 UNLISTED CV SVC/PROCEDURE: CPT

## 2023-02-07 PROCEDURE — 75571 CT HRT W/O DYE W/CA TEST: CPT

## 2023-02-22 ENCOUNTER — HOSPITAL ENCOUNTER (OUTPATIENT)
Dept: CT IMAGING | Facility: HOSPITAL | Age: 73
Discharge: HOME | End: 2023-02-22
Admitting: NURSE PRACTITIONER
Payer: MEDICARE

## 2023-02-22 ENCOUNTER — OFFICE VISIT (OUTPATIENT)
Dept: FAMILY MEDICINE CLINIC | Facility: CLINIC | Age: 73
End: 2023-02-22
Payer: MEDICARE

## 2023-02-22 VITALS
OXYGEN SATURATION: 96 % | SYSTOLIC BLOOD PRESSURE: 128 MMHG | BODY MASS INDEX: 31.86 KG/M2 | HEIGHT: 69 IN | TEMPERATURE: 98 F | WEIGHT: 215.1 LBS | HEART RATE: 51 BPM | DIASTOLIC BLOOD PRESSURE: 82 MMHG | RESPIRATION RATE: 20 BRPM

## 2023-02-22 DIAGNOSIS — M54.50 CHRONIC MIDLINE LOW BACK PAIN WITHOUT SCIATICA: ICD-10-CM

## 2023-02-22 DIAGNOSIS — Z85.51 HISTORY OF BLADDER CANCER: ICD-10-CM

## 2023-02-22 DIAGNOSIS — R10.9 ACUTE LEFT FLANK PAIN: Primary | ICD-10-CM

## 2023-02-22 DIAGNOSIS — E11.9 TYPE 2 DIABETES MELLITUS WITHOUT COMPLICATION, WITH LONG-TERM CURRENT USE OF INSULIN: ICD-10-CM

## 2023-02-22 DIAGNOSIS — Z79.4 TYPE 2 DIABETES MELLITUS WITHOUT COMPLICATION, WITH LONG-TERM CURRENT USE OF INSULIN: ICD-10-CM

## 2023-02-22 DIAGNOSIS — G89.29 CHRONIC MIDLINE LOW BACK PAIN WITHOUT SCIATICA: ICD-10-CM

## 2023-02-22 LAB
BILIRUB BLD-MCNC: NEGATIVE MG/DL
CLARITY, POC: CLEAR
COLOR UR: YELLOW
EXPIRATION DATE: NORMAL
GLUCOSE UR STRIP-MCNC: NEGATIVE MG/DL
KETONES UR QL: NEGATIVE
LEUKOCYTE EST, POC: NEGATIVE
Lab: NORMAL
NITRITE UR-MCNC: NEGATIVE MG/ML
PH UR: 6.5 [PH] (ref 5–8)
PROT UR STRIP-MCNC: NEGATIVE MG/DL
RBC # UR STRIP: NEGATIVE /UL
SP GR UR: 1.01 (ref 1–1.03)
UROBILINOGEN UR QL: NORMAL

## 2023-02-22 PROCEDURE — 99214 OFFICE O/P EST MOD 30 MIN: CPT | Performed by: NURSE PRACTITIONER

## 2023-02-22 PROCEDURE — 81003 URINALYSIS AUTO W/O SCOPE: CPT | Performed by: NURSE PRACTITIONER

## 2023-02-22 PROCEDURE — 74176 CT ABD & PELVIS W/O CONTRAST: CPT

## 2023-02-22 NOTE — PROGRESS NOTES
Chief Complaint  Back Pain and Blood Sugar Problem    Subjective          Herson Ruby presents to [facility] for flank pain and elevation in blood sugar.    History of Present Illness    Patient reports that he began having left flank pain about a week ago.  He says it feels like his belt was on too tight and caused him to have pain.  He has had nocturia only once a night and does not complain of any additional dysuria.  He has not seen any blood in his urine or increase in urination.  He is on Flomax and sees urology.  They suggested a procedure he does not want to have.  He has no history of kidney stones.  He reports this is a persistent pain without any rash on his back.  He has no radicular pain or lumbar pain like he had previously.  He is known to have degenerative disc disease in the lumbar area.  It did occur to him that he may have some type of urinary tract infection.  Has a known history of bladder cancer    Patient also takes his blood sugar daily if for his diabetes.  He reports he has had an increase in blood sugar to around the 140s most days since this pain began.  He is due for a 6-month recheck in April of his diabetes.  He is deferred hemoglobin A1c to be done until that time.  He will continue to watch his sugar if it elevates will see his primary.    He has no other symptoms and is concerned about the back pain because he is preparing to travel out of town in 2 days.        Herson Ruby  has a past medical history of Allergic, Bladder cancer (HCC), COPD (chronic obstructive pulmonary disease) (HCC), Diabetes mellitus (HCC), Diverticulosis, GERD (gastroesophageal reflux disease), Hyperlipidemia, Hypertension, and Liver mass, left lobe (11/18/2020).      Review of Systems   Constitutional: Negative for fatigue and fever.   Respiratory: Negative for cough.    Gastrointestinal: Negative for abdominal pain.   Endocrine: Negative for polyphagia and polyuria.  "  Genitourinary: Positive for flank pain. Negative for dysuria, frequency and urgency.        Objective       Current Outpatient Medications:   •  albuterol sulfate  (90 Base) MCG/ACT inhaler, Inhale 2 puffs Every 4 (Four) Hours As Needed for Wheezing., Disp: , Rfl:   •  ALLERGIST TRAY 1CC 26GX1/2\" 26G X 1/2\" 1 ML kit, USE AS DIRECTED TO ADMINISTER ALLERGY INJECTIONS, Disp: , Rfl:   •  amLODIPine (NORVASC) 10 MG tablet, TAKE 1 TABLET BY MOUTH EVERY DAY FOR BLOOD PRESSURE, Disp: 90 tablet, Rfl: 2  •  cyclobenzaprine (FLEXERIL) 10 MG tablet, Take 1 tablet by mouth Daily As Needed for Muscle Spasms., Disp: 30 tablet, Rfl: 2  •  EPINEPHrine (EPIPEN) 0.3 MG/0.3ML solution auto-injector injection, INJECT INTRAMUSCULARLY IN CASE OF ANAPHYLAXIS, Disp: , Rfl:   •  losartan (COZAAR) 50 MG tablet, TAKE 1 TABLET BY MOUTH EVERY DAY, Disp: 90 tablet, Rfl: 4  •  meloxicam (MOBIC) 15 MG tablet, Take 1 tablet by mouth Daily. (Patient taking differently: Take 15 mg by mouth. Every 2 to 3 days), Disp: 90 tablet, Rfl: 3  •  metFORMIN (GLUCOPHAGE) 500 MG tablet, TAKE 1 TABLET BY MOUTH TWICE A DAY, Disp: 180 tablet, Rfl: 3  •  Multiple Vitamins-Minerals (CENTRUM SILVER 50+MEN PO), Take  by mouth., Disp: , Rfl:   •  Omega-3 Fatty Acids (FISH OIL) 1000 MG capsule capsule, Take 1,000 mg by mouth Daily With Breakfast., Disp: , Rfl:   •  OneTouch Delica Lancets 33G misc, 1 each Daily., Disp: 30 each, Rfl: 12  •  OneTouch Ultra test strip, TEST BLOOD SUGAR ONCE DAILY, Disp: 100 each, Rfl: 12  •  simvastatin (ZOCOR) 80 MG tablet, TAKE 1 TABLET BY MOUTH EVERY DAY AT NIGHT, Disp: 90 tablet, Rfl: 3  •  tamsulosin (FLOMAX) 0.4 MG capsule 24 hr capsule, TAKE 1 CAPSULE BY MOUTH EVERY DAY, Disp: 90 capsule, Rfl: 3  •  vitamin C (ASCORBIC ACID) 500 MG tablet, Take 500 mg by mouth Daily., Disp: , Rfl:   •  gabapentin (NEURONTIN) 300 MG capsule, Take 1 capsule by mouth 2 (Two) Times a Day., Disp: 60 capsule, Rfl: 2    Vital Signs:      /82  " " Pulse 51   Temp 98 °F (36.7 °C) (Infrared)   Resp 20   Ht 175.3 cm (69.02\")   Wt 97.6 kg (215 lb 1.6 oz)   SpO2 96%   BMI 31.75 kg/m²     Vitals:    02/22/23 1115 02/22/23 1131   BP: 149/86 128/82   BP Location: Left arm    Patient Position: Sitting    Cuff Size: Adult    Pulse: 51    Resp: 20    Temp: 98 °F (36.7 °C)    TempSrc: Infrared    SpO2: 96%    Weight: 97.6 kg (215 lb 1.6 oz)    Height: 175.3 cm (69.02\")       Physical Exam  Vitals and nursing note reviewed.   Constitutional:       Appearance: He is well-developed. He is obese.   Cardiovascular:      Rate and Rhythm: Normal rate and regular rhythm.      Heart sounds: Normal heart sounds. No murmur heard.    No friction rub. No gallop.   Pulmonary:      Effort: Pulmonary effort is normal.      Breath sounds: Normal breath sounds. No wheezing or rales.   Abdominal:      General: Bowel sounds are normal.      Palpations: Abdomen is soft.      Tenderness: There is no abdominal tenderness. There is no guarding or rebound. Negative signs include Storey's sign.      Comments: Left flank tenderness no rash noted on skin   Skin:     General: Skin is warm and dry.   Neurological:      Mental Status: He is alert.        Result Review :                  PHQ-9 Total Score: 0           Assessment and Plan    Diagnoses and all orders for this visit:    1. Acute left flank pain (Primary)  Comments:  Urine culture today.  Discussed with patient negative for hematuria but will do CT today if possible.  Orders:  -     CT Abdomen Pelvis Stone Protocol  -     Urine Culture - Urine, Urine, Random Void    2. Chronic midline low back pain without sciatica  Comments:  Remission for now.  Previously saw chiropractor and has improved  Orders:  -     POCT urinalysis dipstick, automated    3. History of bladder cancer    4. Type 2 diabetes mellitus without complication, with long-term current use of insulin ([facility])  Comments:  Continue monitoring blood sugar.  If higher readings " needs to call back.  Currently running in the 140s.  Declined A1c today    Other orders  -     Cancel: CT Abdomen Pelvis Stone Protocol  -     Cancel: CT Abdomen Pelvis Stone Protocol         Problem List Items Addressed This Visit        Active Problems    Type 2 diabetes mellitus without complication, with long-term current use of insulin ([facility])    Overview     A1c 6.1 01/04/2023 stable  A1c 5.7 10/05/2022 improved. He continues a good diet reg exercise BS at home 110  A1c 6.4 06/29/2022  A1c 6.0 03/18/2022 controlled.   A1c 6.3 12/01/2021  A1c 6.0 09/02/2021  A1c 6.2 06/02/2021  A1c 5.8 02/24/2021   A1c 6.1, 11/18/2020  A1c 5.9,  08/2020  Controlled.          History of bladder cancer    Overview     2002, stopped smoking.   Followed with [facility]. Last seen 10/2020        Other Visit Diagnoses     Acute left flank pain    -  Primary    Urine culture today.  Discussed with patient negative for hematuria but will do CT today if possible.    Relevant Orders    CT Abdomen Pelvis Stone Protocol    Urine Culture - Urine, Urine, Random Void    Chronic midline low back pain without sciatica        Remission for now.  Previously saw chiropractor and has improved    Relevant Orders    POCT urinalysis dipstick, automated (Completed)          Follow Up   Return if symptoms worsen or fail to improve, for We will call with results of CT and culture.  Monitor blood sugar and continue fu with PCP.  Patient was given instructions and counseling regarding his condition or for health maintenance advice. Please see specific information pulled into the AVS if appropriate.

## 2023-02-24 LAB
BACTERIA UR CULT: NO GROWTH
BACTERIA UR CULT: NORMAL

## 2023-04-07 ENCOUNTER — OFFICE VISIT (OUTPATIENT)
Dept: FAMILY MEDICINE CLINIC | Facility: CLINIC | Age: 73
End: 2023-04-07
Payer: MEDICARE

## 2023-04-07 VITALS
HEART RATE: 55 BPM | SYSTOLIC BLOOD PRESSURE: 150 MMHG | WEIGHT: 214.6 LBS | TEMPERATURE: 97.3 F | OXYGEN SATURATION: 95 % | DIASTOLIC BLOOD PRESSURE: 90 MMHG | RESPIRATION RATE: 18 BRPM | BODY MASS INDEX: 31.78 KG/M2 | HEIGHT: 69 IN

## 2023-04-07 DIAGNOSIS — Z79.4 TYPE 2 DIABETES MELLITUS WITHOUT COMPLICATION, WITH LONG-TERM CURRENT USE OF INSULIN: Primary | ICD-10-CM

## 2023-04-07 DIAGNOSIS — E66.09 CLASS 1 OBESITY DUE TO EXCESS CALORIES WITH SERIOUS COMORBIDITY AND BODY MASS INDEX (BMI) OF 31.0 TO 31.9 IN ADULT: ICD-10-CM

## 2023-04-07 DIAGNOSIS — M51.9 LUMBAR DISC DISEASE: ICD-10-CM

## 2023-04-07 DIAGNOSIS — I10 ESSENTIAL HYPERTENSION: ICD-10-CM

## 2023-04-07 DIAGNOSIS — E11.9 TYPE 2 DIABETES MELLITUS WITHOUT COMPLICATION, WITH LONG-TERM CURRENT USE OF INSULIN: Primary | ICD-10-CM

## 2023-04-07 DIAGNOSIS — E78.2 MIXED HYPERLIPIDEMIA: ICD-10-CM

## 2023-04-07 LAB
BILIRUB BLD-MCNC: NEGATIVE MG/DL
CLARITY, POC: CLEAR
COLOR UR: YELLOW
GLUCOSE UR STRIP-MCNC: NEGATIVE MG/DL
KETONES UR QL: NEGATIVE
LEUKOCYTE EST, POC: NEGATIVE
NITRITE UR-MCNC: NEGATIVE MG/ML
PH UR: 5 [PH] (ref 5–8)
PROT UR STRIP-MCNC: NEGATIVE MG/DL
RBC # UR STRIP: NEGATIVE /UL
SP GR UR: 1.02 (ref 1–1.03)
UROBILINOGEN UR QL: NORMAL

## 2023-04-07 RX ORDER — DUTASTERIDE 0.5 MG/1
0.5 CAPSULE, LIQUID FILLED ORAL DAILY
Qty: 90 CAPSULE | Refills: 3 | Status: SHIPPED | OUTPATIENT
Start: 2023-04-07

## 2023-04-07 RX ORDER — MELOXICAM 15 MG/1
15 TABLET ORAL DAILY
Qty: 90 TABLET | Refills: 3 | Status: SHIPPED | OUTPATIENT
Start: 2023-04-07

## 2023-04-07 NOTE — ASSESSMENT & PLAN NOTE
Patient's (Body mass index is 31.67 kg/m².) indicates that they are obese (BMI >30) with health conditions that include none . Weight is improving with treatment. BMI  is above average; BMI management plan is completed. We discussed low calorie, low carb based diet program, portion control and increasing exercise.

## 2023-04-07 NOTE — PROGRESS NOTES
Call to patient. Voice mail. Dosing instructions left for patient verbally on voice mail. Clinic number provided and advised to call the clinic with any questions or concerns, medication or dietary changes or if dosing is different than what is listed.     Anticoagulation Summary  As of 2020    INR goal:     TTR:  55.6 % (4.1 y)   Prior goal:  2.0-3.0   INR used for dosin.8 (2020)   Warfarin maintenance plan:  6 mg (2 mg x 3) every W, F; 5 mg (2 mg x 2.5) all other days   Weekly warfarin total:  37 mg   Plan last modified:  Capri Washburn RN (2020)   Next INR check:  2020   Priority:  Follow-Up - 2 Weeks   Target end date:  Indefinite    Indications    Atrial fibrillation  paroxysmal [I48.0]  Encounter for therapeutic drug monitoring [Z51.81]  Warfarin anticoagulation [Z79.01]             Anticoagulation Episode Summary     INR check location:  Outside Lab    Preferred lab:      Send INR reminders to:  [facility] (OPEN ENROLLMENT) ACS CARD/EP    Comments:  *Next STAC due 20; **INR GOAL: 1.8-2.2; Home monitor; cc'd lab 20; 2mg tablets; \"Jose\"        Anticoagulation Care Providers     Provider Role Specialty Phone number    New Murdock MD Responsible Internal Medicine - Clinical Cardiac Electrophysiology [phone]          Supervising provider: Jayesh Meza MD   Chief Complaint  Diabetes, Hypertension, and Hyperlipidemia    Subjective     CC  Problem List  Visit Diagnosis   Encounters  Notes  Medications  Labs  Result Review Imaging  Media    Herson Ruby presents to [facility] for   Hypertension  This is a chronic problem. The current episode started more than 1 year ago. The problem has been waxing and waning since onset. The problem is uncontrolled. Pertinent negatives include no anxiety, chest pain, headaches, malaise/fatigue, orthopnea, palpitations, peripheral edema, PND, shortness of breath or sweats. Risk factors for coronary artery disease include diabetes mellitus, dyslipidemia, male gender, family history and obesity. Current antihypertension treatment includes calcium channel blockers and angiotensin blockers. The current treatment provides mild improvement. There are no compliance problems.    Hyperlipidemia  This is a chronic problem. The current episode started more than 1 year ago. The problem is controlled. Recent lipid tests were reviewed and are normal. Exacerbating diseases include diabetes and obesity. He has no history of hypothyroidism. Pertinent negatives include no chest pain, focal sensory loss, focal weakness, leg pain or shortness of breath. Current antihyperlipidemic treatment includes statins. The current treatment provides moderate improvement of lipids. There are no compliance problems.  Risk factors for coronary artery disease include dyslipidemia, diabetes mellitus, hypertension, family history, obesity and male sex.   Diabetes  He presents for his follow-up diabetic visit. He has type 2 diabetes mellitus. His disease course has been stable. Pertinent negatives for hypoglycemia include no confusion, dizziness, headaches, hunger, mood changes, sleepiness or sweats. There are no diabetic associated symptoms. Pertinent negatives for diabetes include no chest pain, no fatigue, no foot paresthesias, no  "foot ulcerations, no polydipsia, no polyuria, no visual change, no weakness and no weight loss. There are no hypoglycemic complications. Symptoms are stable. There are no diabetic complications. Risk factors for coronary artery disease include family history. Current diabetic treatment includes oral agent (monotherapy). He is compliant with treatment all of the time. His weight is fluctuating minimally. He is following a diabetic and generally healthy diet. Meal planning includes avoidance of concentrated sweets. He has not had a previous visit with a dietitian. His overall blood glucose range is  mg/dl. (Home blood sugar was 102) An ACE inhibitor/angiotensin II receptor blocker is not being taken. He does not see a podiatrist.Eye exam is current ([facility] in [place]).       Review of Systems   Constitutional: Negative for appetite change, fatigue, malaise/fatigue and unexpected weight loss.   Eyes: Negative for visual disturbance.   Respiratory: Negative for shortness of breath.    Cardiovascular: Negative for chest pain, palpitations, orthopnea, leg swelling and PND.   Gastrointestinal: Negative for abdominal pain, constipation, diarrhea, nausea and vomiting.   Endocrine: Negative for cold intolerance, heat intolerance, polydipsia and polyuria.   Musculoskeletal: Positive for arthralgias ( multiple stable) and back pain (mild to moderate and intermittent ).   Skin: Negative for rash.   Neurological: Negative for dizziness, focal weakness, weakness, numbness and confusion.   Hematological: Negative for adenopathy. Does not bruise/bleed easily.        Objective   Vital Signs:   /90 (BP Location: Right arm, Patient Position: Sitting, Cuff Size: Adult)   Pulse 55   Temp 97.3 °F (36.3 °C) (Temporal)   Resp 18   Ht 175.3 cm (69.02\")   Wt 97.3 kg (214 lb 9.6 oz)   SpO2 95% Comment: room air  BMI 31.67 kg/m²     Physical Exam  Constitutional:       General: He is not in acute " distress.  Cardiovascular:      Rate and Rhythm: Normal rate and regular rhythm.      Pulses:           Dorsalis pedis pulses are 2+ on the right side and 2+ on the left side.      Heart sounds: Normal heart sounds. No murmur heard.     Comments: Neg Isabel's  Pulmonary:      Effort: Pulmonary effort is normal.      Breath sounds: Normal breath sounds.   Musculoskeletal:      Cervical back: Neck supple.      Left lower leg: No edema.   Feet:      Right foot:      Protective Sensation: 10 sites tested. 10 sites sensed.      Skin integrity: Dry skin present.      Left foot:      Protective Sensation: 10 sites tested. 10 sites sensed.      Skin integrity: Dry skin present.   Lymphadenopathy:      Cervical: No cervical adenopathy.   Skin:     Findings: No rash.   Neurological:      Mental Status: He is alert and oriented to person, place, and time.        Result Review :Labs  Result Review  Imaging  Med Tab  Media                 Assessment and Plan CC Problem List  Visit Diagnosis  ROS  Review (Popup)  Health Maintenance  Quality  BestPractice  Medications  SmartSets  SnapShot Encounters  Media  Problem List Items Addressed This Visit        High    Type 2 diabetes mellitus without complication, with long-term current use of insulin ([facility]) - Primary    Overview     A1c   A1c 6.1 01/04/2023 stable  A1c 5.7 10/05/2022 improved. He continues a good diet reg exercise BS at home 110  A1c 6.4 06/29/2022  A1c 6.0 03/18/2022 controlled.   A1c 6.3 12/01/2021  A1c 6.0 09/02/2021  A1c 6.2 06/02/2021  A1c 5.8 02/24/2021   A1c 6.1, 11/18/2020  A1c 5.9,  08/2020  Controlled.          Current Assessment & Plan     Diabetes is improving with treatment.   Continue current treatment regimen.  Reminded to bring in blood sugar diary at next visit.  Regular aerobic exercise.  Diabetes will be reassessed in 3 months.         Relevant Orders    POCT urinalysis dipstick, manual (Completed)    Hemoglobin A1c (Completed)     Essential hypertension    Overview     Controlled. Compliant continue meds.          Current Assessment & Plan     Hypertension is improving with treatment.  Continue current treatment regimen.  Dietary sodium restriction.  Weight loss.  Regular aerobic exercise.  Blood pressure will be reassessed in 3 months.            Medium    Mixed hyperlipidemia    Overview     Controlled with Fish oil d/c atorvastatin for cost, simvastatin. Compliant           Current Assessment & Plan     Lipid abnormalities are improving with treatment.  Pharmacotherapy as ordered.  Lipids will be reassessed in 3 months.            Low    Class 1 obesity due to excess calories with serious comorbidity and body mass index (BMI) of 31.0 to 31.9 in adult    Overview     Healthy diet and regular exercise discussed and  Encouraged          Current Assessment & Plan     Patient's (Body mass index is 31.67 kg/m².) indicates that they are obese (BMI >30) with health conditions that include none . Weight is improving with treatment. BMI  is above average; BMI management plan is completed. We discussed low calorie, low carb based diet program, portion control and increasing exercise.             Unprioritized    Lumbar disc disease    Overview     anterolisthesis L5-S1 with severe arthropathy, on xray 10.14.2020  Sxs are controlled with sparing use of Anti inflammatories ie meloxicam, refilled.          Relevant Medications    meloxicam (MOBIC) 15 MG tablet       Follow Up Instructions Charge Capture  Follow-up Communications  Return in about 3 months (around 7/7/2023).  Patient was given instructions and counseling regarding his condition or for health maintenance advice. Please see specific information pulled into the AVS if appropriate.

## 2023-04-08 LAB — HBA1C MFR BLD: 6.2 % (ref 4.8–5.6)

## 2023-04-16 DIAGNOSIS — M51.9 LUMBAR DISC DISEASE: ICD-10-CM

## 2023-04-17 RX ORDER — CYCLOBENZAPRINE HCL 10 MG
10 TABLET ORAL DAILY PRN
Qty: 30 TABLET | Refills: 3 | Status: SHIPPED | OUTPATIENT
Start: 2023-04-17

## 2023-06-07 DIAGNOSIS — I10 ESSENTIAL HYPERTENSION: ICD-10-CM

## 2023-06-07 DIAGNOSIS — E78.2 MIXED HYPERLIPIDEMIA: ICD-10-CM

## 2023-06-08 RX ORDER — SIMVASTATIN 80 MG
TABLET ORAL
Qty: 90 TABLET | Refills: 3 | Status: SHIPPED | OUTPATIENT
Start: 2023-06-08

## 2023-06-08 RX ORDER — AMLODIPINE BESYLATE 10 MG/1
TABLET ORAL
Qty: 90 TABLET | Refills: 2 | Status: SHIPPED | OUTPATIENT
Start: 2023-06-08

## 2023-09-18 DIAGNOSIS — I10 ESSENTIAL HYPERTENSION: ICD-10-CM

## 2023-09-18 RX ORDER — LOSARTAN POTASSIUM 50 MG/1
TABLET ORAL
Qty: 90 TABLET | Refills: 4 | Status: SHIPPED | OUTPATIENT
Start: 2023-09-18

## 2023-10-16 ENCOUNTER — OFFICE VISIT (OUTPATIENT)
Dept: FAMILY MEDICINE CLINIC | Facility: CLINIC | Age: 73
End: 2023-10-16
Payer: MEDICARE

## 2023-10-16 VITALS
TEMPERATURE: 97.1 F | OXYGEN SATURATION: 98 % | HEIGHT: 69 IN | RESPIRATION RATE: 20 BRPM | BODY MASS INDEX: 32.91 KG/M2 | HEART RATE: 77 BPM | WEIGHT: 222.2 LBS | DIASTOLIC BLOOD PRESSURE: 80 MMHG | SYSTOLIC BLOOD PRESSURE: 138 MMHG

## 2023-10-16 DIAGNOSIS — Z12.5 SCREENING FOR PROSTATE CANCER: ICD-10-CM

## 2023-10-16 DIAGNOSIS — Z79.4 TYPE 2 DIABETES MELLITUS WITHOUT COMPLICATION, WITH LONG-TERM CURRENT USE OF INSULIN: Primary | ICD-10-CM

## 2023-10-16 DIAGNOSIS — E66.09 CLASS 1 OBESITY DUE TO EXCESS CALORIES WITH SERIOUS COMORBIDITY AND BODY MASS INDEX (BMI) OF 32.0 TO 32.9 IN ADULT: ICD-10-CM

## 2023-10-16 DIAGNOSIS — E11.9 TYPE 2 DIABETES MELLITUS WITHOUT COMPLICATION, WITH LONG-TERM CURRENT USE OF INSULIN: Primary | ICD-10-CM

## 2023-10-16 DIAGNOSIS — E78.2 MIXED HYPERLIPIDEMIA: ICD-10-CM

## 2023-10-16 DIAGNOSIS — I10 ESSENTIAL HYPERTENSION: ICD-10-CM

## 2023-10-16 LAB
BILIRUB BLD-MCNC: NEGATIVE MG/DL
CLARITY, POC: CLEAR
COLOR UR: YELLOW
EXPIRATION DATE: ABNORMAL
GLUCOSE UR STRIP-MCNC: NEGATIVE MG/DL
HBA1C MFR BLD: 6.4 % (ref 4.5–5.7)
KETONES UR QL: NEGATIVE
LEUKOCYTE EST, POC: NEGATIVE
Lab: ABNORMAL
NITRITE UR-MCNC: NEGATIVE MG/ML
PH UR: 6 [PH] (ref 5–8)
PROT UR STRIP-MCNC: NEGATIVE MG/DL
RBC # UR STRIP: NEGATIVE /UL
SP GR UR: 1.02 (ref 1–1.03)
UROBILINOGEN UR QL: NORMAL

## 2023-10-16 PROCEDURE — 81002 URINALYSIS NONAUTO W/O SCOPE: CPT | Performed by: FAMILY MEDICINE

## 2023-10-16 PROCEDURE — 3044F HG A1C LEVEL LT 7.0%: CPT | Performed by: FAMILY MEDICINE

## 2023-10-16 PROCEDURE — 99214 OFFICE O/P EST MOD 30 MIN: CPT | Performed by: FAMILY MEDICINE

## 2023-10-16 PROCEDURE — 3079F DIAST BP 80-89 MM HG: CPT | Performed by: FAMILY MEDICINE

## 2023-10-16 PROCEDURE — 3075F SYST BP GE 130 - 139MM HG: CPT | Performed by: FAMILY MEDICINE

## 2023-10-16 PROCEDURE — 83036 HEMOGLOBIN GLYCOSYLATED A1C: CPT | Performed by: FAMILY MEDICINE

## 2023-10-16 PROCEDURE — 36415 COLL VENOUS BLD VENIPUNCTURE: CPT | Performed by: FAMILY MEDICINE

## 2023-10-16 RX ORDER — ASPIRIN 81 MG/1
81 TABLET ORAL DAILY
COMMUNITY

## 2023-10-16 RX ORDER — BLOOD SUGAR DIAGNOSTIC
1 STRIP MISCELLANEOUS DAILY
Qty: 100 EACH | Refills: 12 | Status: SHIPPED | OUTPATIENT
Start: 2023-10-16

## 2023-10-16 NOTE — ASSESSMENT & PLAN NOTE
Patient's (Body mass index is 32.81 kg/m².) indicates that they are obese (BMI >30) with health conditions that include hypertension . Weight is improving with treatment. BMI  is above average; BMI management plan is completed. We discussed portion control and increasing exercise.    yes

## 2023-10-16 NOTE — PROGRESS NOTES
Chief Complaint  Diabetes, Hyperlipidemia, Hypertension, and Obesity    Subjective     CC  Problem List  Visit Diagnosis   Encounters  Notes  Medications  Labs  Result Review Imaging  Media    Herson Ruby presents to [facility] for   Hypertension  This is a chronic problem. The current episode started more than 1 year ago. The problem has been waxing and waning since onset. The problem is uncontrolled. Pertinent negatives include no anxiety, blurred vision, chest pain, headaches, malaise/fatigue, neck pain, orthopnea, palpitations, peripheral edema, PND, shortness of breath or sweats. Risk factors for coronary artery disease include diabetes mellitus, dyslipidemia, male gender, family history and obesity. Current antihypertension treatment includes calcium channel blockers and angiotensin blockers. The current treatment provides mild improvement. There are no compliance problems.    Hyperlipidemia  This is a chronic problem. The current episode started more than 1 year ago. The problem is controlled. Recent lipid tests were reviewed and are normal. Exacerbating diseases include diabetes and obesity. He has no history of hypothyroidism. Pertinent negatives include no chest pain, focal sensory loss, focal weakness, leg pain, myalgias or shortness of breath. (Muscle cramps. ) Current antihyperlipidemic treatment includes statins. The current treatment provides moderate improvement of lipids. There are no compliance problems.  Risk factors for coronary artery disease include dyslipidemia, diabetes mellitus, hypertension, family history, obesity and male sex.   Diabetes  He presents for his follow-up diabetic visit. He has type 2 diabetes mellitus. His disease course has been stable. Pertinent negatives for hypoglycemia include no headaches, hunger, mood changes, sleepiness or sweats. Pertinent negatives for diabetes include no blurred vision, no chest pain, no fatigue, no foot  "paresthesias, no foot ulcerations, no polydipsia, no polyuria, no visual change, no weakness and no weight loss. There are no hypoglycemic complications. Symptoms are stable. There are no diabetic complications. Risk factors for coronary artery disease include family history. Current diabetic treatment includes oral agent (monotherapy). He is compliant with treatment all of the time. His weight is fluctuating minimally. He is following a diabetic and generally healthy diet. Meal planning includes avoidance of concentrated sweets. He has not had a previous visit with a dietitian. His overall blood glucose range is  mg/dl. (Home blood sugar was 117) An ACE inhibitor/angiotensin II receptor blocker is not being taken. He does not see a podiatrist.Eye exam is current ([facility] in [place]).       Review of Systems   Constitutional:  Negative for activity change, appetite change, fatigue, malaise/fatigue and unexpected weight loss.   Eyes:  Negative for blurred vision and visual disturbance.   Respiratory:  Negative for shortness of breath and wheezing.    Cardiovascular:  Negative for chest pain, palpitations, orthopnea, leg swelling and PND.   Gastrointestinal:  Negative for abdominal pain, constipation, diarrhea, nausea and vomiting.   Endocrine: Negative for cold intolerance, heat intolerance, polydipsia and polyuria.   Genitourinary:  Negative for dysuria.   Musculoskeletal:  Negative for myalgias and neck pain.   Skin:  Negative for rash.   Neurological:  Negative for focal weakness, weakness and numbness.   Hematological:  Negative for adenopathy. Does not bruise/bleed easily.   Psychiatric/Behavioral:  Negative for dysphoric mood.         Objective   Vital Signs:   /80   Pulse 77   Temp 97.1 °F (36.2 °C) (Infrared)   Resp 20   Ht 175.3 cm (69\")   Wt 101 kg (222 lb 3.2 oz)   SpO2 98% Comment: roomair  BMI 32.81 kg/m²     Physical Exam  Constitutional:       General: He is not in " acute distress.  Cardiovascular:      Rate and Rhythm: Normal rate and regular rhythm.      Pulses:           Dorsalis pedis pulses are 2+ on the right side and 2+ on the left side.      Heart sounds: Normal heart sounds. No murmur heard.     Comments: Neg Isabel's  Pulmonary:      Effort: Pulmonary effort is normal.      Breath sounds: Normal breath sounds.   Musculoskeletal:      Cervical back: Neck supple.      Right lower leg: No edema.      Left lower leg: No edema.   Feet:      Right foot:      Protective Sensation: 10 sites tested.  10 sites sensed.      Skin integrity: Dry skin present.      Left foot:      Protective Sensation: 10 sites tested.  10 sites sensed.      Skin integrity: Dry skin present.   Lymphadenopathy:      Cervical: No cervical adenopathy.   Skin:     Findings: No rash.   Neurological:      Mental Status: He is alert and oriented to person, place, and time.        Result Review :Labs  Result Review  Imaging  Med Tab  Media                 Assessment and Plan CC Problem List  Visit Diagnosis  ROS  Review (Popup)  Health Maintenance  Quality  BestPractice  Medications  SmartSets  SnapShot Encounters  Media  Problem List Items Addressed This Visit          High    Type 2 diabetes mellitus without complication, with long-term current use of insulin - Primary    Overview     A1c 6.4 10/16/2023 diet controlled.   A1c 6.2 07/11/2023   A1c 6.2 04/07/2023  A1c 6.1 01/04/2023   A1c 6.0 03/18/2022   A1c 6.3 12/01/2021  A1c 6.1, 11/18/2020  A1c 5.9,  08/2020  Controlled.          Current Assessment & Plan     Diabetes is worsening.   Continue current treatment regimen.  Diabetes will be reassessed in 3 months.         Relevant Medications    glucose blood (OneTouch Ultra) test strip    Other Relevant Orders    POC Glycosylated Hemoglobin (Hb A1C) (Completed)    POCT urinalysis dipstick, manual (Completed)    Essential hypertension    Overview     Controlled. Compliant continue meds.           Current Assessment & Plan     Hypertension is improving with treatment.  Continue current treatment regimen.  Dietary sodium restriction.  Weight loss.  Regular aerobic exercise.  Blood pressure will be reassessed in 3 months.         Relevant Orders    CBC & Differential    Comprehensive Metabolic Panel    TSH       Medium    Mixed hyperlipidemia    Overview     Controlled with Fish oil d/c atorvastatin for cost, simvastatin. Compliant           Current Assessment & Plan     Lipid abnormalities are improving with treatment.  Nutritional counseling was provided. and Pharmacotherapy as ordered.  Lipids will be reassessed in 3 months.         Relevant Orders    Lipid Panel With / Chol / HDL Ratio       Low    Class 1 obesity due to excess calories with serious comorbidity and body mass index (BMI) of 32.0 to 32.9 in adult    Overview     Healthy diet and regular exercise discussed and  Encouraged          Current Assessment & Plan     Patient's (Body mass index is 32.81 kg/m².) indicates that they are obese (BMI >30) with health conditions that include hypertension . Weight is improving with treatment. BMI  is above average; BMI management plan is completed. We discussed portion control and increasing exercise.             Unprioritized    Screening for prostate cancer    Overview     Lab Results   Component Value Date    PSA 1.2 10/05/2022    PSA 2.2 09/01/2021    PSA 1.9 08/06/2020            Relevant Orders    PSA Screen    Site care done- cleaned with alcohol swab, procedure tolerated well, dressing applied. Venipuncture was obtained after 1 time(s). 3 tubes were drawn. Needle gauge used was 23-butterfly.      Follow Up Instructions Charge Capture  Follow-up Communications  Return in about 2 weeks (around 10/30/2023) for Medicare Wellness.  Patient was given instructions and counseling regarding his condition or for health maintenance advice. Please see specific information pulled into the AVS if  appropriate.

## 2023-10-17 LAB
ALBUMIN SERPL-MCNC: 3.9 G/DL (ref 3.8–4.8)
ALBUMIN/GLOB SERPL: 1.4 {RATIO} (ref 1.2–2.2)
ALP SERPL-CCNC: 56 IU/L (ref 44–121)
ALT SERPL-CCNC: 48 IU/L (ref 0–44)
AST SERPL-CCNC: 42 IU/L (ref 0–40)
BASOPHILS # BLD AUTO: 0.1 X10E3/UL (ref 0–0.2)
BASOPHILS NFR BLD AUTO: 1 %
BILIRUB SERPL-MCNC: 0.4 MG/DL (ref 0–1.2)
BUN SERPL-MCNC: 12 MG/DL (ref 8–27)
BUN/CREAT SERPL: 11 (ref 10–24)
CALCIUM SERPL-MCNC: 9.4 MG/DL (ref 8.6–10.2)
CHLORIDE SERPL-SCNC: 105 MMOL/L (ref 96–106)
CHOLEST SERPL-MCNC: 184 MG/DL (ref 100–199)
CHOLEST/HDLC SERPL: 2.8 RATIO (ref 0–5)
CO2 SERPL-SCNC: 27 MMOL/L (ref 20–29)
CREAT SERPL-MCNC: 1.05 MG/DL (ref 0.76–1.27)
EGFRCR SERPLBLD CKD-EPI 2021: 75 ML/MIN/1.73
EOSINOPHIL # BLD AUTO: 0.2 X10E3/UL (ref 0–0.4)
EOSINOPHIL NFR BLD AUTO: 3 %
ERYTHROCYTE [DISTWIDTH] IN BLOOD BY AUTOMATED COUNT: 14.3 % (ref 11.6–15.4)
GLOBULIN SER CALC-MCNC: 2.7 G/DL (ref 1.5–4.5)
GLUCOSE SERPL-MCNC: 105 MG/DL (ref 70–99)
HCT VFR BLD AUTO: 44.9 % (ref 37.5–51)
HDLC SERPL-MCNC: 65 MG/DL
HGB BLD-MCNC: 14.1 G/DL (ref 13–17.7)
IMM GRANULOCYTES # BLD AUTO: 0 X10E3/UL (ref 0–0.1)
IMM GRANULOCYTES NFR BLD AUTO: 0 %
LDLC SERPL CALC-MCNC: 90 MG/DL (ref 0–99)
LYMPHOCYTES # BLD AUTO: 1.5 X10E3/UL (ref 0.7–3.1)
LYMPHOCYTES NFR BLD AUTO: 25 %
MCH RBC QN AUTO: 25.5 PG (ref 26.6–33)
MCHC RBC AUTO-ENTMCNC: 31.4 G/DL (ref 31.5–35.7)
MCV RBC AUTO: 81 FL (ref 79–97)
MONOCYTES # BLD AUTO: 0.5 X10E3/UL (ref 0.1–0.9)
MONOCYTES NFR BLD AUTO: 9 %
NEUTROPHILS # BLD AUTO: 3.6 X10E3/UL (ref 1.4–7)
NEUTROPHILS NFR BLD AUTO: 62 %
PLATELET # BLD AUTO: 277 X10E3/UL (ref 150–450)
POTASSIUM SERPL-SCNC: 4.7 MMOL/L (ref 3.5–5.2)
PROT SERPL-MCNC: 6.6 G/DL (ref 6–8.5)
PSA SERPL-MCNC: 1 NG/ML (ref 0–4)
RBC # BLD AUTO: 5.53 X10E6/UL (ref 4.14–5.8)
SODIUM SERPL-SCNC: 143 MMOL/L (ref 134–144)
TRIGL SERPL-MCNC: 172 MG/DL (ref 0–149)
TSH SERPL DL<=0.005 MIU/L-ACNC: 1.77 UIU/ML (ref 0.45–4.5)
VLDLC SERPL CALC-MCNC: 29 MG/DL (ref 5–40)
WBC # BLD AUTO: 5.9 X10E3/UL (ref 3.4–10.8)

## 2023-10-17 NOTE — PROGRESS NOTES
Yesmail message was sent   Good afternoon we have your lab back and per Dr. John   You have a normal white blood cell count, normal platelet count( This is the blood clotting factor) and no signs of anemia   You have a normal kidney, electrolyte and thyroid function. You have two of the 4 liver enzymes minimally elevated with the AST at 42 and the ALT was at 48. This could be evaluated if there was any tylenol type products and or any type of alcoholic beverages. We will check this again when you are in on the 30th when you are in and be sure to not have any of the tylenol and or alcohol products for at least 24-48 hours prior to us redrawing this.   Your total cholesterol was at 184 and this has increased as it was 159 the last time, your Triglycerides was at 172 and this has increased  as it was 135 the last time, your HDL ( healthy cholesterol) was at 65 and this has increased as it was 54 the last time( this is good as we want it to be as high as we can get it and we do this by exercise, exercise ) your LDL ( lousy cholesterol) was at 90 and this has increased slightly as it was 81 the last time and your T.chol/ cardiac ratio was at 2.8 and this has decreased as it was 2.9 the last time. Keep up the good work and continue working on diet and exercise and taking medications as prescribed and we will check labs again in 1 year.   Your Psa has come back normal as well.

## 2023-10-30 ENCOUNTER — OFFICE VISIT (OUTPATIENT)
Dept: FAMILY MEDICINE CLINIC | Facility: CLINIC | Age: 73
End: 2023-10-30
Payer: MEDICARE

## 2023-10-30 VITALS
HEIGHT: 69 IN | RESPIRATION RATE: 20 BRPM | SYSTOLIC BLOOD PRESSURE: 126 MMHG | BODY MASS INDEX: 33 KG/M2 | HEART RATE: 75 BPM | TEMPERATURE: 97.7 F | WEIGHT: 222.8 LBS | OXYGEN SATURATION: 97 % | DIASTOLIC BLOOD PRESSURE: 78 MMHG

## 2023-10-30 DIAGNOSIS — I10 ESSENTIAL HYPERTENSION: ICD-10-CM

## 2023-10-30 DIAGNOSIS — Z12.5 SCREENING FOR PROSTATE CANCER: ICD-10-CM

## 2023-10-30 DIAGNOSIS — J30.1 NON-SEASONAL ALLERGIC RHINITIS DUE TO POLLEN: ICD-10-CM

## 2023-10-30 DIAGNOSIS — Z12.11 SCREENING FOR COLON CANCER: ICD-10-CM

## 2023-10-30 DIAGNOSIS — N13.8 BENIGN PROSTATIC HYPERPLASIA WITH URINARY OBSTRUCTION: ICD-10-CM

## 2023-10-30 DIAGNOSIS — Z87.891 HISTORY OF TOBACCO USE: ICD-10-CM

## 2023-10-30 DIAGNOSIS — E78.2 MIXED HYPERLIPIDEMIA: ICD-10-CM

## 2023-10-30 DIAGNOSIS — Z79.4 TYPE 2 DIABETES MELLITUS WITHOUT COMPLICATION, WITH LONG-TERM CURRENT USE OF INSULIN: ICD-10-CM

## 2023-10-30 DIAGNOSIS — J43.2 CENTRILOBULAR EMPHYSEMA: ICD-10-CM

## 2023-10-30 DIAGNOSIS — Z00.00 MEDICARE ANNUAL WELLNESS VISIT, SUBSEQUENT: Primary | ICD-10-CM

## 2023-10-30 DIAGNOSIS — E66.09 CLASS 1 OBESITY DUE TO EXCESS CALORIES WITH SERIOUS COMORBIDITY AND BODY MASS INDEX (BMI) OF 32.0 TO 32.9 IN ADULT: ICD-10-CM

## 2023-10-30 DIAGNOSIS — M51.36 DEGENERATION OF INTERVERTEBRAL DISC OF LUMBAR SPINE WITHOUT DISC HERNIATION: ICD-10-CM

## 2023-10-30 DIAGNOSIS — E11.9 TYPE 2 DIABETES MELLITUS WITHOUT COMPLICATION, WITH LONG-TERM CURRENT USE OF INSULIN: ICD-10-CM

## 2023-10-30 DIAGNOSIS — I71.42 JUXTARENAL ABDOMINAL AORTIC ANEURYSM (AAA) WITHOUT RUPTURE: ICD-10-CM

## 2023-10-30 DIAGNOSIS — N40.1 BENIGN PROSTATIC HYPERPLASIA WITH URINARY OBSTRUCTION: ICD-10-CM

## 2023-10-30 LAB
BILIRUB BLD-MCNC: NEGATIVE MG/DL
CLARITY, POC: CLEAR
COLOR UR: ABNORMAL
GLUCOSE UR STRIP-MCNC: NEGATIVE MG/DL
KETONES UR QL: ABNORMAL
LEUKOCYTE EST, POC: NEGATIVE
NITRITE UR-MCNC: NEGATIVE MG/ML
PH UR: 5 [PH] (ref 5–8)
PROT UR STRIP-MCNC: ABNORMAL MG/DL
RBC # UR STRIP: NEGATIVE /UL
SP GR UR: 1.02 (ref 1–1.03)
UROBILINOGEN UR QL: NORMAL

## 2023-10-30 NOTE — PROGRESS NOTES
The ABCs of the Annual Wellness Visit  Subsequent Medicare Wellness Visit    Chief Complaint   Patient presents with    Medicare Wellness-subsequent    Allergies    Hyperlipidemia    Hypertension    Obesity       Subjective   History of Present Illness:  Herson Ruby is a 72 y.o. male who presents for a Subsequent Medicare Wellness Visit. The patient is here: for coordination of medical care to discuss health maintenance and disease prevention to follow up on multiple medical problems. Overall has: moderate activity with work/home activities, exercises 2 - 3 times per week, good appetite, feels well with minor complaints, decreased energy level, and is sleeping well. Nutrition: balanced diet and eating a variety of foods. Last tetanus shot was unknown.    HPI    HEALTH RISK ASSESSMENT    Recent Hospitalizations:  No hospitalization(s) within the last year.    Current Medical Providers:  Patient Care Team:  Nunu John MD as PCP - General (Family Medicine)  Chalino Ballard Sr., MD as Consulting Physician (Family Medicine)  Clinton Dorado MD as Consulting Physician (Urology)  Clinton Hugo MD as Consulting Physician (Allergy and Immunology)    Smoking Status:  Social History     Tobacco Use   Smoking Status Former    Packs/day: 2.00    Years: 30.00    Additional pack years: 0.00    Total pack years: 60.00    Types: Cigarettes    Start date:     Quit date:     Years since quittin.8   Smokeless Tobacco Never       Alcohol Consumption:  Social History     Substance and Sexual Activity   Alcohol Use Yes    Comment: on occasion       Depression Screen:       10/30/2023     1:00 PM   PHQ-2/PHQ-9 Depression Screening   Little Interest or Pleasure in Doing Things 1-->several days   Feeling Down, Depressed or Hopeless 0-->not at all   PHQ-9: Brief Depression Severity Measure Score 1       I spent less than 16 minutes asking patient questions, counseling and documenting in the chart    Fall  Risk Screen:  JAYADI Fall Risk Assessment was completed, and patient is at LOW risk for falls.Assessment completed on:10/30/2023    Health Habits and Functional and Cognitive Screening:      10/30/2023     1:00 PM   Functional & Cognitive Status   Do you have difficulty preparing food and eating? No   Do you have difficulty bathing yourself, getting dressed or grooming yourself? No   Do you have difficulty using the toilet? No   Do you have difficulty moving around from place to place? No   Do you have trouble with steps or getting out of a bed or a chair? No   Current Diet Well Balanced Diet   Dental Exam Not up to date   Eye Exam Up to date   Exercise (times per week) 4 times per week   Current Exercises Include Walking   Do you need help using the phone?  No   Are you deaf or do you have serious difficulty hearing?  No   Do you need help to go to places out of walking distance? No   Do you need help shopping? No   Do you need help preparing meals?  No   Do you need help with housework?  No   Do you need help with laundry? No   Do you need help taking your medications? No   Do you need help managing money? No   Do you ever drive or ride in a car without wearing a seat belt? No   Have you felt unusual stress, anger or loneliness in the last month? No   Who do you live with? Spouse   If you need help, do you have trouble finding someone available to you? No   Do you have difficulty concentrating, remembering or making decisions? No       Does the patient have evidence of cognitive impairment? No    Asprin use counseling:Taking ASA appropriately as indicated    Recent Lab Results:  Lab Results   Component Value Date    CHLPL 184 10/16/2023    TRIG 172 (H) 10/16/2023    HDL 65 10/16/2023    LDL 90 10/16/2023    VLDL 29 10/16/2023    HGBA1C 6.4 (A) 10/16/2023        Age-appropriate Screening Schedule:  Refer to the list below for future screening recommendations based on patient's age, sex and/or medical conditions.  "Orders for these recommended tests are listed in the plan section. The patient has been provided with a written plan.    Health Maintenance   Topic Date Due    ZOSTER VACCINE (2 of 3) 02/26/2013    DIABETIC EYE EXAM  10/03/2023    TDAP/TD VACCINES (1 - Tdap) 10/30/2024 (Originally 11/13/1969)    COVID-19 Vaccine (6 - 2023-24 season) 11/28/2023    DIABETIC FOOT EXAM  01/22/2024    HEMOGLOBIN A1C  04/16/2024    LIPID PANEL  10/16/2024    ANNUAL WELLNESS VISIT  10/30/2024    URINE MICROALBUMIN  10/30/2024    BMI FOLLOWUP  10/30/2024    COLORECTAL CANCER SCREENING  03/08/2032    HEPATITIS C SCREENING  Completed    INFLUENZA VACCINE  Completed    Pneumococcal Vaccine 65+  Completed    AAA SCREEN (ONE-TIME)  Completed          The following portions of the patient's history were reviewed and updated as appropriate: allergies, current medications, past family history, past medical history, past social history, past surgical history, and problem list.    Outpatient Medications Prior to Visit   Medication Sig Dispense Refill    albuterol sulfate  (90 Base) MCG/ACT inhaler Inhale 2 puffs Every 4 (Four) Hours As Needed for Wheezing.      ALLERGIST TRAY 1CC 26GX1/2\" 26G X 1/2\" 1 ML kit USE AS DIRECTED TO ADMINISTER ALLERGY INJECTIONS      amLODIPine (NORVASC) 10 MG tablet TAKE 1 TABLET BY MOUTH EVERY DAY FOR BLOOD PRESSURE 90 tablet 2    aspirin 81 MG EC tablet Take 1 tablet by mouth Daily.      dutasteride (AVODART) 0.5 MG capsule Take 1 capsule by mouth Daily. 90 capsule 3    EPINEPHrine (EPIPEN) 0.3 MG/0.3ML solution auto-injector injection INJECT INTRAMUSCULARLY IN CASE OF ANAPHYLAXIS      glucose blood (OneTouch Ultra) test strip 1 each by Other route Daily. Use as instructed 100 each 12    losartan (COZAAR) 50 MG tablet TAKE 1 TABLET BY MOUTH EVERY DAY 90 tablet 4    meloxicam (MOBIC) 15 MG tablet Take 1 tablet by mouth Daily. Every 2 to 3 days 90 tablet 3    metFORMIN (GLUCOPHAGE) 500 MG tablet TAKE 1 TABLET BY " MOUTH TWICE A  tablet 3    Multiple Vitamins-Minerals (CENTRUM SILVER 50+MEN PO) Take  by mouth.      Omega-3 Fatty Acids (FISH OIL) 1000 MG capsule capsule Take 1 capsule by mouth Daily With Breakfast.      OneTouch Delica Lancets 33G misc 1 each Daily. 30 each 12    simvastatin (ZOCOR) 80 MG tablet TAKE 1 TABLET BY MOUTH EVERY DAY AT NIGHT 90 tablet 3    vitamin C (ASCORBIC ACID) 500 MG tablet Take 1 tablet by mouth Daily.       No facility-administered medications prior to visit.       Patient Active Problem List   Diagnosis    Mixed hyperlipidemia    Adenoma of liver    History of ankle surgery    Type 2 diabetes mellitus without complication, with long-term current use of insulin    Actinic keratosis    Allergic rhinitis    Essential hypertension    Class 1 obesity due to excess calories with serious comorbidity and body mass index (BMI) of 32.0 to 32.9 in adult    Screening for colon cancer    Benign prostatic hyperplasia with urinary obstruction    Screening for prostate cancer    History of tobacco use    History of bladder cancer    Status post replacement of left shoulder joint    Degeneration of intervertebral disc of lumbar spine without disc herniation    Medicare annual wellness visit, subsequent    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 Patient has an advance directive in EMR which is still valid.     A face-to-face visit was completed today with patient.  Counseling explanation, and discussion of advanced directives was performed.   The last advanced care visit was performed in 2022.  In a near life ending situation, from which he is not expected to recover functionally, and he is not able to speak for him, he does not want life sustaining measures. We discussed feeding tubes, ventilators and cardiac support as well as dialysis.    I spent more than 16  "minutes discussing Advanced Care Planning information and documenting in the chart.    Review of Systems    I have reviewed and confirmed the accuracy of the HPI and ROS as documented by the MA/LPN/RN Nunu John MD    Compared to one year ago, the patient feels his physical health is the same.  Compared to one year ago, the patient feels his mental health is better.    Reviewed chart for potential of high risk medication in the elderly: yes  Reviewed chart for potential of harmful drug interactions in the elderly:yes    Objective      Vitals:    10/30/23 1321   BP: 126/78   BP Location: Right arm   Patient Position: Sitting   Cuff Size: Adult   Pulse: 75   Resp: 20   Temp: 97.7 °F (36.5 °C)   TempSrc: Infrared   SpO2: 97%   Weight: 101 kg (222 lb 12.8 oz)   Height: 175.3 cm (69\")   PainSc: 0-No pain       Body mass index is 32.9 kg/m².  Discussed the patient's BMI with him. The BMI is above average; BMI management plan is completed.      Assessment    Medicare Risks and Personalized Health Plan  CMS Preventative Services Quick Reference  Cardiovascular risk    The above risks/problems have been discussed with the patient.  Pertinent information has been shared with the patient in the After Visit Summary.  Follow up plans and orders are seen below in the Assessment/Plan Section.    Problem List Items Addressed This Visit          High    Type 2 diabetes mellitus without complication, with long-term current use of insulin    Overview     A1c 6.4 10/16/2023 controlled. BS remain 120  A1c 6.2 07/11/2023   A1c 6.2 04/07/2023  A1c 6.1 01/04/2023   A1c 6.0 03/18/2022   A1c 6.3 12/01/2021  A1c 6.1, 11/18/2020  A1c 5.9,  08/2020  Controlled.          Current Assessment & Plan     Diabetes is improving with treatment.   Continue current treatment regimen.  Diabetes will be reassessed in 3 months.         Relevant Orders    Microalbumin / Creatinine Urine Ratio - Urine, Clean Catch (Completed)    Essential hypertension "    Overview     Controlled. Compliant continue meds.          Current Assessment & Plan     Hypertension is unchanged.  Continue current treatment regimen.  Blood pressure will be reassessed in 3 months.         Juxtarenal abdominal aortic aneurysm (AAA) without rupture    Overview     2 cm on u/s 12/22, repeat prdered 1-/30/2023         Relevant Orders    [place] aaa screen limited    Centrilobular emphysema    Overview     Minimal sxs, abstains from cigarettes, rare use rescue inhaler.             Medium    Mixed hyperlipidemia    Overview     Controlled with Fish oil d/c atorvastatin for cost, simvastatin. Compliant           Current Assessment & Plan     Lipid abnormalities are improving with treatment.  Pharmacotherapy as ordered.  Lipids will be reassessed in 3 months.            Low    Allergic rhinitis    Overview     Controlled on ImmunoRx given.   He is compliant with meds and immunoRx.         Class 1 obesity due to excess calories with serious comorbidity and body mass index (BMI) of 32.0 to 32.9 in adult    Overview     Healthy diet and regular exercise discussed and  Encouraged          Current Assessment & Plan     Patient's (Body mass index is 32.9 kg/m².) indicates that they are obese (BMI >30) with health conditions that include hypertension, diabetes mellitus, and dyslipidemias . Weight is improving with lifestyle modifications. BMI  is above average; BMI management plan is completed. We discussed low calorie, low carb based diet program, portion control, and increasing exercise.          History of tobacco use    Overview     2 ppd x 30 yrs  Low dose CT 11/2019 10/2020 negative, 12/22 negative, repeat 23  He continues to abstain.          Relevant Orders     CT Chest Low Dose Cancer Screening WO (Completed)    Degeneration of intervertebral disc of lumbar spine without disc herniation    Overview     Sxs improved w/ rare anti inflammatories continued.             Unprioritized    Screening for colon  cancer    Overview     Colonoscopy 2022, repeat in 7 years         Benign prostatic hyperplasia with urinary obstruction    Overview     Followed with Ave. On meds compliant, sxs controlled..          Screening for prostate cancer    Overview       Lab Results   Component Value Date    PSA 1.0 10/16/2023    PSA 1.2 10/05/2022    PSA 2.2 09/01/2021             Medicare annual wellness visit, subsequent - Primary    Overview     Healthy diet and regular exercise. Seat belts, fall prevention and general safety discussed. Previous lab reviewed in detail.          Relevant Orders    POCT urinalysis dipstick, manual (Completed)     Follow Up:  Return in about 3 months (around 1/30/2024).    Site care done- cleaned with alcohol swab, procedure tolerated well, dressing applied. Venipuncture was obtained after 1 time(s). 2 tubes were drawn. Needle gauge used was 23-butterfly.    An After Visit Summary and PPPS were given to the patient.

## 2023-10-30 NOTE — PROGRESS NOTES
Chief Complaint  Medicare Wellness-subsequent, Allergies, Hyperlipidemia, Hypertension, and Obesity    Subjective     CC  Problem List  Visit Diagnosis   Encounters  Notes  Medications  Labs  Result Review Imaging  Media    Herson Ruby presents to [facility] for   History of Present Illness  Ishan is here today for his annual subsequent visit. Is having blurred vision but has lost his glasses and has an appointment to get new ones next week. States he does not have any complaints at this time.   Hypertension  This is a chronic problem. The current episode started more than 1 year ago. The problem has been waxing and waning since onset. The problem is uncontrolled. Associated symptoms include peripheral edema. Pertinent negatives include no anxiety, blurred vision, chest pain, headaches, malaise/fatigue, neck pain, orthopnea, palpitations, PND, shortness of breath or sweats. Risk factors for coronary artery disease include diabetes mellitus, dyslipidemia, male gender, family history and obesity. Current antihypertension treatment includes calcium channel blockers and angiotensin blockers. The current treatment provides mild improvement. There are no compliance problems.    Hyperlipidemia  This is a chronic problem. The current episode started more than 1 year ago. The problem is controlled. Recent lipid tests were reviewed and are normal. Exacerbating diseases include diabetes and obesity. He has no history of hypothyroidism. Pertinent negatives include no chest pain, focal sensory loss, focal weakness, leg pain, myalgias or shortness of breath. Current antihyperlipidemic treatment includes statins. The current treatment provides moderate improvement of lipids. There are no compliance problems.  Risk factors for coronary artery disease include dyslipidemia, diabetes mellitus, hypertension, family history, obesity and male sex.   Diabetes  He presents for his follow-up diabetic  visit. He has type 2 diabetes mellitus. His disease course has been stable. Pertinent negatives for hypoglycemia include no confusion, dizziness, headaches, hunger, mood changes, nervousness/anxiousness, pallor, seizures, sleepiness, speech difficulty, sweats or tremors. Pertinent negatives for diabetes include no blurred vision, no chest pain, no fatigue, no foot paresthesias, no foot ulcerations, no polydipsia, no polyphagia, no polyuria, no visual change, no weakness and no weight loss. There are no hypoglycemic complications. Symptoms are stable. There are no diabetic complications. Risk factors for coronary artery disease include family history. Current diabetic treatment includes oral agent (monotherapy). He is compliant with treatment all of the time. His weight is fluctuating minimally. He is following a diabetic and generally healthy diet. Meal planning includes avoidance of concentrated sweets. He has not had a previous visit with a dietitian. His overall blood glucose range is  mg/dl. (113) An ACE inhibitor/angiotensin II receptor blocker is not being taken. He does not see a podiatrist.Eye exam is current ([facility] in [place]).   COPD  There is no shortness of breath or wheezing. This is a chronic problem. The current episode started more than 1 year ago. The problem occurs rarely. The problem has been unchanged. Pertinent negatives include no chest pain, ear pain, fever, headaches, malaise/fatigue, myalgias, PND, rhinorrhea, sore throat, sweats, trouble swallowing or weight loss.       Review of Systems   Constitutional:  Negative for activity change, fatigue, fever, malaise/fatigue, unexpected weight gain and unexpected weight loss.   HENT:  Negative for congestion, ear pain, hearing loss, rhinorrhea, sinus pressure, sore throat, swollen glands, trouble swallowing and voice change.    Eyes:  Negative for blurred vision and visual disturbance.   Respiratory:  Negative for apnea,  "shortness of breath and wheezing.    Cardiovascular:  Negative for chest pain, palpitations, orthopnea, leg swelling and PND.   Gastrointestinal:  Negative for abdominal pain, blood in stool, constipation, diarrhea, nausea, vomiting and indigestion.   Endocrine: Negative for cold intolerance, heat intolerance, polydipsia, polyphagia and polyuria.   Genitourinary:  Negative for dysuria, flank pain, frequency, hematuria, testicular pain, urgency and urinary incontinence.   Musculoskeletal:  Negative for arthralgias, joint swelling, myalgias and neck pain.   Skin:  Negative for pallor, rash, skin lesions and wound.   Allergic/Immunologic: Negative for environmental allergies and immunocompromised state.   Neurological:  Negative for dizziness, tremors, focal weakness, seizures, syncope, speech difficulty, weakness, numbness, headache, memory problem and confusion.   Hematological:  Negative for adenopathy. Does not bruise/bleed easily.   Psychiatric/Behavioral:  Negative for suicidal ideas and depressed mood. The patient is not nervous/anxious.         Objective   Vital Signs:   /78 (BP Location: Right arm, Patient Position: Sitting, Cuff Size: Adult)   Pulse 75   Temp 97.7 °F (36.5 °C) (Infrared)   Resp 20   Ht 175.3 cm (69\")   Wt 101 kg (222 lb 12.8 oz)   SpO2 97% Comment: room air  BMI 32.90 kg/m²     Physical Exam  Constitutional:       General: He is not in acute distress.     Appearance: Normal appearance. He is well-developed.   HENT:      Head: Normocephalic.      Right Ear: Tympanic membrane and external ear normal.      Left Ear: Tympanic membrane and external ear normal.      Nose: No mucosal edema.      Mouth/Throat:      Mouth: No oral lesions.      Pharynx: No oropharyngeal exudate.   Eyes:      General: No scleral icterus.     Conjunctiva/sclera: Conjunctivae normal.      Pupils: Pupils are equal, round, and reactive to light.   Neck:      Thyroid: No thyromegaly.      Vascular: No JVD. "   Cardiovascular:      Rate and Rhythm: Normal rate and regular rhythm. No extrasystoles are present.     Pulses:           Carotid pulses are 2+ on the right side and 2+ on the left side.       Femoral pulses are 2+ on the right side and 2+ on the left side.       Dorsalis pedis pulses are 2+ on the right side and 2+ on the left side.      Heart sounds: Normal heart sounds. No murmur heard.     No friction rub. No gallop.   Pulmonary:      Effort: Pulmonary effort is normal.      Breath sounds: Normal breath sounds. No decreased breath sounds.   Abdominal:      General: Bowel sounds are normal. There is no distension.      Palpations: Abdomen is soft. There is no mass.      Tenderness: There is no abdominal tenderness.      Hernia: No hernia is present. There is no hernia in the left inguinal area.   Genitourinary:     Testes: Normal.   Musculoskeletal:         General: Deformity (knees and distal fingers with decreased ROM.) present. Normal range of motion.      Cervical back: Normal range of motion and neck supple. No edema.      Right lower leg: No edema.      Left lower leg: No edema.   Lymphadenopathy:      Head:      Right side of head: No submandibular adenopathy.      Left side of head: No submandibular adenopathy.      Cervical: No cervical adenopathy.      Upper Body:      Right upper body: No supraclavicular adenopathy.      Left upper body: No supraclavicular adenopathy.   Skin:     General: Skin is warm and dry.      Findings: No ecchymosis, erythema or rash.   Neurological:      Mental Status: He is alert and oriented to person, place, and time. He is not disoriented.      Cranial Nerves: No cranial nerve deficit.      Sensory: No sensory deficit.      Motor: No abnormal muscle tone.      Coordination: Coordination normal.      Deep Tendon Reflexes: Reflexes are normal and symmetric.   Psychiatric:         Speech: Speech normal.         Thought Content: Thought content normal.         Judgment:  Judgment normal.        Result Review :Labs  Result Review  Imaging  Med Tab  Media                 Assessment and Plan CC Problem List  Visit Diagnosis  ROS  Review (Popup)  Health Maintenance  Quality  BestPractice  Medications  SmartSets  SnapShot Encounters  Media  Problem List Items Addressed This Visit          High    Type 2 diabetes mellitus without complication, with long-term current use of insulin    Overview     A1c 6.4 10/16/2023 controlled. BS remain 120  A1c 6.2 07/11/2023   A1c 6.2 04/07/2023  A1c 6.1 01/04/2023   A1c 6.0 03/18/2022   A1c 6.3 12/01/2021  A1c 6.1, 11/18/2020  A1c 5.9,  08/2020  Controlled.          Current Assessment & Plan     Diabetes is improving with treatment.   Continue current treatment regimen.  Diabetes will be reassessed in 3 months.         Relevant Orders    Microalbumin / Creatinine Urine Ratio - Urine, Clean Catch (Completed)    Essential hypertension    Overview     Controlled. Compliant continue meds.          Current Assessment & Plan     Hypertension is unchanged.  Continue current treatment regimen.  Blood pressure will be reassessed in 3 months.         Juxtarenal abdominal aortic aneurysm (AAA) without rupture    Overview     2 cm on u/s 12/22, repeat prdered 1-/30/2023         Relevant Orders    [place] aaa screen limited    Centrilobular emphysema    Overview     Minimal sxs, abstains from cigarettes, rare use rescue inhaler.             Medium    Mixed hyperlipidemia    Overview     Controlled with Fish oil d/c atorvastatin for cost, simvastatin. Compliant           Current Assessment & Plan     Lipid abnormalities are improving with treatment.  Pharmacotherapy as ordered.  Lipids will be reassessed in 3 months.            Low    Allergic rhinitis    Overview     Controlled on ImmunoRx given.   He is compliant with meds and immunoRx.         Class 1 obesity due to excess calories with serious comorbidity and body mass index (BMI) of 32.0 to 32.9 in  adult    Overview     Healthy diet and regular exercise discussed and  Encouraged          Current Assessment & Plan     Patient's (Body mass index is 32.9 kg/m².) indicates that they are obese (BMI >30) with health conditions that include hypertension, diabetes mellitus, and dyslipidemias . Weight is improving with lifestyle modifications. BMI  is above average; BMI management plan is completed. We discussed low calorie, low carb based diet program, portion control, and increasing exercise.          History of tobacco use    Overview     2 ppd x 30 yrs  Low dose CT 11/2019 10/2020 negative, 12/22 negative, repeat 23  He continues to abstain.          Relevant Orders     CT Chest Low Dose Cancer Screening WO (Completed)    Degeneration of intervertebral disc of lumbar spine without disc herniation    Overview     Sxs improved w/ rare anti inflammatories continued.             Unprioritized    Screening for colon cancer    Overview     Colonoscopy 2022, repeat in 7 years         Benign prostatic hyperplasia with urinary obstruction    Overview     Followed with Ave. On meds compliant, sxs controlled..          Screening for prostate cancer    Overview       Lab Results   Component Value Date    PSA 1.0 10/16/2023    PSA 1.2 10/05/2022    PSA 2.2 09/01/2021             Medicare annual wellness visit, subsequent - Primary    Overview     Healthy diet and regular exercise. Seat belts, fall prevention and general safety discussed. Previous lab reviewed in detail.          Relevant Orders    POCT urinalysis dipstick, manual (Completed)       Follow Up Instructions Charge Capture  Follow-up Communications  Return in about 3 months (around 1/30/2024).  Patient was given instructions and counseling regarding his condition or for health maintenance advice. Please see specific information pulled into the AVS if appropriate.

## 2023-10-30 NOTE — ASSESSMENT & PLAN NOTE
Patient's (Body mass index is 32.9 kg/m².) indicates that they are obese (BMI >30) with health conditions that include hypertension, diabetes mellitus, and dyslipidemias . Weight is improving with lifestyle modifications. BMI  is above average; BMI management plan is completed. We discussed low calorie, low carb based diet program, portion control, and increasing exercise.

## 2023-10-31 LAB
ALBUMIN/CREAT UR: 3 MG/G CREAT (ref 0–29)
CREAT UR-MCNC: 239.9 MG/DL
MICROALBUMIN UR-MCNC: 7.7 UG/ML

## 2023-10-31 NOTE — PROGRESS NOTES
My [employer] Message sent  Good morning we have your microalbumin/creatinine urine ratio back and per Dr. John  Your microalbumin/creatinine urine ratio is normal, it does show good kidney function, and that you are not spilling protein into your urine. This is all good news. We will recheck this again in 1 year.

## 2023-11-10 ENCOUNTER — HOSPITAL ENCOUNTER (OUTPATIENT)
Dept: CT IMAGING | Facility: HOSPITAL | Age: 73
Discharge: HOME | End: 2023-11-10
Admitting: FAMILY MEDICINE
Payer: MEDICARE

## 2023-11-10 ENCOUNTER — HOSPITAL ENCOUNTER (OUTPATIENT)
Dept: ULTRASOUND IMAGING | Facility: HOSPITAL | Age: 73
Discharge: HOME | End: 2023-11-10
Payer: MEDICARE

## 2023-11-10 DIAGNOSIS — Z87.891 HISTORY OF TOBACCO USE: ICD-10-CM

## 2023-11-10 DIAGNOSIS — I71.42 JUXTARENAL ABDOMINAL AORTIC ANEURYSM (AAA) WITHOUT RUPTURE: ICD-10-CM

## 2023-11-10 PROCEDURE — 71271 CT THORAX LUNG CANCER SCR C-: CPT

## 2023-11-10 PROCEDURE — 76706 US ABDL AORTA SCREEN AAA: CPT

## 2023-11-10 NOTE — PROGRESS NOTES
MyChart message sent  Good afternoon we have your CT chest low dose back and per Dr. John  CT shows no suspicious pulmonary nodules, continue annual screening with CT chest low dose in a year.

## 2024-01-16 DIAGNOSIS — I10 ESSENTIAL HYPERTENSION: ICD-10-CM

## 2024-01-16 NOTE — PROGRESS NOTES
Chief Complaint  Diabetes, Hypertension, Hyperlipidemia, and Obesity    Subjective     CC  Problem List  Visit Diagnosis   Encounters  Notes  Medications  Labs  Result Review Imaging  Media    Herson Ruby presents to [facility] for   History of Present Illness  Ishan is here today for his 3 month Diabetes check.   Hypertension  This is a chronic problem. The current episode started more than 1 year ago. The problem has been waxing and waning since onset. The problem is uncontrolled. Associated symptoms include peripheral edema. Pertinent negatives include no anxiety, blurred vision, chest pain, headaches, malaise/fatigue, neck pain, orthopnea, palpitations, PND, shortness of breath or sweats. Risk factors for coronary artery disease include diabetes mellitus, dyslipidemia, male gender, family history and obesity. Current antihypertension treatment includes calcium channel blockers and angiotensin blockers. The current treatment provides mild improvement. There are no compliance problems.    Hyperlipidemia  This is a chronic problem. The current episode started more than 1 year ago. The problem is controlled. Recent lipid tests were reviewed and are normal. Exacerbating diseases include diabetes and obesity. He has no history of hypothyroidism. Pertinent negatives include no chest pain, focal sensory loss, focal weakness, leg pain, myalgias or shortness of breath. Current antihyperlipidemic treatment includes statins. The current treatment provides moderate improvement of lipids. There are no compliance problems.  Risk factors for coronary artery disease include dyslipidemia, diabetes mellitus, hypertension, family history, obesity and male sex.   Diabetes  He presents for his follow-up diabetic visit. He has type 2 diabetes mellitus. His disease course has been stable. Pertinent negatives for hypoglycemia include no confusion, headaches, hunger, mood changes,  nervousness/anxiousness, pallor, sleepiness, sweats or tremors. Pertinent negatives for diabetes include no blurred vision, no chest pain, no fatigue, no foot paresthesias, no foot ulcerations, no polydipsia, no polyuria, no visual change, no weakness and no weight loss. There are no hypoglycemic complications. Symptoms are stable. There are no diabetic complications. Risk factors for coronary artery disease include family history. Current diabetic treatment includes oral agent (monotherapy). He is compliant with treatment all of the time. His weight is fluctuating minimally. He is following a diabetic and generally healthy diet. Meal planning includes avoidance of concentrated sweets. He has not had a previous visit with a dietitian. His overall blood glucose range is  mg/dl. (Home glucose was 228 this am. ) An ACE inhibitor/angiotensin II receptor blocker is not being taken. He does not see a podiatrist.Eye exam is current ([facility] in [place]).   COPD  There is no shortness of breath or wheezing. This is a chronic problem. The current episode started more than 1 year ago. The problem occurs rarely. The problem has been unchanged. Pertinent negatives include no chest pain, ear pain, fever, headaches, malaise/fatigue, myalgias, PND, rhinorrhea, sore throat, sweats, trouble swallowing or weight loss.       Review of Systems   Constitutional:  Negative for fatigue, fever, malaise/fatigue and unexpected weight loss.   HENT:  Negative for ear pain, rhinorrhea, sore throat and trouble swallowing.    Eyes:  Negative for blurred vision.   Respiratory:  Negative for shortness of breath and wheezing.    Cardiovascular:  Negative for chest pain, palpitations, orthopnea, leg swelling and PND.   Gastrointestinal:  Negative for constipation, diarrhea, nausea and vomiting.   Endocrine: Negative for cold intolerance, heat intolerance, polydipsia and polyuria.   Genitourinary:  Negative for dysuria.  "  Musculoskeletal:  Negative for myalgias and neck pain.   Skin:  Negative for pallor.   Neurological:  Negative for tremors, focal weakness, weakness and confusion.   Hematological:  Negative for adenopathy. Does not bruise/bleed easily.   Psychiatric/Behavioral:  The patient is not nervous/anxious.         Objective   Vital Signs:   /80   Pulse 79   Temp 97.5 °F (36.4 °C) (Infrared)   Resp 24   Ht 175.3 cm (69\")   Wt 104 kg (230 lb 3.2 oz)   SpO2 97% Comment: room air  BMI 33.99 kg/m²     Physical Exam  Constitutional:       General: He is not in acute distress.     Appearance: Normal appearance. He is obese.   Neck:      Vascular: No carotid bruit.   Cardiovascular:      Rate and Rhythm: Normal rate and regular rhythm.      Pulses: Normal pulses.      Heart sounds: Normal heart sounds.   Pulmonary:      Effort: Pulmonary effort is normal.      Breath sounds: Normal breath sounds.   Musculoskeletal:      Cervical back: Neck supple.      Left lower leg: No edema.   Lymphadenopathy:      Cervical: No cervical adenopathy.   Skin:     Findings: No rash.   Neurological:      Mental Status: He is alert.        Result Review :Labs  Result Review  Imaging  Med Tab  Media                 Assessment and Plan CC Problem List  Visit Diagnosis  ROS  Review (Popup)  Health Maintenance  Quality  BestPractice  Medications  SmartSets  SnapShot Encounters  Media  Problem List Items Addressed This Visit          High    Type 2 diabetes mellitus without complication, with long-term current use of insulin - Primary    Overview     A1c 6.7 01/17/2024 worse  A1c 6.4 10/16/2023 controlled. BS remain 120  A1c 6.2 07/11/2023   A1c 6.2 04/07/2023  A1c 6.1 01/04/2023   A1c 6.0 03/18/2022   A1c 6.3 12/01/2021  A1c 6.1, 11/18/2020  A1c 5.9,  08/2020  Controlled.          Current Assessment & Plan     Diabetes is worsening.   Continue current treatment regimen.  Diabetes will be reassessed in 3 months.         " Relevant Orders    POC Glycosylated Hemoglobin (Hb A1C) (Completed)    POCT urinalysis dipstick, manual (Completed)    Essential hypertension    Overview     Controlled. Compliant continue meds.          Current Assessment & Plan     Hypertension is improving with treatment.  Continue current treatment regimen.  Dietary sodium restriction.  Weight loss.  Regular aerobic exercise.  Blood pressure will be reassessed in 3 months.         Centrilobular emphysema    Overview     Minimal sxs, abstains from cigarettes, rare use rescue inhaler            Medium    Mixed hyperlipidemia    Overview     Controlled with Fish oil d/c atorvastatin for cost, simvastatin. Compliant           Current Assessment & Plan     Lipid abnormalities are improving with treatment.  Pharmacotherapy as ordered.  Lipids will be reassessed in 3 months.            Low    Class 1 obesity due to excess calories with serious comorbidity and body mass index (BMI) of 34.0 to 34.9 in adult    Overview     Healthy diet and regular exercise discussed and  Encouraged          Current Assessment & Plan     Patient's (Body mass index is 33.99 kg/m².) indicates that they are obese (BMI >30) with health conditions that include hypertension, diabetes mellitus, and dyslipidemias . Weight is worsening. BMI  is above average; BMI management plan is completed. We discussed low calorie, low carb based diet program, portion control, and increasing exercise.             Follow Up Instructions Charge Capture  Follow-up Communications  Return in about 3 months (around 4/17/2024), or if symptoms worsen or fail to improve.  Patient was given instructions and counseling regarding his condition or for health maintenance advice. Please see specific information pulled into the AVS if appropriate.

## 2024-01-17 ENCOUNTER — OFFICE VISIT (OUTPATIENT)
Dept: FAMILY MEDICINE CLINIC | Facility: CLINIC | Age: 74
End: 2024-01-17
Payer: MEDICARE

## 2024-01-17 VITALS
TEMPERATURE: 97.5 F | SYSTOLIC BLOOD PRESSURE: 138 MMHG | RESPIRATION RATE: 24 BRPM | WEIGHT: 230.2 LBS | HEART RATE: 79 BPM | BODY MASS INDEX: 34.1 KG/M2 | HEIGHT: 69 IN | DIASTOLIC BLOOD PRESSURE: 80 MMHG | OXYGEN SATURATION: 97 %

## 2024-01-17 DIAGNOSIS — E78.2 MIXED HYPERLIPIDEMIA: ICD-10-CM

## 2024-01-17 DIAGNOSIS — Z79.4 TYPE 2 DIABETES MELLITUS WITHOUT COMPLICATION, WITH LONG-TERM CURRENT USE OF INSULIN: Primary | ICD-10-CM

## 2024-01-17 DIAGNOSIS — I10 ESSENTIAL HYPERTENSION: ICD-10-CM

## 2024-01-17 DIAGNOSIS — E66.09 CLASS 1 OBESITY DUE TO EXCESS CALORIES WITH SERIOUS COMORBIDITY AND BODY MASS INDEX (BMI) OF 34.0 TO 34.9 IN ADULT: ICD-10-CM

## 2024-01-17 DIAGNOSIS — E11.9 TYPE 2 DIABETES MELLITUS WITHOUT COMPLICATION, WITH LONG-TERM CURRENT USE OF INSULIN: Primary | ICD-10-CM

## 2024-01-17 DIAGNOSIS — J43.2 CENTRILOBULAR EMPHYSEMA: ICD-10-CM

## 2024-01-17 LAB
BILIRUB BLD-MCNC: NEGATIVE MG/DL
CLARITY, POC: CLEAR
COLOR UR: YELLOW
EXPIRATION DATE: ABNORMAL
GLUCOSE UR STRIP-MCNC: NEGATIVE MG/DL
HBA1C MFR BLD: 6.7 % (ref 4.5–5.7)
KETONES UR QL: NEGATIVE
LEUKOCYTE EST, POC: NEGATIVE
Lab: ABNORMAL
NITRITE UR-MCNC: NEGATIVE MG/ML
PH UR: 5 [PH] (ref 5–8)
PROT UR STRIP-MCNC: ABNORMAL MG/DL
RBC # UR STRIP: NEGATIVE /UL
SP GR UR: 1 (ref 1–1.03)
UROBILINOGEN UR QL: NORMAL

## 2024-01-17 RX ORDER — AMLODIPINE BESYLATE 10 MG/1
TABLET ORAL
Qty: 90 TABLET | Refills: 3 | Status: SHIPPED | OUTPATIENT
Start: 2024-01-17

## 2024-01-17 NOTE — ASSESSMENT & PLAN NOTE
Patient's (Body mass index is 33.99 kg/m².) indicates that they are obese (BMI >30) with health conditions that include hypertension, diabetes mellitus, and dyslipidemias . Weight is worsening. BMI  is above average; BMI management plan is completed. We discussed low calorie, low carb based diet program, portion control, and increasing exercise.

## 2024-02-09 NOTE — PROGRESS NOTES
Chief Complaint  Back Pain, Hyperlipidemia, Hypertension, and Blood in Urine    Subjective     CC  Problem List  Visit Diagnosis   Encounters  Notes  Medications  Labs  Result Review Imaging  Media    Herson Ruby presents to [facility] for   Back Pain  This is a new problem. The current episode started 1 to 4 weeks ago. The problem occurs constantly. The problem has been comes and goes since onset. The pain is present in the lumbar spine (and left flank. He has a hx of kidney stones). The quality of the pain is described as aching. The pain does not radiate. The pain is at a severity of 1/10. The pain is mild. The pain is Worse during the day. Stiffness is present All day. Pertinent negatives include no abdominal pain, bladder incontinence, bowel incontinence, chest pain, dysuria, fever, headaches, leg pain, numbness, paresis, paresthesias, pelvic pain, perianal numbness, tingling, weakness or weight loss. (Hematuria) Risk factors include history of cancer and obesity. He has tried NSAIDs (tylenol) for the symptoms. The treatment provided mild relief.   Hypertension  This is a chronic problem. The current episode started more than 1 year ago. The problem has been waxing and waning since onset. The problem is uncontrolled. Associated symptoms include peripheral edema. Pertinent negatives include no anxiety, blurred vision, chest pain, headaches or neck pain. Risk factors for coronary artery disease include diabetes mellitus, dyslipidemia, male gender, family history and obesity. Current antihypertension treatment includes calcium channel blockers and angiotensin blockers. The current treatment provides mild improvement. There are no compliance problems.    Diabetes  He presents for his follow-up diabetic visit. He has type 2 diabetes mellitus. His disease course has been stable. Pertinent negatives for hypoglycemia include no confusion, headaches, hunger, mood changes,  nervousness/anxiousness, pallor, sleepiness or tremors. Pertinent negatives for diabetes include no blurred vision, no chest pain, no fatigue, no foot paresthesias, no foot ulcerations, no polydipsia, no polyuria, no visual change, no weakness and no weight loss. There are no hypoglycemic complications. Symptoms are stable. There are no diabetic complications. Risk factors for coronary artery disease include family history. Current diabetic treatment includes oral agent (monotherapy). He is compliant with treatment all of the time. His weight is fluctuating minimally. He is following a diabetic and generally healthy diet. Meal planning includes avoidance of concentrated sweets. He has not had a previous visit with a dietitian. His breakfast blood glucose is taken between 7-8 am. His breakfast blood glucose range is generally 110-130 mg/dl. His overall blood glucose range is  mg/dl. (Home glucose was 116 this morning. ) An ACE inhibitor/angiotensin II receptor blocker is not being taken. He does not see a podiatrist.Eye exam is current ([facility] in [place]).       Review of Systems   Constitutional:  Negative for fatigue, fever and unexpected weight loss.   Eyes:  Negative for blurred vision.   Cardiovascular:  Negative for chest pain.   Gastrointestinal:  Negative for abdominal pain and bowel incontinence.   Endocrine: Negative for cold intolerance, heat intolerance, polydipsia and polyuria.   Genitourinary:  Negative for dysuria, genital sores, pelvic pain, urgency and urinary incontinence.   Musculoskeletal:  Positive for back pain. Negative for neck pain.   Skin:  Negative for pallor.   Neurological:  Negative for tingling, tremors, weakness, numbness, paresthesias and confusion.   Hematological:  Negative for adenopathy. Does not bruise/bleed easily.   Psychiatric/Behavioral:  The patient is not nervous/anxious.         Objective   Vital Signs:   /72 (BP Location: Right arm, Patient  "Position: Sitting, Cuff Size: Adult)   Pulse 71   Temp 97.3 °F (36.3 °C) (Temporal)   Resp 18   Ht 176.5 cm (69.5\")   Wt 104 kg (229 lb 6 oz)   SpO2 96% Comment: room air  BMI 33.39 kg/m²     Physical Exam  Constitutional:       General: He is not in acute distress.  Cardiovascular:      Rate and Rhythm: Normal rate and regular rhythm.      Heart sounds: No murmur heard.  Pulmonary:      Effort: Pulmonary effort is normal.      Breath sounds: Normal breath sounds.   Abdominal:      General: Abdomen is flat. Bowel sounds are normal.      Palpations: Abdomen is soft.      Tenderness: There is left CVA tenderness. There is no right CVA tenderness.   Musculoskeletal:      Cervical back: Neck supple.      Lumbar back: No swelling, tenderness or bony tenderness. Decreased range of motion. Negative right straight leg raise test and negative left straight leg raise test.      Right lower leg: No edema.      Left lower leg: No edema.   Lymphadenopathy:      Cervical: No cervical adenopathy.   Neurological:      General: No focal deficit present.      Mental Status: He is alert and oriented to person, place, and time.      Sensory: No sensory deficit.      Motor: No weakness.      Gait: Gait normal.        Result Review :Labs  Result Review  Imaging  Med Tab  Media                 Assessment and Plan CC Problem List  Visit Diagnosis  ROS  Review (Popup)  Health Maintenance  Quality  BestPractice  Medications  SmartSets  SnapShot Encounters  Media  Problem List Items Addressed This Visit          High    Type 2 diabetes mellitus without complication, with long-term current use of insulin    Overview     A1c 6.7 01/17/2024 worse but controlled. He reports BS of 130 at home.   A1c 6.4 10/16/2023 controlled. BS remain 120  A1c 6.2 07/11/2023   A1c 6.2 04/07/2023  A1c 6.1 01/04/2023   A1c 6.0 03/18/2022   A1c 6.3 12/01/2021  A1c 6.1, 11/18/2020  A1c 5.9,  08/2020  Controlled.          Essential hypertension "    Overview     Controlled. Compliant continue meds            Low    Class 1 obesity due to excess calories with serious comorbidity and body mass index (BMI) of 33.0 to 33.9 in adult    Overview     Healthy diet and regular exercise discussed and  Encouraged          Current Assessment & Plan     Patient's (Body mass index is 33.39 kg/m².) indicates that they are obese (BMI >30) with health conditions that include hypertension, diabetes mellitus, and dyslipidemias . Weight is unchanged. BMI  is above average; BMI management plan is completed. We discussed portion control and increasing exercise         History of tobacco use    Overview     2 ppd x 30 yrs  Low dose CT 11/2019 10/2020 negative, 12/22 negative, repeat 23  He continues to abstain.          Degeneration of intervertebral disc of lumbar spine without disc herniation    Overview     Moderate to severe DJD of lower back.   Sxs improved w/ rare anti inflammatories continued.             Unprioritized    History of bladder cancer    Overview     2002, stopped smoking.   Followed with [facility]. Last seen 10/2020, last cystoscopy 9/23         Relevant Orders    Urine Culture - Urine, Urine, Random Void (Completed)     Other Visit Diagnoses       Left flank pain    -  Primary    hx of stones. R/o stone presently, Neg Flat plate, neg CT with stone protochol negative. urine cx r/o infection    Relevant Orders    POCT urinalysis dipstick, manual (Completed)    XR Abdomen KUB (Completed)    CT Abdomen Pelvis Stone Protocol (Completed)    Urine Culture - Urine, Urine, Random Void (Completed)    Low back pain, unspecified back pain laterality, unspecified chronicity, unspecified whether sciatica present        Relevant Orders    Urine Culture - Urine, Urine, Random Void (Completed)            Follow Up Instructions Charge Capture  Follow-up Communications  Return in about 2 months (around 4/12/2024), or if symptoms worsen or fail to improve.  Patient was given  instructions and counseling regarding his condition or for health maintenance advice. Please see specific information pulled into the AVS if appropriate.

## 2024-02-12 ENCOUNTER — OFFICE VISIT (OUTPATIENT)
Dept: FAMILY MEDICINE CLINIC | Facility: CLINIC | Age: 74
End: 2024-02-12
Payer: MEDICARE

## 2024-02-12 ENCOUNTER — HOSPITAL ENCOUNTER (OUTPATIENT)
Dept: CT IMAGING | Facility: HOSPITAL | Age: 74
Discharge: HOME | End: 2024-02-12
Payer: MEDICARE

## 2024-02-12 ENCOUNTER — HOSPITAL ENCOUNTER (OUTPATIENT)
Dept: GENERAL RADIOLOGY | Facility: HOSPITAL | Age: 74
Discharge: HOME | End: 2024-02-12
Payer: MEDICARE

## 2024-02-12 VITALS
HEIGHT: 70 IN | HEART RATE: 71 BPM | RESPIRATION RATE: 18 BRPM | SYSTOLIC BLOOD PRESSURE: 132 MMHG | TEMPERATURE: 97.3 F | OXYGEN SATURATION: 96 % | BODY MASS INDEX: 32.84 KG/M2 | WEIGHT: 229.38 LBS | DIASTOLIC BLOOD PRESSURE: 72 MMHG

## 2024-02-12 DIAGNOSIS — M54.50 LOW BACK PAIN, UNSPECIFIED BACK PAIN LATERALITY, UNSPECIFIED CHRONICITY, UNSPECIFIED WHETHER SCIATICA PRESENT: ICD-10-CM

## 2024-02-12 DIAGNOSIS — R10.9 LEFT FLANK PAIN: Primary | ICD-10-CM

## 2024-02-12 DIAGNOSIS — Z87.891 HISTORY OF TOBACCO USE: ICD-10-CM

## 2024-02-12 DIAGNOSIS — E66.09 CLASS 1 OBESITY DUE TO EXCESS CALORIES WITH SERIOUS COMORBIDITY AND BODY MASS INDEX (BMI) OF 33.0 TO 33.9 IN ADULT: ICD-10-CM

## 2024-02-12 DIAGNOSIS — I10 ESSENTIAL HYPERTENSION: ICD-10-CM

## 2024-02-12 DIAGNOSIS — R10.9 LEFT FLANK PAIN: ICD-10-CM

## 2024-02-12 DIAGNOSIS — Z85.51 HISTORY OF BLADDER CANCER: ICD-10-CM

## 2024-02-12 DIAGNOSIS — M51.36 DEGENERATION OF INTERVERTEBRAL DISC OF LUMBAR SPINE WITHOUT DISC HERNIATION: ICD-10-CM

## 2024-02-12 DIAGNOSIS — E11.9 TYPE 2 DIABETES MELLITUS WITHOUT COMPLICATION, WITH LONG-TERM CURRENT USE OF INSULIN: ICD-10-CM

## 2024-02-12 DIAGNOSIS — Z79.4 TYPE 2 DIABETES MELLITUS WITHOUT COMPLICATION, WITH LONG-TERM CURRENT USE OF INSULIN: ICD-10-CM

## 2024-02-12 LAB
BILIRUB BLD-MCNC: NEGATIVE MG/DL
CLARITY, POC: CLEAR
COLOR UR: ABNORMAL
GLUCOSE UR STRIP-MCNC: NEGATIVE MG/DL
KETONES UR QL: NEGATIVE
LEUKOCYTE EST, POC: NEGATIVE
NITRITE UR-MCNC: NEGATIVE MG/ML
PH UR: 5 [PH] (ref 5–8)
PROT UR STRIP-MCNC: ABNORMAL MG/DL
RBC # UR STRIP: NEGATIVE /UL
SP GR UR: 1.02 (ref 1–1.03)
UROBILINOGEN UR QL: NORMAL

## 2024-02-12 PROCEDURE — 74176 CT ABD & PELVIS W/O CONTRAST: CPT

## 2024-02-12 PROCEDURE — 74018 RADEX ABDOMEN 1 VIEW: CPT

## 2024-02-12 NOTE — ASSESSMENT & PLAN NOTE
Patient's (Body mass index is 33.39 kg/m².) indicates that they are obese (BMI >30) with health conditions that include hypertension, diabetes mellitus, and dyslipidemias . Weight is unchanged. BMI  is above average; BMI management plan is completed. We discussed portion control and increasing exercise

## 2024-02-14 LAB
BACTERIA UR CULT: NORMAL
BACTERIA UR CULT: NORMAL

## 2024-02-14 NOTE — PROGRESS NOTES
Call Patient  Good morning we have your urine culture back and per Dr. John   Your urine culture did not grow any bacteria.

## 2024-03-22 RX ORDER — DUTASTERIDE 0.5 MG/1
0.5 CAPSULE, LIQUID FILLED ORAL DAILY
Qty: 90 CAPSULE | Refills: 3 | Status: SHIPPED | OUTPATIENT
Start: 2024-03-22

## 2024-04-23 NOTE — PROGRESS NOTES
Chief Complaint  Diabetes and Hypertension    Subjective     CC  Problem List  Visit Diagnosis   Encounters  Notes  Medications  Labs  Result Review Imaging  Media    Herson Ruby presents to [facility] for   Diabetes  He presents for his follow-up diabetic visit. He has type 2 diabetes mellitus. His disease course has been worsening. Pertinent negatives for hypoglycemia include no confusion, dizziness, headaches, hunger, mood changes, nervousness/anxiousness, seizures, sleepiness, sweats or tremors. Pertinent negatives for diabetes include no blurred vision, no chest pain, no fatigue, no foot paresthesias, no foot ulcerations, no polydipsia, no polyuria, no visual change, no weakness and no weight loss. Pertinent negatives for hypoglycemia complications include no blackouts, no hospitalization, no nocturnal hypoglycemia, no required assistance and no required glucagon injection. Symptoms are stable. Risk factors for coronary artery disease include diabetes mellitus, dyslipidemia, male sex, hypertension and tobacco exposure. Current diabetic treatment includes oral agent (monotherapy) (Metformin). He is compliant with treatment most of the time. His weight is stable. He is following a diabetic and generally healthy diet. When asked about meal planning, he reported none. He has not had a previous visit with a dietitian. His breakfast blood glucose is taken between 7-8 am. His breakfast blood glucose range is generally 110-130 mg/dl. (Blood sugar this ) He does not see a podiatrist. Eye exam is current.   Hypertension  This is a chronic problem. The current episode started more than 1 year ago. The problem has been stable since onset. The problem is controlled. Pertinent negatives include no anxiety, blurred vision, chest pain, headaches, malaise/fatigue, neck pain, orthopnea, palpitations, peripheral edema, PND, shortness of breath or sweats. Risk factors for coronary  "artery disease include diabetes mellitus, dyslipidemia, male gender and smoking/tobacco exposure. Past treatments include nothing. Current antihypertension treatment includes angiotensin blockers and calcium channel blockers. The current treatment provides mild improvement. There are no compliance problems.        Review of Systems   Constitutional:  Negative for fatigue, malaise/fatigue and unexpected weight loss.   Eyes:  Negative for blurred vision.   Respiratory:  Negative for shortness of breath.    Cardiovascular:  Negative for chest pain, palpitations, orthopnea and PND.   Gastrointestinal:  Negative for abdominal pain, constipation, diarrhea, nausea and vomiting.   Endocrine: Negative for cold intolerance, heat intolerance, polydipsia and polyuria.   Genitourinary:  Negative for dysuria.   Musculoskeletal:  Negative for neck pain.   Skin:  Negative for rash.   Neurological:  Negative for dizziness, tremors, seizures, weakness, numbness and confusion.   Psychiatric/Behavioral:  The patient is not nervous/anxious.         Objective   Vital Signs:   /70 (BP Location: Left arm, Patient Position: Sitting, Cuff Size: Adult)   Pulse (!) 47   Temp 97.8 °F (36.6 °C) (Temporal)   Resp 18   Ht 176.5 cm (69.5\")   Wt 102 kg (225 lb 8 oz)   SpO2 96% Comment: room air  BMI 32.82 kg/m²     Physical Exam  Constitutional:       General: He is not in acute distress.     Appearance: Normal appearance. He is obese.   Neck:      Vascular: No carotid bruit.   Cardiovascular:      Rate and Rhythm: Normal rate and regular rhythm.      Pulses: Normal pulses.      Heart sounds: Normal heart sounds.   Pulmonary:      Effort: Pulmonary effort is normal.      Breath sounds: Normal breath sounds.   Musculoskeletal:      Cervical back: Neck supple.      Left lower leg: No edema.   Lymphadenopathy:      Cervical: No cervical adenopathy.   Skin:     Findings: No rash.   Neurological:      Mental Status: He is alert.      "   Result Review :Labs  Result Review  Imaging  Med Tab  Media                 Assessment and Plan CC Problem List  Visit Diagnosis  ROS  Review (Popup)  Health Maintenance  Quality  BestPractice  Medications  SmartSets  SnapShot Encounters  Media  Problem List Items Addressed This Visit          High    Type 2 diabetes mellitus without complication, with long-term current use of insulin - Primary    Overview     A1c 6.7 04/29/2024 stable and well controlled.   A1c 6.7 01/17/2024 worse    A1c 6.4 10/16/2023 controlled.   A1c 6.2 07/11/2023   A1c 6.2 04/07/2023  A1c 6.1 01/04/2023   A1c 6.0 03/18/2022   A1c 6.3 12/01/2021  A1c 6.1, 11/18/2020  A1c 5.9,  08/2020  Controlled.          Current Assessment & Plan     Diabetes is stable.   Continue current treatment regimen.  Diabetes will be reassessed in 3 months         Relevant Medications    glucose blood (OneTouch Ultra) test strip    Other Relevant Orders    POC Glycosylated Hemoglobin (Hb A1C) (Completed)    POCT urinalysis dipstick, manual (Completed)    Essential hypertension    Overview     Controlled. Compliant continue meds         Current Assessment & Plan     Hypertension is stable and controlled  Continue current treatment regimen.  Blood pressure will be reassessed in 3 months.         Centrilobular emphysema    Overview     Minimal sxs, abstains from cigarettes, rare use rescue inhaler, continued          Relevant Medications    ipratropium (ATROVENT) 0.06 % nasal spray       Low    Class 1 obesity due to excess calories with serious comorbidity and body mass index (BMI) of 33.0 to 33.9 in adult    Overview     Healthy diet and regular exercise discussed and  Encouraged          Current Assessment & Plan     Patient's (Body mass index is 32.82 kg/m².) indicates that they are obese (BMI >30) with health conditions that include hypertension, diabetes mellitus, and dyslipidemias . Weight is unchanged. BMI  is above average; BMI management plan  is completed. We discussed portion control and increasing exercise.          History of tobacco use    Overview     2 ppd x 30 yrs  Low dose CT 11/2019 10/2020 negative, 12/22 negative, repeat 23  He continues to abstain.             Follow Up Instructions Charge Capture  Follow-up Communications  Return in about 3 months (around 7/29/2024), or if symptoms worsen or fail to improve.  Patient was given instructions and counseling regarding his condition or for health maintenance advice. Please see specific information pulled into the AVS if appropriate.   Answers submitted by the patient for this visit:  Primary Reason for Visit (Submitted on 4/22/2024)  What is the primary reason for your visit?: Diabetes

## 2024-04-29 ENCOUNTER — OFFICE VISIT (OUTPATIENT)
Dept: FAMILY MEDICINE CLINIC | Facility: CLINIC | Age: 74
End: 2024-04-29
Payer: MEDICARE

## 2024-04-29 VITALS
HEIGHT: 70 IN | DIASTOLIC BLOOD PRESSURE: 70 MMHG | BODY MASS INDEX: 32.28 KG/M2 | WEIGHT: 225.5 LBS | HEART RATE: 47 BPM | OXYGEN SATURATION: 96 % | TEMPERATURE: 97.8 F | RESPIRATION RATE: 18 BRPM | SYSTOLIC BLOOD PRESSURE: 132 MMHG

## 2024-04-29 DIAGNOSIS — E11.9 TYPE 2 DIABETES MELLITUS WITHOUT COMPLICATION, WITH LONG-TERM CURRENT USE OF INSULIN: Primary | ICD-10-CM

## 2024-04-29 DIAGNOSIS — J43.2 CENTRILOBULAR EMPHYSEMA: ICD-10-CM

## 2024-04-29 DIAGNOSIS — E66.09 CLASS 1 OBESITY DUE TO EXCESS CALORIES WITH SERIOUS COMORBIDITY AND BODY MASS INDEX (BMI) OF 33.0 TO 33.9 IN ADULT: ICD-10-CM

## 2024-04-29 DIAGNOSIS — I10 ESSENTIAL HYPERTENSION: ICD-10-CM

## 2024-04-29 DIAGNOSIS — Z87.891 HISTORY OF TOBACCO USE: ICD-10-CM

## 2024-04-29 DIAGNOSIS — Z79.4 TYPE 2 DIABETES MELLITUS WITHOUT COMPLICATION, WITH LONG-TERM CURRENT USE OF INSULIN: Primary | ICD-10-CM

## 2024-04-29 LAB
BILIRUB BLD-MCNC: NEGATIVE MG/DL
CLARITY, POC: CLEAR
COLOR UR: YELLOW
EXPIRATION DATE: ABNORMAL
GLUCOSE UR STRIP-MCNC: NEGATIVE MG/DL
HBA1C MFR BLD: 6.7 % (ref 4.5–5.7)
KETONES UR QL: NEGATIVE
LEUKOCYTE EST, POC: NEGATIVE
Lab: ABNORMAL
NITRITE UR-MCNC: NEGATIVE MG/ML
PH UR: 6.5 [PH] (ref 5–8)
PROT UR STRIP-MCNC: NEGATIVE MG/DL
RBC # UR STRIP: NEGATIVE /UL
SP GR UR: 1 (ref 1–1.03)
UROBILINOGEN UR QL: NORMAL

## 2024-04-29 PROCEDURE — 1160F RVW MEDS BY RX/DR IN RCRD: CPT | Performed by: FAMILY MEDICINE

## 2024-04-29 PROCEDURE — 83036 HEMOGLOBIN GLYCOSYLATED A1C: CPT | Performed by: FAMILY MEDICINE

## 2024-04-29 PROCEDURE — 81002 URINALYSIS NONAUTO W/O SCOPE: CPT | Performed by: FAMILY MEDICINE

## 2024-04-29 PROCEDURE — 3075F SYST BP GE 130 - 139MM HG: CPT | Performed by: FAMILY MEDICINE

## 2024-04-29 PROCEDURE — 3078F DIAST BP <80 MM HG: CPT | Performed by: FAMILY MEDICINE

## 2024-04-29 PROCEDURE — 3044F HG A1C LEVEL LT 7.0%: CPT | Performed by: FAMILY MEDICINE

## 2024-04-29 PROCEDURE — 99214 OFFICE O/P EST MOD 30 MIN: CPT | Performed by: FAMILY MEDICINE

## 2024-04-29 PROCEDURE — 1159F MED LIST DOCD IN RCRD: CPT | Performed by: FAMILY MEDICINE

## 2024-04-29 RX ORDER — BLOOD SUGAR DIAGNOSTIC
1 STRIP MISCELLANEOUS DAILY
Qty: 100 EACH | Refills: 12 | Status: SHIPPED | OUTPATIENT
Start: 2024-04-29

## 2024-04-29 RX ORDER — IPRATROPIUM BROMIDE 42 UG/1
2 SPRAY, METERED NASAL 2 TIMES DAILY
COMMUNITY
Start: 2024-01-17

## 2024-04-29 NOTE — ASSESSMENT & PLAN NOTE
Patient's (Body mass index is 32.82 kg/m².) indicates that they are obese (BMI >30) with health conditions that include hypertension, diabetes mellitus, and dyslipidemias . Weight is unchanged. BMI  is above average; BMI management plan is completed. We discussed portion control and increasing exercise.

## 2024-05-22 DIAGNOSIS — E78.2 MIXED HYPERLIPIDEMIA: ICD-10-CM

## 2024-05-22 RX ORDER — SIMVASTATIN 80 MG
80 TABLET ORAL NIGHTLY
Qty: 90 TABLET | Refills: 3 | Status: SHIPPED | OUTPATIENT
Start: 2024-05-22

## 2024-07-22 NOTE — PROGRESS NOTES
Chief Complaint  No chief complaint on file.    Subjective     CC  Problem List  Visit Diagnosis   Encounters  Notes  Medications  Labs  Result Review Imaging  Media    Herson Ruby presents to [facility] for   History of Present Illness  Donate blood:   He states that he has tried a couple times to donate blood and he states that he has been turned away now twice as he states that they tell him that his heart misses a beat here and there and it is more than they can allow for them to be able to approve him to donate blood. He reports no issues and no feeling of this. He states that he is not followed by cardiology.   Diabetes  He presents for his follow-up diabetic visit. He has type 2 diabetes mellitus. His disease course has been worsening. Pertinent negatives for hypoglycemia include no confusion, dizziness, headaches, hunger, mood changes, nervousness/anxiousness, seizures, sleepiness, sweats or tremors. Pertinent negatives for diabetes include no blurred vision, no chest pain, no fatigue, no foot paresthesias, no foot ulcerations, no polydipsia, no polyuria, no visual change, no weakness and no weight loss. Pertinent negatives for hypoglycemia complications include no blackouts, no hospitalization, no nocturnal hypoglycemia, no required assistance and no required glucagon injection. Symptoms are stable. Risk factors for coronary artery disease include diabetes mellitus, dyslipidemia, male sex, hypertension and tobacco exposure. Current diabetic treatment includes oral agent (monotherapy) (Metformin). He is compliant with treatment most of the time. His weight is stable. He is following a diabetic and generally healthy diet. When asked about meal planning, he reported none. He has not had a previous visit with a dietitian. His breakfast blood glucose is taken between 7-8 am. His breakfast blood glucose range is generally 110-130 mg/dl. (Blood sugar this   He  states that he has a nose spray that he has to use from the ENT and he states that it raises his blood sugar up to 180's and he states that he stopped it and he staets that they are back to normal. ) He does not see a podiatrist. Eye exam is current.   Hypertension  This is a chronic problem. The current episode started more than 1 year ago. The problem has been stable since onset. The problem is controlled. Associated symptoms include palpitations. Pertinent negatives include no anxiety, blurred vision, chest pain, headaches, malaise/fatigue, neck pain, orthopnea, peripheral edema, PND, shortness of breath or sweats. Risk factors for coronary artery disease include diabetes mellitus, dyslipidemia, male gender and smoking/tobacco exposure. Past treatments include nothing. Current antihypertension treatment includes angiotensin blockers and calcium channel blockers. The current treatment provides mild improvement. There are no compliance problems.    Hyperlipidemia  This is a chronic problem. The current episode started more than 1 year ago. Exacerbating diseases include diabetes and obesity. Factors aggravating his hyperlipidemia include fatty foods. Pertinent negatives include no chest pain or shortness of breath. Current antihyperlipidemic treatment includes herbal therapy and statins. The current treatment provides moderate improvement of lipids. There are no compliance problems.  Risk factors for coronary artery disease include male sex, hypertension, diabetes mellitus, dyslipidemia and obesity.       Review of Systems   Constitutional:  Negative for fatigue, malaise/fatigue and unexpected weight loss.   Eyes:  Negative for blurred vision.   Respiratory:  Negative for shortness of breath.    Cardiovascular:  Positive for palpitations. Negative for chest pain, orthopnea, leg swelling and PND.   Gastrointestinal:  Negative for abdominal pain, constipation, diarrhea, nausea and vomiting.   Endocrine: Negative for  polydipsia and polyuria.   Genitourinary:  Negative for dysuria.   Musculoskeletal:  Negative for neck pain.   Skin:  Negative for rash.   Neurological:  Negative for dizziness, tremors, seizures, weakness and confusion.   Hematological:  Negative for adenopathy. Does not bruise/bleed easily.   Psychiatric/Behavioral:  The patient is not nervous/anxious.       Objective   Vital Signs:   There were no vitals taken for this visit.    Physical Exam  Constitutional:       General: He is not in acute distress.     Appearance: Normal appearance. He is obese.   Neck:      Vascular: No carotid bruit.   Cardiovascular:      Rate and Rhythm: Normal rate and regular rhythm.      Pulses: Normal pulses.      Heart sounds: Normal heart sounds.      Comments: There is ectopy about every 10 beats.   Pulmonary:      Effort: Pulmonary effort is normal.      Breath sounds: Normal breath sounds.   Musculoskeletal:      Cervical back: Neck supple.      Left lower leg: No edema.   Feet:      Right foot:      Protective Sensation: 10 sites tested.  10 sites sensed.      Skin integrity: Callus present.      Left foot:      Protective Sensation: 10 sites tested.  10 sites sensed.      Skin integrity: Callus present.   Lymphadenopathy:      Cervical: No cervical adenopathy.   Skin:     Findings: No rash.   Neurological:      Mental Status: He is alert.   Psychiatric:         Mood and Affect: Mood normal.      Result Review :Labs  Result Review  Imaging  Med Tab  Media            ECG 12 Lead    Date/Time: 7/29/2024 2:48 PM  Performed by: Nunu John MD    Authorized by: Nunu John MD  Comparison: not compared with previous ECG   Previous ECG: no previous ECG available  Rhythm: sinus bradycardia  Ectopy: unifocal PVCs  Rate: bradycardic  BPM: 52  Conduction: 1st degree AV block  ST Segments: ST segments normal  T Waves: T waves normal    Clinical impression: abnormal EKG       Diabetic Foot Exam Performed and Monofilament Test  Performed   Assessment and Plan CC Problem List  Visit Diagnosis  ROS  Review (Popup)  Health Maintenance  Quality  BestPractice  Medications  SmartSets  SnapShot Encounters  Media  Problem List Items Addressed This Visit    None      Follow Up Instructions Charge Capture  Follow-up Communications  No follow-ups on file.  Patient was given instructions and counseling regarding his condition or for health maintenance advice. Please see specific information pulled into the AVS if appropriate.

## 2024-07-29 ENCOUNTER — OFFICE VISIT (OUTPATIENT)
Dept: FAMILY MEDICINE CLINIC | Facility: CLINIC | Age: 74
End: 2024-07-29
Payer: MEDICARE

## 2024-07-29 VITALS
HEIGHT: 69 IN | RESPIRATION RATE: 18 BRPM | HEART RATE: 76 BPM | DIASTOLIC BLOOD PRESSURE: 70 MMHG | OXYGEN SATURATION: 98 % | TEMPERATURE: 98.2 F | WEIGHT: 226.2 LBS | SYSTOLIC BLOOD PRESSURE: 136 MMHG | BODY MASS INDEX: 33.5 KG/M2

## 2024-07-29 DIAGNOSIS — E11.9 TYPE 2 DIABETES MELLITUS WITHOUT COMPLICATION, WITH LONG-TERM CURRENT USE OF INSULIN: Primary | ICD-10-CM

## 2024-07-29 DIAGNOSIS — E66.09 CLASS 1 OBESITY DUE TO EXCESS CALORIES WITH SERIOUS COMORBIDITY AND BODY MASS INDEX (BMI) OF 33.0 TO 33.9 IN ADULT: ICD-10-CM

## 2024-07-29 DIAGNOSIS — I10 ESSENTIAL HYPERTENSION: ICD-10-CM

## 2024-07-29 DIAGNOSIS — E78.2 MIXED HYPERLIPIDEMIA: ICD-10-CM

## 2024-07-29 DIAGNOSIS — Z87.891 HISTORY OF TOBACCO USE: ICD-10-CM

## 2024-07-29 DIAGNOSIS — Z79.4 TYPE 2 DIABETES MELLITUS WITHOUT COMPLICATION, WITH LONG-TERM CURRENT USE OF INSULIN: Primary | ICD-10-CM

## 2024-07-29 DIAGNOSIS — I49.3 VENTRICULAR ECTOPY: ICD-10-CM

## 2024-07-29 LAB
BILIRUB BLD-MCNC: NEGATIVE MG/DL
CLARITY, POC: CLEAR
COLOR UR: YELLOW
EXPIRATION DATE: ABNORMAL
GLUCOSE UR STRIP-MCNC: NEGATIVE MG/DL
HBA1C MFR BLD: 6.8 % (ref 4.5–5.7)
KETONES UR QL: NEGATIVE
LEUKOCYTE EST, POC: NEGATIVE
Lab: ABNORMAL
NITRITE UR-MCNC: NEGATIVE MG/ML
PH UR: 5 [PH] (ref 5–8)
PROT UR STRIP-MCNC: NEGATIVE MG/DL
RBC # UR STRIP: NEGATIVE /UL
SP GR UR: 1.01 (ref 1–1.03)
UROBILINOGEN UR QL: NORMAL

## 2024-07-29 RX ORDER — BLOOD SUGAR DIAGNOSTIC
1 STRIP MISCELLANEOUS DAILY
Qty: 100 EACH | Refills: 12 | Status: SHIPPED | OUTPATIENT
Start: 2024-07-29

## 2024-07-29 RX ORDER — EZETIMIBE 10 MG/1
10 TABLET ORAL DAILY
Qty: 90 TABLET | Refills: 3 | Status: SHIPPED | OUTPATIENT
Start: 2024-07-29

## 2024-07-29 NOTE — ASSESSMENT & PLAN NOTE
Patient's (Body mass index is 33.4 kg/m².) indicates that they are obese (BMI >30) with health conditions that include hypertension, diabetes mellitus, and dyslipidemias . Weight is improving with treatment. BMI  is above average; BMI management plan is completed. We discussed portion control and increasing exercise.

## 2024-08-04 PROBLEM — I71.42 JUXTARENAL ABDOMINAL AORTIC ANEURYSM (AAA) WITHOUT RUPTURE: Status: RESOLVED | Noted: 2023-02-07 | Resolved: 2024-08-04

## 2024-08-04 PROBLEM — I49.3 VENTRICULAR ECTOPY: Status: ACTIVE | Noted: 2024-08-04

## 2024-10-21 ENCOUNTER — TELEPHONE (OUTPATIENT)
Dept: FAMILY MEDICINE CLINIC | Facility: CLINIC | Age: 74
End: 2024-10-21
Payer: MEDICARE

## 2024-10-21 DIAGNOSIS — I10 ESSENTIAL HYPERTENSION: ICD-10-CM

## 2024-10-21 DIAGNOSIS — Z79.4 TYPE 2 DIABETES MELLITUS WITHOUT COMPLICATION, WITH LONG-TERM CURRENT USE OF INSULIN: ICD-10-CM

## 2024-10-21 DIAGNOSIS — E11.9 TYPE 2 DIABETES MELLITUS WITHOUT COMPLICATION, WITH LONG-TERM CURRENT USE OF INSULIN: ICD-10-CM

## 2024-10-21 DIAGNOSIS — Z12.5 SCREENING FOR PROSTATE CANCER: ICD-10-CM

## 2024-10-21 DIAGNOSIS — E78.2 MIXED HYPERLIPIDEMIA: Primary | ICD-10-CM

## 2024-10-21 NOTE — PROGRESS NOTES
Constricted [employer] message sent  Good morning we have your AAA ultrasound back and per Dr. John  AAA showed No evidence for abdominoaortic aneurysm.  Constricted Constricted Constricted Full Full Constricted Constricted Full however demonstrating improvement in range from initial exam--had laughed and smiled appropriately during today's exam/Constricted Constricted Full Constricted Constricted Constricted Full however demonstrating improvement in range from initial exam/Constricted Full Full Full

## 2024-10-21 NOTE — TELEPHONE ENCOUNTER
Patient has been called and he said that since he coming on the 5th and he was not thinking they would have to have a separate trip for his labs then he said that he will just wait till he comes in for his appointment

## 2024-10-21 NOTE — TELEPHONE ENCOUNTER
"    Caller: Herson Ruby \"Ishan\"    Relationship: Self    Best call back number: [phone]     What orders are you requesting (i.e. lab or imaging): LABS    In what timeframe would the patient need to come in: 11/5/24    Where will you receive your lab/imaging services: [facility]    Additional notes:         "

## 2024-11-01 NOTE — PROGRESS NOTES
" Subjective   The ABCs of the Annual Wellness Visit  Medicare Wellness Visit      Herson Ruby is a 74 y.o. patient who presents for a Medicare Wellness Visit.    The following portions of the patient's history were reviewed and   updated as appropriate: allergies, current medications, past family history, past medical history, past social history, past surgical history, and problem list.    Compared to one year ago, the patient's physical   health is the same.  Compared to one year ago, the patient's mental   health is the same.    Recent Hospitalizations:  He was not admitted to the hospital during the last year.     Current Medical Providers:  Patient Care Team:  Nunu John MD as PCP - General (Family Medicine)  Chalino Ballard Sr., MD as Consulting Physician (Family Medicine)  Clinton Dorado MD as Consulting Physician (Urology)  Clinton Hugo MD as Consulting Physician (Allergy and Immunology)    Outpatient Medications Prior to Visit   Medication Sig Dispense Refill    albuterol sulfate  (90 Base) MCG/ACT inhaler Inhale 2 puffs Every 4 (Four) Hours As Needed for Wheezing.      ALLERGIST TRAY 1CC 26GX1/2\" 26G X 1/2\" 1 ML kit USE AS DIRECTED TO ADMINISTER ALLERGY INJECTIONS      amLODIPine (NORVASC) 10 MG tablet TAKE 1 TABLET BY MOUTH EVERY DAY FOR BLOOD PRESSURE 90 tablet 3    aspirin 81 MG EC tablet Take 1 tablet by mouth Daily.      dutasteride (AVODART) 0.5 MG capsule TAKE 1 CAPSULE BY MOUTH EVERY DAY 90 capsule 3    EPINEPHrine (EPIPEN) 0.3 MG/0.3ML solution auto-injector injection INJECT INTRAMUSCULARLY IN CASE OF ANAPHYLAXIS      ezetimibe (ZETIA) 10 MG tablet Take 1 tablet by mouth Daily. 90 tablet 3    glucose blood (OneTouch Ultra) test strip 1 each by Other route Daily. Use as instructed 100 each 12    ipratropium (ATROVENT) 0.06 % nasal spray 2 sprays 2 (Two) Times a Day.      losartan (COZAAR) 50 MG tablet TAKE 1 TABLET BY MOUTH EVERY DAY 90 tablet 4    meloxicam (MOBIC) " 15 MG tablet Take 1 tablet by mouth Daily. Every 2 to 3 days 90 tablet 3    metFORMIN (GLUCOPHAGE) 500 MG tablet TAKE 1 TABLET BY MOUTH TWICE A  tablet 3    Multiple Vitamins-Minerals (CENTRUM SILVER 50+MEN PO) Take  by mouth.      Omega-3 Fatty Acids (FISH OIL) 1000 MG capsule capsule Take 1 capsule by mouth Daily With Breakfast.      OneTouch Delica Lancets 33G misc 1 each Daily. 30 each 12    vitamin C (ASCORBIC ACID) 500 MG tablet Take 1 tablet by mouth Daily.      simvastatin (ZOCOR) 80 MG tablet TAKE 1 TABLET BY MOUTH EVERY NIGHT 90 tablet 3     No facility-administered medications prior to visit.     No opioid medication identified on active medication list. I have reviewed chart for other potential  high risk medication/s and harmful drug interactions in the elderly.      Aspirin is on active medication list. Aspirin use is indicated based on review of current medical condition/s. Pros and cons of this therapy have been discussed today. Benefits of this medication outweigh potential harm.  Patient has been encouraged to continue taking this medication.  .      Patient Active Problem List   Diagnosis    Mixed hyperlipidemia    History of ankle surgery    Type 2 diabetes mellitus without complication, with long-term current use of insulin    Allergic rhinitis    Essential hypertension    Class 1 obesity due to excess calories with serious comorbidity and body mass index (BMI) of 33.0 to 33.9 in adult    Screening for colon cancer    Benign prostatic hyperplasia with urinary obstruction    Screening for prostate cancer    History of tobacco use    History of bladder cancer    Status post replacement of left shoulder joint    Degeneration of intervertebral disc of lumbar spine without disc herniation    Medicare annual wellness visit, subsequent    Centrilobular emphysema    Ventricular ectopy     Advance Care Planning Advance Directive is on file.  ACP discussion was held with the patient during this  "visit. Patient has an advance directive in EMR which is still valid.             Objective   Vitals:    24 0839   BP: (!) 150/104   BP Location: Left arm   Patient Position: Sitting   Cuff Size: Adult   Pulse: 94   Resp: 18   Temp: 97.8 °F (36.6 °C)   TempSrc: Temporal   SpO2: 95%   Weight: 103 kg (227 lb)   Height: 175.3 cm (69.02\")   PainSc: 0-No pain       Estimated body mass index is 33.51 kg/m² as calculated from the following:    Height as of this encounter: 175.3 cm (69.02\").    Weight as of this encounter: 103 kg (227 lb).            Does the patient have evidence of cognitive impairment? No  Lab Results   Component Value Date    CHLPL 220 (H) 2024    TRIG 161 (H) 2024    HDL 65 2024     (H) 2024    VLDL 28 2024    HGBA1C 6.3 (A) 2024                                                                                                Health  Risk Assessment    Smoking Status:  Social History     Tobacco Use   Smoking Status Former    Current packs/day: 0.00    Average packs/day: 2.0 packs/day for 37.0 years (74.0 ttl pk-yrs)    Types: Cigarettes    Start date: 1969    Quit date: 2006    Years since quittin.8    Passive exposure: Past   Smokeless Tobacco Never     Alcohol Consumption:  Social History     Substance and Sexual Activity   Alcohol Use Yes    Comment: on occasion       Fall Risk Screen  STEADI Fall Risk Assessment was completed, and patient is at LOW risk for falls.Assessment completed on:2024    Depression Screenin/5/2024     8:43 AM   PHQ-2/PHQ-9 Depression Screening   Little interest or pleasure in doing things Not at all   Feeling down, depressed, or hopeless Not at all   5  min spent discussing and preforming screening.     Health Habits and Functional and Cognitive Screenin/5/2024     8:00 AM   Functional & Cognitive Status   Do you have difficulty bathing yourself, getting dressed or grooming yourself? No   Do " you have difficulty using the toilet? No   Do you have difficulty moving around from place to place? No   Do you have trouble with steps or getting out of a bed or a chair? No   Current Diet Diabetic Diet   Dental Exam Not up to date   Eye Exam Up to date   Exercise (times per week) 3 times per week   Current Exercises Include House Cleaning   Do you need help using the phone?  No   Are you deaf or do you have serious difficulty hearing?  No   Do you need help to go to places out of walking distance? No   Do you need help shopping? No   Do you need help preparing meals?  No   Do you need help with housework?  No   Do you need help with laundry? No   Do you need help taking your medications? No   Do you need help managing money? No   Do you ever drive or ride in a car without wearing a seat belt? No   Have you felt unusual stress, anger or loneliness in the last month? No   Who do you live with? Spouse   If you need help, do you have trouble finding someone available to you? No   Have you been bothered in the last four weeks by sexual problems? No   Do you have difficulty concentrating, remembering or making decisions? No           Age-appropriate Screening Schedule:  Refer to the list below for future screening recommendations based on patient's age, sex and/or medical conditions. Orders for these recommended tests are listed in the plan section. The patient has been provided with a written plan.    Health Maintenance List  Health Maintenance   Topic Date Due    TDAP/TD VACCINES (1 - Tdap) Never done    ZOSTER VACCINE (2 of 3) 02/26/2013    COVID-19 Vaccine (6 - 2024-25 season) 09/01/2024    DIABETIC EYE EXAM  11/06/2024    RSV Vaccine - Adults (1 - 1-dose 75+ series) Never done    HEMOGLOBIN A1C  05/05/2025    ANNUAL WELLNESS VISIT  11/05/2025    LIPID PANEL  11/05/2025    BMI FOLLOWUP  11/05/2025    COLORECTAL CANCER SCREENING  03/08/2032    HEPATITIS C SCREENING  Completed    INFLUENZA VACCINE  Completed     Pneumococcal Vaccine 65+  Completed    AAA SCREEN (ONE-TIME)  Completed    URINE MICROALBUMIN  Discontinued    LUNG CANCER SCREENING  Discontinued                                                                                                                                                CMS Preventative Services Quick Reference  Risk Factors Identified During Encounter  Cardiovascular risk, risk factors modified.     The above risks/problems have been discussed with the patient.  Pertinent information has been shared with the patient in the After Visit Summary.  An After Visit Summary and PPPS were made available to the patient.    Follow Up:   Next Medicare Wellness visit to be scheduled in 1 year.         Additional E&M Note during same encounter follows:  Patient has additional, significant, and separately identifiable condition(s)/problem(s) that require work above and beyond the Medicare Wellness Visit     Chief Complaint  Medicare Wellness-subsequent, Diabetes, Hypertension, Hyperlipidemia, and COPD    Subjective   Diabetes  He presents for his follow-up diabetic visit. He has type 2 diabetes mellitus. His disease course has been stable. Pertinent negatives for hypoglycemia include no dizziness, headaches, nervousness/anxiousness, pallor or seizures. Pertinent negatives for diabetes include no chest pain, no fatigue, no foot ulcerations, no polyphagia and no weakness. Pertinent negatives for hypoglycemia complications include no blackouts and no hospitalization. Symptoms are stable. Pertinent negatives for diabetic complications include no CVA or PVD. Risk factors for coronary artery disease include diabetes mellitus, male sex, obesity, tobacco exposure, hypertension and dyslipidemia. Current diabetic treatment includes oral agent (monotherapy) (Metformin). He is compliant with treatment some of the time. His weight is stable. He is following a diabetic diet. When asked about meal planning, he reported none.  He has not had a previous visit with a dietitian. He participates in exercise three times a week. His breakfast blood glucose range is generally 110-130 mg/dl. (Bs this am 126) An ACE inhibitor/angiotensin II receptor blocker is not being taken. He does not see a podiatrist. Eye exam is not current.   Hypertension  This is a chronic problem. The current episode started more than 1 year ago. The problem has been stable since onset. The problem is controlled. Pertinent negatives include no chest pain, headaches, malaise/fatigue, neck pain, orthopnea, palpitations, peripheral edema, PND or shortness of breath. There are no associated agents to hypertension. Risk factors for coronary artery disease include male gender, obesity, dyslipidemia, smoking/tobacco exposure and diabetes mellitus. Past treatments include nothing. Current antihypertension treatment includes angiotensin blockers and calcium channel blockers. The current treatment provides mild improvement. There are no compliance problems.  There is no history of angina, kidney disease, CVA, heart failure, left ventricular hypertrophy or PVD. There is no history of chronic renal disease, coarctation of the aorta, hyperaldosteronism, a hypertension causing med, pheochromocytoma or renovascular disease.   Hyperlipidemia  This is a chronic problem. The current episode started more than 1 year ago. The problem is controlled. Exacerbating diseases include diabetes and obesity. He has no history of chronic renal disease, hypothyroidism or nephrotic syndrome. Factors aggravating his hyperlipidemia include fatty foods. Pertinent negatives include no chest pain, focal weakness, myalgias or shortness of breath. Current antihyperlipidemic treatment includes statins, herbal therapy and ezetimibe. The current treatment provides mild improvement of lipids. There are no compliance problems.  Risk factors for coronary artery disease include dyslipidemia, diabetes mellitus, male  "sex and obesity.     Ishan is also being seen today for additional medical problem/s.    Review of Systems   Constitutional:  Negative for activity change, appetite change, fatigue and malaise/fatigue.   HENT:  Positive for rhinorrhea (mild). Negative for congestion, hearing loss and sinus pressure.    Eyes:  Negative for visual disturbance.   Respiratory:  Negative for shortness of breath and wheezing.    Cardiovascular:  Negative for chest pain, palpitations, orthopnea and PND.   Gastrointestinal:  Negative for abdominal pain, constipation, diarrhea, nausea and vomiting.   Endocrine: Negative for cold intolerance, heat intolerance and polyphagia.   Genitourinary:  Negative for dysuria, flank pain, frequency, hematuria and urgency.   Musculoskeletal:  Positive for arthralgias (mkultiple moderate). Negative for joint swelling, myalgias and neck pain.   Skin:  Negative for pallor and rash.   Allergic/Immunologic: Positive for environmental allergies.   Neurological:  Negative for dizziness, focal weakness, seizures and weakness.   Hematological:  Negative for adenopathy. Does not bruise/bleed easily.   Psychiatric/Behavioral:  The patient is not nervous/anxious.               Objective   Vital Signs:  BP (!) 150/104 (BP Location: Left arm, Patient Position: Sitting, Cuff Size: Adult)   Pulse 94   Temp 97.8 °F (36.6 °C) (Temporal)   Resp 18   Ht 175.3 cm (69.02\")   Wt 103 kg (227 lb)   SpO2 95%   BMI 33.51 kg/m²   Physical Exam  Constitutional:       General: He is not in acute distress.     Appearance: Normal appearance. He is well-developed.   HENT:      Head: Normocephalic.      Right Ear: Tympanic membrane and external ear normal.      Left Ear: Tympanic membrane and external ear normal.      Nose: No mucosal edema.      Mouth/Throat:      Mouth: No oral lesions.      Pharynx: No oropharyngeal exudate.   Eyes:      General: No scleral icterus.     Conjunctiva/sclera: Conjunctivae normal.      Pupils: Pupils " are equal, round, and reactive to light.   Neck:      Thyroid: No thyromegaly.      Vascular: No JVD.   Cardiovascular:      Rate and Rhythm: Normal rate and regular rhythm. No extrasystoles are present.     Pulses:           Carotid pulses are 2+ on the right side and 2+ on the left side.       Femoral pulses are 2+ on the right side and 2+ on the left side.       Dorsalis pedis pulses are 2+ on the right side and 2+ on the left side.      Heart sounds: Normal heart sounds. No murmur heard.     No friction rub. No gallop.   Pulmonary:      Effort: Pulmonary effort is normal.      Breath sounds: Normal breath sounds. No decreased breath sounds.   Abdominal:      General: Bowel sounds are normal. There is no distension.      Palpations: Abdomen is soft. There is no mass.      Tenderness: There is no abdominal tenderness.      Hernia: No hernia is present. There is no hernia in the left inguinal area.   Genitourinary:     Testes: Normal.   Musculoskeletal:         General: Deformity (knees back and distal fingers.) present.      Cervical back: Normal range of motion and neck supple. No edema.      Right lower leg: No edema.      Left lower leg: No edema.   Lymphadenopathy:      Head:      Right side of head: No submandibular adenopathy.      Left side of head: No submandibular adenopathy.      Cervical: No cervical adenopathy.      Upper Body:      Right upper body: No supraclavicular adenopathy.      Left upper body: No supraclavicular adenopathy.   Skin:     General: Skin is warm and dry.      Findings: No ecchymosis, erythema or rash.   Neurological:      Mental Status: He is alert and oriented to person, place, and time. He is not disoriented.      Cranial Nerves: No cranial nerve deficit.      Sensory: No sensory deficit.      Motor: No abnormal muscle tone.      Coordination: Coordination normal.      Deep Tendon Reflexes: Reflexes are normal and symmetric.   Psychiatric:         Speech: Speech normal.          Thought Content: Thought content normal.         Judgment: Judgment normal.         The following data was reviewed by: Nunu John MD on 11/05/2024:        Medicare annual wellness visit, subsequent    Centrilobular emphysema    Type 2 diabetes mellitus without complication, with long-term current use of insulin  Diabetes is stable.   Continue current treatment regimen.  Diabetes will be reassessed in 3 months  Essential hypertension    Mixed hyperlipidemia   Lipid abnormalities are stable    Plan:  Continue same medication/s without change.      Discussed medication dosage, use, side effects, and goals of treatment in detail.    Counseled patient on lifestyle modifications to help control hyperlipidemia.     Patient Treatment Goals:   LDL goal is less than 70    Followup in 3 months.  Benign prostatic hyperplasia with urinary obstruction    Degeneration of intervertebral disc of lumbar spine without disc herniation    Seasonal allergic rhinitis due to pollen    History of tobacco use    History of bladder cancer    Screening for colon cancer    Screening for prostate cancer    Class 1 obesity due to excess calories with serious comorbidity and body mass index (BMI) of 33.0 to 33.9 in adult  Patient's (Body mass index is 33.51 kg/m².) indicates that they are obese (BMI >30) with health conditions that include hypertension, diabetes mellitus, and dyslipidemias . Weight is unchanged. BMI  is above average; BMI management plan is completed. We discussed portion control and increasing exercise.   Screening PSA (prostate specific antigen)    Elevated liver enzymes      Orders Placed This Encounter   Procedures     CT Chest Low Dose Cancer Screening WO     Order Specific Question:   Has the Patient had a Chest CT scan within the past 12 months?     Answer:   No     Order Specific Question:   The patient is age 50-80 (Medicare coverage 50-77)     Answer:   73     Order Specific Question:   The patient is a current  smoker?     Answer:   No     Order Specific Question:   Is the patient a former smoker who has quit within the last 15 years? (If the answer to this is no they do not meet criteria for this exam)     Answer:   Yes     Order Specific Question:   The number of years since quitting smoking.     Answer:   18     Order Specific Question:   Does the patient have a smoking history of 20 pack-years or greater? (If the answer to this is no they do not meet criteria for this exam)     Answer:   Yes     Order Specific Question:   Actual pack - year smoking history (number):     Answer:   74     Order Specific Question:   Does the patient have any clinical signs/symptoms of lung cancer?     Answer:   No     Order Specific Question:   The patient was engaged in shared decision-making for this test:     Answer:   Yes     Order Specific Question:   Release to patient     Answer:   Routine Release [[id number]]     Order Specific Question:   Reason for Exam:     Answer:   Former smoker, family hx lung ca    Comprehensive Metabolic Panel     Order Specific Question:   Release to patient     Answer:   Routine Release [[id number]]    Lipid Panel With / Chol / HDL Ratio     Order Specific Question:   Release to patient     Answer:   Routine Release [[id number]]    PSA Screen     Order Specific Question:   Release to patient     Answer:   Routine Release [[id number]]    TSH     Order Specific Question:   Release to patient     Answer:   Routine Release [[id number]]    Microalbumin / Creatinine Urine Ratio - Urine, Random Void     Order Specific Question:   Release to patient     Answer:   Routine Release [[id number]]    POCT urinalysis dipstick, manual     Order Specific Question:   Release to patient     Answer:   Routine Release [[id number]]    POC Glycosylated Hemoglobin (Hb A1C)     Order Specific Question:   Release to patient     Answer:   Routine Release [[id number]]    CBC & Differential     Order Specific Question:   Manual  Differential     Answer:   No     Order Specific Question:   Release to patient     Answer:   Routine Release [[id number]]        Assessment and Plan CC Problem List  Visit Diagnosis  ROS  Review (Popup)  [facility] Maintenance  Quality  BestPractice  Medications  SmartSets  SnapShot Encounters  Media  Problem List Items Addressed This Visit          High    Type 2 diabetes mellitus without complication, with long-term current use of insulin    Overview     A1c 6.3 11/05/2024 improved  A1c 6.8 07/29/2024 fairly stable.   A1c 6.7 04/29/2024 stable and well controlled.   A1c 6.7 01/17/2024 worse    A1c 6.4 10/16/2023 controlled.   A1c 6.0 03/18/2022   A1c 6.3 12/01/2021  A1c 6.1, 11/18/2020  A1c 5.9,  08/2020  Controlled.          Current Assessment & Plan     Diabetes is stable.   Continue current treatment regimen.  Diabetes will be reassessed in 3 months         Relevant Orders    CBC & Differential (Completed)    Comprehensive Metabolic Panel (Completed)    POCT urinalysis dipstick, manual (Completed)    POC Glycosylated Hemoglobin (Hb A1C) (Completed)    Microalbumin / Creatinine Urine Ratio - Urine, Random Void (Completed)    Essential hypertension    Overview     Marginally Controlled. Compliant continue meds monitor pressures and follow up in 1 mo. For re evaluation. Lab notify of results.          Centrilobular emphysema    Overview     Minimal sxs, abstains from cigarettes, rare use rescue inhaler, continued             Medium    Mixed hyperlipidemia    Overview     Controlled with Fish oil d/c atorvastatin for cost, simvastatin. Compliant add zetia to achieve a LDL of 70.           Current Assessment & Plan      Lipid abnormalities are stable    Plan:  Continue same medication/s without change.      Discussed medication dosage, use, side effects, and goals of treatment in detail.    Counseled patient on lifestyle modifications to help control hyperlipidemia.     Patient Treatment Goals:   LDL goal  is less than 70    Followup in 3 months.         Relevant Orders    Lipid Panel With / Chol / HDL Ratio (Completed)    TSH (Completed)       Low    Allergic rhinitis    Overview     Controlled on ImmunoRx given.   He is compliant with meds and immunoRx.         Class 1 obesity due to excess calories with serious comorbidity and body mass index (BMI) of 33.0 to 33.9 in adult    Overview     Healthy diet and regular exercise discussed and  Encouraged          Current Assessment & Plan     Patient's (Body mass index is 33.51 kg/m².) indicates that they are obese (BMI >30) with health conditions that include hypertension, diabetes mellitus, and dyslipidemias . Weight is unchanged. BMI  is above average; BMI management plan is completed. We discussed portion control and increasing exercise.          Benign prostatic hyperplasia with urinary obstruction    Overview     Followed with [facility]. On meds compliant, sxs controlled.         History of tobacco use    Overview     2 ppd x 30 yrs  Low dose CT 11/2019 10/2020 negative, 12/22 negative, repeat 23  He continues to abstain         Relevant Orders     CT Chest Low Dose Cancer Screening WO    History of bladder cancer    Overview     2002, stopped smoking.   Followed with [facility]. Last seen 10/2020, last cystoscopy 9/23         Degeneration of intervertebral disc of lumbar spine without disc herniation    Overview     Moderate to severe DJD of lower back.   Sxs improved w/ rare anti inflammatories continued            Unprioritized    Screening for colon cancer    Overview     Colonoscopy 2022, repeat in 7 years         Screening for prostate cancer    Overview       Lab Results   Component Value Date    PSA 1.0 10/16/2023    PSA 1.2 10/05/2022    PSA 2.2 09/01/2021             Medicare annual wellness visit, subsequent - Primary    Overview     Healthy diet and regular exercise. Seat belts, fall prevention and general safety discussed. Previous lab reviewed in detail           Other Visit Diagnoses       Screening PSA (prostate specific antigen)        Relevant Orders    PSA Screen (Completed)    Elevated liver enzymes        unremarkable Ct abdomen and pelvis 02/12/2024 ordering a hepatitis panel, ferritin, and serum iron to be checked in 1 month            Follow Up   Return in about 3 months (around 2/5/2025).  Patient was given instructions and counseling regarding his condition or for health maintenance advice. Please see specific information pulled into the AVS if appropriate.    Advanced Care Planning:  ACP discussion was held with the patient during this visit. Patient has an advance directive (not in EMR), copy requested.    A face-to-face visit was completed today with patient.  Counseling explanation, and discussion of advanced directives was performed.   The last advanced care visit was performed in 2023.  In a near life ending situation, from which he is not expected to recover functionally, and he is not able to speak for himself, he does not want life sustaining measures. We discussed feeding tubes, ventilators and cardiac support as well as dialysis.    I spent more than 16 minutes discussing Advanced Care Planning information and documenting in the chart. His wife is present

## 2024-11-05 ENCOUNTER — OFFICE VISIT (OUTPATIENT)
Dept: FAMILY MEDICINE CLINIC | Facility: CLINIC | Age: 74
End: 2024-11-05
Payer: MEDICARE

## 2024-11-05 VITALS
BODY MASS INDEX: 33.62 KG/M2 | OXYGEN SATURATION: 95 % | SYSTOLIC BLOOD PRESSURE: 150 MMHG | HEART RATE: 94 BPM | RESPIRATION RATE: 18 BRPM | TEMPERATURE: 97.8 F | HEIGHT: 69 IN | WEIGHT: 227 LBS | DIASTOLIC BLOOD PRESSURE: 104 MMHG

## 2024-11-05 DIAGNOSIS — J43.2 CENTRILOBULAR EMPHYSEMA: ICD-10-CM

## 2024-11-05 DIAGNOSIS — Z12.11 SCREENING FOR COLON CANCER: ICD-10-CM

## 2024-11-05 DIAGNOSIS — Z12.5 SCREENING FOR PROSTATE CANCER: ICD-10-CM

## 2024-11-05 DIAGNOSIS — R74.8 ELEVATED LIVER ENZYMES: ICD-10-CM

## 2024-11-05 DIAGNOSIS — Z12.5 SCREENING PSA (PROSTATE SPECIFIC ANTIGEN): ICD-10-CM

## 2024-11-05 DIAGNOSIS — N13.8 BENIGN PROSTATIC HYPERPLASIA WITH URINARY OBSTRUCTION: ICD-10-CM

## 2024-11-05 DIAGNOSIS — I10 ESSENTIAL HYPERTENSION: ICD-10-CM

## 2024-11-05 DIAGNOSIS — E11.9 TYPE 2 DIABETES MELLITUS WITHOUT COMPLICATION, WITH LONG-TERM CURRENT USE OF INSULIN: ICD-10-CM

## 2024-11-05 DIAGNOSIS — Z87.891 HISTORY OF TOBACCO USE: ICD-10-CM

## 2024-11-05 DIAGNOSIS — Z85.51 HISTORY OF BLADDER CANCER: ICD-10-CM

## 2024-11-05 DIAGNOSIS — E66.811 CLASS 1 OBESITY DUE TO EXCESS CALORIES WITH SERIOUS COMORBIDITY AND BODY MASS INDEX (BMI) OF 33.0 TO 33.9 IN ADULT: ICD-10-CM

## 2024-11-05 DIAGNOSIS — J30.1 SEASONAL ALLERGIC RHINITIS DUE TO POLLEN: ICD-10-CM

## 2024-11-05 DIAGNOSIS — Z00.00 MEDICARE ANNUAL WELLNESS VISIT, SUBSEQUENT: Primary | ICD-10-CM

## 2024-11-05 DIAGNOSIS — M51.369 DEGENERATION OF INTERVERTEBRAL DISC OF LUMBAR SPINE WITHOUT DISC HERNIATION: ICD-10-CM

## 2024-11-05 DIAGNOSIS — E66.09 CLASS 1 OBESITY DUE TO EXCESS CALORIES WITH SERIOUS COMORBIDITY AND BODY MASS INDEX (BMI) OF 33.0 TO 33.9 IN ADULT: ICD-10-CM

## 2024-11-05 DIAGNOSIS — N40.1 BENIGN PROSTATIC HYPERPLASIA WITH URINARY OBSTRUCTION: ICD-10-CM

## 2024-11-05 DIAGNOSIS — Z79.4 TYPE 2 DIABETES MELLITUS WITHOUT COMPLICATION, WITH LONG-TERM CURRENT USE OF INSULIN: ICD-10-CM

## 2024-11-05 DIAGNOSIS — E78.2 MIXED HYPERLIPIDEMIA: ICD-10-CM

## 2024-11-05 PROBLEM — D13.4 ADENOMA OF LIVER: Status: RESOLVED | Noted: 2019-10-02 | Resolved: 2024-11-05

## 2024-11-05 LAB
BILIRUB BLD-MCNC: NEGATIVE MG/DL
CLARITY, POC: CLEAR
COLOR UR: YELLOW
EXPIRATION DATE: ABNORMAL
GLUCOSE UR STRIP-MCNC: NEGATIVE MG/DL
HBA1C MFR BLD: 6.3 % (ref 4.5–5.7)
KETONES UR QL: NEGATIVE
LEUKOCYTE EST, POC: NEGATIVE
Lab: ABNORMAL
NITRITE UR-MCNC: NEGATIVE MG/ML
PH UR: 6.5 [PH] (ref 5–8)
PROT UR STRIP-MCNC: ABNORMAL MG/DL
RBC # UR STRIP: NEGATIVE /UL
SP GR UR: 1.02 (ref 1–1.03)
UROBILINOGEN UR QL: ABNORMAL

## 2024-11-05 NOTE — ASSESSMENT & PLAN NOTE
Patient's (Body mass index is 33.51 kg/m².) indicates that they are obese (BMI >30) with health conditions that include hypertension, diabetes mellitus, and dyslipidemias . Weight is unchanged. BMI  is above average; BMI management plan is completed. We discussed portion control and increasing exercise.

## 2024-11-06 DIAGNOSIS — R74.8 ELEVATED LIVER ENZYMES: Primary | ICD-10-CM

## 2024-11-06 DIAGNOSIS — E78.2 MIXED HYPERLIPIDEMIA: ICD-10-CM

## 2024-11-06 DIAGNOSIS — R74.01 ELEVATION OF LEVELS OF LIVER TRANSAMINASE LEVELS: ICD-10-CM

## 2024-11-06 LAB
ALBUMIN SERPL-MCNC: 3.6 G/DL (ref 3.8–4.8)
ALBUMIN/CREAT UR: 5 MG/G CREAT (ref 0–29)
ALP SERPL-CCNC: 57 IU/L (ref 44–121)
ALT SERPL-CCNC: 78 IU/L (ref 0–44)
AST SERPL-CCNC: 60 IU/L (ref 0–40)
BASOPHILS # BLD AUTO: 0.1 X10E3/UL (ref 0–0.2)
BASOPHILS NFR BLD AUTO: 1 %
BILIRUB SERPL-MCNC: 0.5 MG/DL (ref 0–1.2)
BUN SERPL-MCNC: 10 MG/DL (ref 8–27)
BUN/CREAT SERPL: 9 (ref 10–24)
CALCIUM SERPL-MCNC: 8.9 MG/DL (ref 8.6–10.2)
CHLORIDE SERPL-SCNC: 104 MMOL/L (ref 96–106)
CHOLEST SERPL-MCNC: 220 MG/DL (ref 100–199)
CHOLEST/HDLC SERPL: 3.4 RATIO (ref 0–5)
CO2 SERPL-SCNC: 22 MMOL/L (ref 20–29)
CREAT SERPL-MCNC: 1.11 MG/DL (ref 0.76–1.27)
CREAT UR-MCNC: 132.8 MG/DL
EGFRCR SERPLBLD CKD-EPI 2021: 70 ML/MIN/1.73
EOSINOPHIL # BLD AUTO: 0.2 X10E3/UL (ref 0–0.4)
EOSINOPHIL NFR BLD AUTO: 3 %
ERYTHROCYTE [DISTWIDTH] IN BLOOD BY AUTOMATED COUNT: 13 % (ref 11.6–15.4)
GLOBULIN SER CALC-MCNC: 2.7 G/DL (ref 1.5–4.5)
GLUCOSE SERPL-MCNC: 121 MG/DL (ref 70–99)
HCT VFR BLD AUTO: 47.2 % (ref 37.5–51)
HDLC SERPL-MCNC: 65 MG/DL
HGB BLD-MCNC: 15.6 G/DL (ref 13–17.7)
IMM GRANULOCYTES # BLD AUTO: 0 X10E3/UL (ref 0–0.1)
IMM GRANULOCYTES NFR BLD AUTO: 0 %
LDLC SERPL CALC-MCNC: 127 MG/DL (ref 0–99)
LYMPHOCYTES # BLD AUTO: 1.4 X10E3/UL (ref 0.7–3.1)
LYMPHOCYTES NFR BLD AUTO: 24 %
MCH RBC QN AUTO: 30.4 PG (ref 26.6–33)
MCHC RBC AUTO-ENTMCNC: 33.1 G/DL (ref 31.5–35.7)
MCV RBC AUTO: 92 FL (ref 79–97)
MICROALBUMIN UR-MCNC: 6.1 UG/ML
MONOCYTES # BLD AUTO: 0.5 X10E3/UL (ref 0.1–0.9)
MONOCYTES NFR BLD AUTO: 9 %
NEUTROPHILS # BLD AUTO: 3.7 X10E3/UL (ref 1.4–7)
NEUTROPHILS NFR BLD AUTO: 63 %
PLATELET # BLD AUTO: 219 X10E3/UL (ref 150–450)
POTASSIUM SERPL-SCNC: 4.2 MMOL/L (ref 3.5–5.2)
PROT SERPL-MCNC: 6.3 G/DL (ref 6–8.5)
PSA SERPL-MCNC: 0.7 NG/ML (ref 0–4)
RBC # BLD AUTO: 5.13 X10E6/UL (ref 4.14–5.8)
SODIUM SERPL-SCNC: 141 MMOL/L (ref 134–144)
TRIGL SERPL-MCNC: 161 MG/DL (ref 0–149)
TSH SERPL DL<=0.005 MIU/L-ACNC: 1.9 UIU/ML (ref 0.45–4.5)
VLDLC SERPL CALC-MCNC: 28 MG/DL (ref 5–40)
WBC # BLD AUTO: 6 X10E3/UL (ref 3.4–10.8)

## 2024-11-06 RX ORDER — ATORVASTATIN CALCIUM 80 MG/1
80 TABLET, FILM COATED ORAL DAILY
Qty: 90 TABLET | Refills: 3 | Status: SHIPPED | OUTPATIENT
Start: 2024-11-06

## 2024-11-06 NOTE — PROGRESS NOTES
[employer] message was sent   Good morning we have your lab back and per Dr. John   You have normal white blood cell count, normal platelet count ( this is the blood clotting factor) and no signs of anemia.  You have a normal kidney and electrolyte function. Your liver function has come back and remains elevated. Your AST was at 60 and your ALT was at 78. This is likely fatty liver. We did a Ct of the Abdomen that was unremarkable 2/2024. We are going to order a hepatitis panel as well as Serum Iron and ferritin labs to be done. Your total cholesterol was 220 this has increased from the last time it was checked as it was 184, your triglycerides was at 161 this has decreased from the last time it was checked as it was 172, your HDL (healthy cholesterol) was 65 this has remained the same from the last time it was checked as it was 65( we want this to be as high as we can get it and we do this by exercise, exercise), your LDL ( lousy cholesterol) was 127 this has decreased from the last time it was checked as it was 90. Your total cholesterol/cardiac ratio was 3.4 this has increased from the last time as it was 2.8. we need to change your simvastatin 80 to atorvastatin 80 and check this again in 1 month with a hepatic lab as well as a lipid panel. Continue to work on diet along with exercise and taking medications as prescribed and we need to do the labs that's needed for the elevated liver function as well as the cholesterol labs in 1 month. We will get these ordered and [facility] Your PSA was normal. Your yearly diabetic urine has come back normal as well.

## 2024-11-15 ENCOUNTER — HOSPITAL ENCOUNTER (OUTPATIENT)
Dept: CT IMAGING | Facility: HOSPITAL | Age: 74
Discharge: HOME | End: 2024-11-15
Payer: MEDICARE

## 2024-11-15 PROBLEM — L57.0 ACTINIC KERATOSIS: Status: RESOLVED | Noted: 2020-03-23 | Resolved: 2024-11-15

## 2024-11-15 PROCEDURE — 71271 CT THORAX LUNG CANCER SCR C-: CPT

## 2024-11-20 ENCOUNTER — OFFICE VISIT (OUTPATIENT)
Dept: FAMILY MEDICINE CLINIC | Facility: CLINIC | Age: 74
End: 2024-11-20
Payer: MEDICARE

## 2024-11-20 VITALS
WEIGHT: 226.6 LBS | SYSTOLIC BLOOD PRESSURE: 158 MMHG | HEIGHT: 69 IN | BODY MASS INDEX: 33.56 KG/M2 | TEMPERATURE: 98.4 F | RESPIRATION RATE: 18 BRPM | HEART RATE: 71 BPM | DIASTOLIC BLOOD PRESSURE: 98 MMHG | OXYGEN SATURATION: 92 %

## 2024-11-20 DIAGNOSIS — I10 ESSENTIAL HYPERTENSION: ICD-10-CM

## 2024-11-20 DIAGNOSIS — M51.369 DEGENERATION OF INTERVERTEBRAL DISC OF LUMBAR SPINE WITHOUT DISC HERNIATION: Primary | ICD-10-CM

## 2024-11-20 DIAGNOSIS — E11.9 TYPE 2 DIABETES MELLITUS WITHOUT COMPLICATION, WITH LONG-TERM CURRENT USE OF INSULIN: ICD-10-CM

## 2024-11-20 DIAGNOSIS — R35.0 FREQUENT URINATION: ICD-10-CM

## 2024-11-20 DIAGNOSIS — E66.09 CLASS 1 OBESITY DUE TO EXCESS CALORIES WITH SERIOUS COMORBIDITY AND BODY MASS INDEX (BMI) OF 33.0 TO 33.9 IN ADULT: ICD-10-CM

## 2024-11-20 DIAGNOSIS — E66.811 CLASS 1 OBESITY DUE TO EXCESS CALORIES WITH SERIOUS COMORBIDITY AND BODY MASS INDEX (BMI) OF 33.0 TO 33.9 IN ADULT: ICD-10-CM

## 2024-11-20 DIAGNOSIS — Z79.4 TYPE 2 DIABETES MELLITUS WITHOUT COMPLICATION, WITH LONG-TERM CURRENT USE OF INSULIN: ICD-10-CM

## 2024-11-20 PROBLEM — R79.89 ELEVATED LIVER FUNCTION TESTS: Status: ACTIVE | Noted: 2024-11-20

## 2024-11-20 LAB
BILIRUB BLD-MCNC: NEGATIVE MG/DL
CLARITY, POC: CLEAR
COLOR UR: YELLOW
GLUCOSE UR STRIP-MCNC: NEGATIVE MG/DL
KETONES UR QL: NEGATIVE
LEUKOCYTE EST, POC: NEGATIVE
NITRITE UR-MCNC: NEGATIVE MG/ML
PH UR: 6 [PH] (ref 5–8)
PROT UR STRIP-MCNC: ABNORMAL MG/DL
RBC # UR STRIP: NEGATIVE /UL
SP GR UR: 1 (ref 1–1.03)
UROBILINOGEN UR QL: ABNORMAL

## 2024-11-20 RX ORDER — PREDNISONE 10 MG/1
10 TABLET ORAL TAKE AS DIRECTED
Qty: 35 TABLET | Refills: 0 | Status: SHIPPED | OUTPATIENT
Start: 2024-11-20 | End: 2024-12-05

## 2024-11-20 NOTE — PROGRESS NOTES
Answers submitted by the patient for this visit:  Primary Reason for Visit (Submitted on 11/20/2024)  What is the primary reason for your visit?: Back Pain  Chief Complaint  Back Pain, Diabetes, and Hypertension    Subjective     CC  Problem List  Visit Diagnosis   Encounters  Notes  Medications  Labs  Result Review Imaging  Media    Herson Ruby presents to [facility] for   Diabetes  He presents for his follow-up diabetic visit. He has type 2 diabetes mellitus. His disease course has been worsening. Pertinent negatives for diabetes include no weakness. Pertinent negatives for hypoglycemia complications include no blackouts, no hospitalization, no nocturnal hypoglycemia, no required assistance and no required glucagon injection. Symptoms are stable. Risk factors for coronary artery disease include diabetes mellitus, dyslipidemia, male sex, hypertension and tobacco exposure. Current diabetic treatment includes oral agent (monotherapy) (Metformin). He is compliant with treatment most of the time. His weight is stable. He is following a diabetic and generally healthy diet. When asked about meal planning, he reported none. He has not had a previous visit with a dietitian. His breakfast blood glucose is taken between 7-8 am. His breakfast blood glucose range is generally 110-130 mg/dl. (Blood sugar this ) He does not see a podiatrist. Eye exam is current.   Hypertension  This is a chronic problem. The current episode started more than 1 year ago. The problem has been stable since onset. The problem is controlled. Pertinent negatives include no peripheral edema. Risk factors for coronary artery disease include diabetes mellitus, dyslipidemia, male gender and smoking/tobacco exposure. Past treatments include nothing. Current antihypertension treatment includes angiotensin blockers and calcium channel blockers. The current treatment provides mild improvement. There are no  "compliance problems.    Back Pain  This is a new problem. The current episode started in the past 7 days. The problem occurs daily. The problem has been comes and goes since onset. The quality of the pain is described as stabbing, burning and aching. The pain does not radiate. The pain is at a severity of 8/10. The pain is severe. The pain is The same all the time. The symptoms are aggravated by sitting and bending. Stiffness is present At night and all day. Pertinent negatives include no bladder incontinence, bowel incontinence, perianal numbness or weakness. Risk factors include history of cancer. He has tried NSAIDs and muscle relaxant for the symptoms. The treatment provided mild relief.       Review of Systems   Gastrointestinal:  Negative for bowel incontinence.   Genitourinary:  Negative for urinary incontinence.   Musculoskeletal:  Positive for back pain.   Neurological:  Negative for weakness.        Objective   Vital Signs:   /98 (BP Location: Left arm, Patient Position: Sitting, Cuff Size: Large Adult)   Pulse 71   Temp 98.4 °F (36.9 °C) (Temporal)   Resp 18   Ht 175.3 cm (69.02\")   Wt 103 kg (226 lb 9.6 oz)   SpO2 92%   BMI 33.44 kg/m²     Physical Exam  Constitutional:       General: He is not in acute distress.  Cardiovascular:      Rate and Rhythm: Normal rate and regular rhythm.      Heart sounds: No murmur heard.  Pulmonary:      Effort: Pulmonary effort is normal.      Breath sounds: No wheezing.   Musculoskeletal:      Cervical back: Neck supple.      Lumbar back: No tenderness or bony tenderness. Decreased range of motion. Positive right straight leg raise test and positive left straight leg raise test.      Right lower leg: No edema.      Left lower leg: No edema.   Lymphadenopathy:      Cervical: No cervical adenopathy.   Neurological:      Mental Status: He is alert and oriented to person, place, and time.      Motor: No weakness.      Coordination: Coordination normal.      Gait: " Gait abnormal (wide based gait).      Deep Tendon Reflexes: Reflexes abnormal.        Result Review :Labs  Result Review  Imaging  Med Tab  Media                 Assessment and Plan CC Problem List  Visit Diagnosis  ROS  Review (Popup)  Health Maintenance  Quality  BestPractice  Medications  SmartSets  SnapShot Encounters  Media  Problem List Items Addressed This Visit          High    Type 2 diabetes mellitus without complication, with long-term current use of insulin    Overview     A1c 6.3 11/05/2024 improved  at home should support steroids  A1c 6.8 07/29/2024 fairly stable.   A1c 6.7 04/29/2024 stable and well controlled.   A1c 6.7 01/17/2024 worse    A1c 6.4 10/16/2023 controlled.   A1c 6.0 03/18/2022   A1c 6.3 12/01/2021  A1c 6.1, 11/18/2020  A1c 5.9,  08/2020  Controlled.          Essential hypertension    Overview     Marginally Controlled. Compliant continue meds monitor pressures and follow up in 1 mo. For re evaluation. Lab notify of results.             Low    Class 1 obesity due to excess calories with serious comorbidity and body mass index (BMI) of 33.0 to 33.9 in adult    Overview     Healthy diet and regular exercise discussed and  Encouraged          Current Assessment & Plan     Patient's (Body mass index is 33.44 kg/m².) indicates that they are obese (BMI >30) with health conditions that include hypertension, diabetes mellitus, and dyslipidemias . Weight is improving with lifestyle modifications. BMI  is above average; BMI management plan is completed. We discussed portion control and increasing exercise.          Degeneration of intervertebral disc of lumbar spine without disc herniation - Primary    Overview     Exacerbation of pain, after pickup walnuts, systemic steroids ice heat ROM  Moderate to severe DJD of lower back on previous xray  Hx of prostate cancer, xray should sxs not improve over 48 hrs resolve over 1 week. .            Relevant Medications    predniSONE  (DELTASONE) 10 MG tablet     Other Visit Diagnoses       Frequent urination        neg u/a    Relevant Orders    POCT urinalysis dipstick, manual (Completed)            Follow Up Instructions Charge Capture  Follow-up Communications  Return in about 3 months (around 2/20/2025).  Patient was given instructions and counseling regarding his condition or for health maintenance advice. Please see specific information pulled into the AVS if appropriate.

## 2024-12-02 NOTE — ASSESSMENT & PLAN NOTE
Patient's (Body mass index is 33.44 kg/m².) indicates that they are obese (BMI >30) with health conditions that include hypertension, diabetes mellitus, and dyslipidemias . Weight is improving with lifestyle modifications. BMI  is above average; BMI management plan is completed. We discussed portion control and increasing exercise.

## 2024-12-04 DIAGNOSIS — I10 ESSENTIAL HYPERTENSION: ICD-10-CM

## 2024-12-04 RX ORDER — LOSARTAN POTASSIUM 50 MG/1
TABLET ORAL
Qty: 90 TABLET | Refills: 4 | Status: SHIPPED | OUTPATIENT
Start: 2024-12-04

## 2024-12-09 NOTE — PROGRESS NOTES
"Chief Complaint  Establish Care, Hypertension, and Hyperlipidemia    Subjective          Herson Ruby presents to [facility] for     [facility]    Establish care, former patient of Dr. John.     Hypertension  Patient does check blood pressure at home.   Home readings range from  130's/80's to 160's/80's per patient/patient submitted blood pressure diary. Systolic usually <150.  Patient denies  blurred vision, chest pain, dyspnea, headache, palpitations, and peripheral edema   Patient reports they are taking medications as prescribed and they are not having side effects.    Hyperlipidemia  Patient is not following a low cholesterol diet.   Currently is on statin therapy.  Patient reports is exercising.  Patient reports they are taking medications as prescribed and they are not having side effects.    Diabetes  Taking metformin  Has been watching concentrated sweets  A1c has been stable    Objective   Vital Signs:   /82 (BP Location: Left arm, Patient Position: Sitting, Cuff Size: Adult)   Pulse 61   Temp 98.2 °F (36.8 °C) (Temporal)   Resp 18   Ht 175.3 cm (69\")   Wt 103 kg (226 lb 8 oz)   SpO2 96% Comment: room air  BMI 33.45 kg/m²     Physical Exam  Vitals and nursing note reviewed.   Constitutional:       General: He is not in acute distress.     Appearance: Normal appearance. He is not ill-appearing or diaphoretic.   HENT:      Head: Normocephalic and atraumatic.      Right Ear: Tympanic membrane, ear canal and external ear normal.      Left Ear: Tympanic membrane, ear canal and external ear normal.      Nose: No congestion or rhinorrhea.   Eyes:      Extraocular Movements: Extraocular movements intact.      Pupils: Pupils are equal, round, and reactive to light.   Cardiovascular:      Rate and Rhythm: Normal rate and regular rhythm.      Pulses: Normal pulses.   Pulmonary:      Effort: Pulmonary effort is normal.      Breath sounds: Normal breath sounds.   Musculoskeletal: "         General: Normal range of motion.      Cervical back: Normal range of motion and neck supple.   Lymphadenopathy:      Cervical: No cervical adenopathy.   Skin:     General: Skin is warm and dry.   Neurological:      Mental Status: He is alert and oriented to person, place, and time.   Psychiatric:         Mood and Affect: Mood normal.         Behavior: Behavior normal.        Result Review :                 Assessment and Plan    Diagnoses and all orders for this visit:    1. Essential hypertension (Primary)  Overview:  Regimen: Amlodipine 10 mg qd, losartan 50 mg qd    Marginally controlled. Compliant.    Assessment & Plan:  We will continue to monitor.  Feels that BP has creeped up since allergist started on new nasal spray.  He will cut back on that and see if it improves.      2. Mixed hyperlipidemia  Overview:  Regimen: Atorvastatin 80 mg qd, Zetia 10 mg qd, fish oil supplement.    Lab Results   Component Value Date    CHLPL 220 (H) 11/05/2024    TRIG 161 (H) 11/05/2024    HDL 65 11/05/2024     (H) 11/05/2024     Goal is LDL <70.  Unfortunately, Rx is maxed out.  Counseled extensively on specific dietary interventions, weight loss plan.    Assessment & Plan:  We will recheck lipids on follow up in about 3 months.      3. Type 2 diabetes mellitus without complication, with long-term current use of insulin  Overview:  Regimen: Metformin 500 mg bid    A1c 6.3 11/05/2024 improved  A1c 6.8 07/29/2024 fairly stable.   A1c 6.7 04/29/2024 stable and well controlled.   A1c 6.7 01/17/2024 worse    A1c 6.4 10/16/2023 controlled.   A1c 6.0 03/18/2022   A1c 6.3 12/01/2021  A1c 6.1 11/18/2020  A1c 5.9 08/2020    Controlled. Compliant.  Extensively counseled on diet and weight loss.    Assessment & Plan:  We will recheck A1c on follow up in about 3 months.      4. Class 1 obesity due to excess calories with serious comorbidity and body mass index (BMI) of 33.0 to 33.9 in adult  Overview:  We discussed specific  dietary interventions in detail. Specifically, portion control and calorie counting, keeping total calories to around 2,000 or less daily. Cutting out high glycemic carbohydrates such as sugar, white bread, potatoes, pasta. Reducing unhealthy fats including vegetable and seed oils, replacing with olive/avocado oil, eating leaner meats, cutting down on milk/cream consumption. Higher protein breakfasts, avoid cholesterol and carbs in the morning.    Assessment & Plan:  Patient's (Body mass index is 33.45 kg/m².) indicates that they are obese (BMI >30) with health conditions that include hypertension, diabetes mellitus, and dyslipidemias . Weight is unchanged. BMI  is above average; BMI management plan is completed. We discussed portion control and increasing exercise.       5. Transaminitis  Comments:  LFTs have been climbing past 2 years.  Will evaluate further with liver US.  Orders:  -     US Liver; Future       I spent 50 minutes caring for Herson on this date of service. This time includes time spent by me in the following activities:preparing for the visit, reviewing tests, performing a medically appropriate examination and/or evaluation , counseling and educating the patient/family/caregiver, ordering medications, tests, or procedures, and documenting information in the medical record      Josh Lee MD    NOTE: [email or web address], per Health Information accessibility laws, allows progress notes to be visible to patients. Please note that these notes will include professional medical terminology that may be somewhat confusing without some interpretation from your medical professional team. The intent of progress notes is to communicate information between any medical professionals involved in your case, or to serve as future reference for myself or any other provider when reviewing your case, as well as a reference for the patient viewing the record. Please ask a member of the medical team if you have any  questions about terminology or content of note.    DISCLAIMER: This note was transcribed using a Dragon Medical voice recognition system. It may contain mistakes that may have not been recognized during proofreading. This note was also amended on the date signed due to recognized errors.

## 2024-12-10 ENCOUNTER — OFFICE VISIT (OUTPATIENT)
Dept: FAMILY MEDICINE CLINIC | Facility: CLINIC | Age: 74
End: 2024-12-10
Payer: MEDICARE

## 2024-12-10 VITALS
BODY MASS INDEX: 33.55 KG/M2 | TEMPERATURE: 98.2 F | HEIGHT: 69 IN | WEIGHT: 226.5 LBS | HEART RATE: 61 BPM | OXYGEN SATURATION: 96 % | RESPIRATION RATE: 18 BRPM | DIASTOLIC BLOOD PRESSURE: 82 MMHG | SYSTOLIC BLOOD PRESSURE: 136 MMHG

## 2024-12-10 DIAGNOSIS — E66.09 CLASS 1 OBESITY DUE TO EXCESS CALORIES WITH SERIOUS COMORBIDITY AND BODY MASS INDEX (BMI) OF 33.0 TO 33.9 IN ADULT: ICD-10-CM

## 2024-12-10 DIAGNOSIS — E66.811 CLASS 1 OBESITY DUE TO EXCESS CALORIES WITH SERIOUS COMORBIDITY AND BODY MASS INDEX (BMI) OF 33.0 TO 33.9 IN ADULT: ICD-10-CM

## 2024-12-10 DIAGNOSIS — E11.9 TYPE 2 DIABETES MELLITUS WITHOUT COMPLICATION, WITH LONG-TERM CURRENT USE OF INSULIN: ICD-10-CM

## 2024-12-10 DIAGNOSIS — E78.2 MIXED HYPERLIPIDEMIA: ICD-10-CM

## 2024-12-10 DIAGNOSIS — I10 ESSENTIAL HYPERTENSION: Primary | ICD-10-CM

## 2024-12-10 DIAGNOSIS — Z79.4 TYPE 2 DIABETES MELLITUS WITHOUT COMPLICATION, WITH LONG-TERM CURRENT USE OF INSULIN: ICD-10-CM

## 2024-12-10 DIAGNOSIS — R74.01 TRANSAMINITIS: ICD-10-CM

## 2024-12-10 PROCEDURE — 1126F AMNT PAIN NOTED NONE PRSNT: CPT

## 2024-12-10 PROCEDURE — 3075F SYST BP GE 130 - 139MM HG: CPT

## 2024-12-10 PROCEDURE — 3044F HG A1C LEVEL LT 7.0%: CPT

## 2024-12-10 PROCEDURE — G2211 COMPLEX E/M VISIT ADD ON: HCPCS

## 2024-12-10 PROCEDURE — 99215 OFFICE O/P EST HI 40 MIN: CPT

## 2024-12-10 PROCEDURE — 3079F DIAST BP 80-89 MM HG: CPT

## 2024-12-10 NOTE — ASSESSMENT & PLAN NOTE
We will continue to monitor.  Feels that BP has creeped up since allergist started on new nasal spray.  He will cut back on that and see if it improves.

## 2024-12-10 NOTE — ASSESSMENT & PLAN NOTE
Patient's (Body mass index is 33.45 kg/m².) indicates that they are obese (BMI >30) with health conditions that include hypertension, diabetes mellitus, and dyslipidemias . Weight is unchanged. BMI  is above average; BMI management plan is completed. We discussed portion control and increasing exercise.

## 2024-12-20 ENCOUNTER — HOSPITAL ENCOUNTER (OUTPATIENT)
Dept: ULTRASOUND IMAGING | Facility: HOSPITAL | Age: 74
Discharge: HOME | End: 2024-12-20
Payer: MEDICARE

## 2024-12-20 DIAGNOSIS — R74.01 TRANSAMINITIS: ICD-10-CM

## 2024-12-20 PROCEDURE — 76705 ECHO EXAM OF ABDOMEN: CPT

## 2025-01-06 DIAGNOSIS — K76.89 LIVER CYST: ICD-10-CM

## 2025-01-06 DIAGNOSIS — R74.01 TRANSAMINITIS: Primary | ICD-10-CM

## 2025-01-08 ENCOUNTER — TELEPHONE (OUTPATIENT)
Dept: FAMILY MEDICINE CLINIC | Facility: CLINIC | Age: 75
End: 2025-01-08
Payer: MEDICARE

## 2025-01-08 NOTE — TELEPHONE ENCOUNTER
"----- Message from Josh Lee sent at 1/6/2025 12:58 PM EST -----  Elevated liver enzymes likely secondary to hepatic steatosis -this is more commonly referred to as \"fatty liver.\"  Treatment for this is the same as high cholesterol and other metabolic problems.  Specifically, diet and weight loss.  Do advise to reduce unhealthy fats as well as unhealthy carbohydrates as previously discussed.    He does have a complex cyst in his liver as has been seen in previous imaging studies.  Looking back, it has gradually increased in size.  While it is likely benign, I do recommend gastroenterology follow-up just to confirm particularly given that liver enzymes have also been gradually climbing.  I have entered that referral.  Do recommend seeing [facility] in [place] to avoid unnecessarily long waits, if patient is agreeable.    Thank you.  "

## 2025-01-22 ENCOUNTER — PATIENT MESSAGE (OUTPATIENT)
Dept: FAMILY MEDICINE CLINIC | Facility: CLINIC | Age: 75
End: 2025-01-22
Payer: MEDICARE

## 2025-01-29 LAB
ALBUMIN SERPL-MCNC: 3.8 G/DL (ref 3.8–4.8)
ALP SERPL-CCNC: 60 IU/L (ref 44–121)
ALT SERPL-CCNC: 53 IU/L (ref 0–44)
AST SERPL-CCNC: 39 IU/L (ref 0–40)
BILIRUB DIRECT SERPL-MCNC: 0.25 MG/DL (ref 0–0.4)
BILIRUB SERPL-MCNC: 0.7 MG/DL (ref 0–1.2)
CHOLEST SERPL-MCNC: 156 MG/DL (ref 100–199)
CHOLEST/HDLC SERPL: 3.2 RATIO (ref 0–5)
FERRITIN SERPL-MCNC: 149 NG/ML (ref 30–400)
HAV IGM SERPL QL IA: NEGATIVE
HBV CORE IGM SERPL QL IA: NEGATIVE
HBV SURFACE AG SERPL QL IA: NEGATIVE
HCV AB SERPL QL IA: NORMAL
HCV IGG SERPL QL IA: NON REACTIVE
HDLC SERPL-MCNC: 49 MG/DL
IRON SERPL-MCNC: 140 UG/DL (ref 38–169)
LDLC SERPL CALC-MCNC: 84 MG/DL (ref 0–99)
PROT SERPL-MCNC: 6.4 G/DL (ref 6–8.5)
TRIGL SERPL-MCNC: 132 MG/DL (ref 0–149)
VLDLC SERPL CALC-MCNC: 23 MG/DL (ref 5–40)

## 2025-03-10 NOTE — PROGRESS NOTES
Chief Complaint  No chief complaint on file.    Subjective            History of Present Illness  Herson Ruby is a 74-year-old male who presents for evaluation of a liver lesion. He is new to our practice.  Past medical history significant for hypertension, hyperlipidemia, type 2 diabetes, elevated liver function test (chronic condition), BPH, bladder cancer s/p chemotherapy in 2002, type 2.     - He has a history of tobacco use but has not smoked in the past 20 years. He consumes minimal alcohol and reports no history of hepatitis or intravenous drug abuse.     - He reports no abdominal pain, nausea, vomiting, or unintentional weight loss. He is currently attempting to lose weight. His bowel movements are regular, occurring once daily, and he reports no presence of blood in his stool. He expresses concern regarding the potential malignancy of the liver mass noted on his CT scan.      Supplemental Information  He is taking metformin 500 mg twice daily for his diabetes.    SOCIAL HISTORY  He has a history of tobacco use but has not smoked in 20 years. He consumes very little alcohol.    FAMILY HISTORY  His brother had lung cancer. He has had sisters and brothers die from lung cancer, and his father also had lung cancer.    MEDICATIONS  Metformin       Results Reviewed:  1/2825  Hepatitis panel non-reactive    11/5/24  Lipid, iron, ferritin, TSH normal  CMP (AST 60, ALT 78, ALK 57, Total bilirubin 0.5)      US Liver (12/20/2024 08:50)   -Fatty liver  -Complex cyst in the left hepatic lobe, 4.7 cm, corresponding to prior CT  -No biliary dilatation  -Main portal vein is patent with hepatopetal flow    XR Abdomen KUB (02/12/2024 15:10)   - There is a scattered amount of gas and residue within bowel and on specific fashion. No radiopaque urinary tract calculi are seen. There are degenerative changes involving the lumbar spine.     CT Abdomen Pelvis Stone Protocol (02/12/2024 15:09)   -Normal biliary, gallbladder, spleen,  "pancreas  -A 4.3 cm liver cyst within the lateral segment of the left hepatic lobe, too small to adequately characterize  -GI/mesentery, shows sigmoid diverticulosis.  No acute diverticulitis.  Normal appendix  (Refer to report for complete details)    SCANNED - COLONOSCOPY (03/08/2022)   -Grade 2 internal hemorrhoids  -Normal mucosa in the whole colon    Objective   Vital Signs:  /70   Pulse 57   Temp 96.9 °F (36.1 °C)   Ht 175.3 cm (69.02\")   Wt 100 kg (221 lb 1.6 oz)   SpO2 94%   BMI 32.64 kg/m²   Estimated body mass index is 32.64 kg/m² as calculated from the following:    Height as of this encounter: 175.3 cm (69.02\").    Weight as of this encounter: 100 kg (221 lb 1.6 oz).          Physical Exam  Vitals and nursing note reviewed.   Constitutional:       General: He is not in acute distress.     Appearance: Normal appearance. He is normal weight. He is not ill-appearing, toxic-appearing or diaphoretic.   Eyes:      General: No scleral icterus.     Conjunctiva/sclera: Conjunctivae normal.   Pulmonary:      Effort: Pulmonary effort is normal.   Abdominal:      General: Abdomen is flat. Bowel sounds are normal. There is no distension.      Palpations: Abdomen is soft. There is no mass.      Tenderness: There is no abdominal tenderness. There is no right CVA tenderness, left CVA tenderness, guarding or rebound.      Hernia: No hernia is present.      Comments: Large rectus diastasis   Skin:     Coloration: Skin is not jaundiced or pale.      Findings: No erythema or rash.   Neurological:      Mental Status: He is alert and oriented to person, place, and time. Mental status is at baseline.   Psychiatric:         Mood and Affect: Mood normal.              Assessment and Plan     Diagnoses and all orders for this visit:    1. Elevated LFTs (Primary)  -     MRI abdomen w wo contrast mrcp  -     Alpha - 1 - Antitrypsin  -     LINA  -     Anti-Smooth Muscle Antibody Titer  -     CBC & Differential  -     " Comprehensive Metabolic Panel  -     Ferritin  -     Celiac Disease Panel  -     Ceruloplasmin  -     Gamma GT  -     Hepatitis Panel, Acute  -     IgG, IgA, IgM  -     Iron Profile  -     Mitochondrial Antibodies, M2    2. Liver lesion, left lobe  -     MRI abdomen w wo contrast mrcp  -     Alpha - 1 - Antitrypsin  -     LINA  -     Anti-Smooth Muscle Antibody Titer  -     CBC & Differential  -     Comprehensive Metabolic Panel  -     Ferritin  -     Celiac Disease Panel  -     Ceruloplasmin  -     Gamma GT  -     Hepatitis Panel, Acute  -     IgG, IgA, IgM  -     Iron Profile  -     Mitochondrial Antibodies, M2    3. Hepatic steatosis  -     MRI abdomen w wo contrast mrcp  -     Alpha - 1 - Antitrypsin  -     LINA  -     Anti-Smooth Muscle Antibody Titer  -     CBC & Differential  -     Comprehensive Metabolic Panel  -     Ferritin  -     Celiac Disease Panel  -     Ceruloplasmin  -     Gamma GT  -     Hepatitis Panel, Acute  -     IgG, IgA, IgM  -     Iron Profile  -     Mitochondrial Antibodies, M2         Assessment & Plan  1. Hepatic steatosis.  2. Abnormal CT AP: liver lesion  The abdominal ultrasound indicates the presence of hepatic steatosis.   He has been advised to maintain a diet low in sugar and fat, avoid greasy foods and alcohol, and incorporate antioxidant-rich foods such as cranberries, blueberries, and pumpkins. Over-the-counter milk thistle supplements can be taken at a dosage of 400 mg daily. Regular exercise and weight loss are also recommended.   A comprehensive liver serology lab work will be conducted.   An MRI of the abdomen with liver protocol MRCP will be ordered to further characterize the lesion. His liver enzymes will be monitored closely.  A liver lesion has been identified, with its size increasing from 4.3 cm to 4.7 cm. His liver enzymes were within normal limits in 2022, but have since elevated to a range between 60 and 78 as of 11/05/2024. He is currently asymptomatic.  "    Follow-up  The patient will follow up in 3 months.       Return for 3 months.    I have reviewed patient's current medications list, relevant clinical information necessary for today's encounter. I discussed the clinical impression and treatment plan with the patient, who verbalized understanding and in agreement.  All questions were answered and support was provided.    EMR Dragon/Transcription Disclaimer:  This document has been Dictated utilizing Dragon dictation.     Patient or patient representative verbalized consent for the use of Ambient Listening during the visit with  PETER Potts for chart documentation. 3/12/2025  15:44 EDT    Patient Instructions   - Proceed with MRI abdomen for liver evaluation  - Proceed with labs as discussed  -------------------------------------------------------------------------------------  Please keep in mind of the followings healthy tips for \"Fatty Liver\":     Lifestyle modifications, including Mediterranean diet.  Research has proven that reducing your body weight by at least 10% over 12 months has led to significant improvements in liver health.  We recommend exercising for at least 150 minutes/week or increase your activity level by greater than 60 minutes/week  At this time there are unfortunately no proven medications to help improve fatty liver changes however managing your other health concerns such as lowering high cholesterol and blood sugars can reduce risk of future liver injury and prevent progression of liver disease.  We also recommend continued monitoring of liver function test with either our office or with primary care provider every 3 to 6 months for monitoring  Milk thistle over-the-counter supplement.  Take 400 mg once daily.  Weight loss is strongly recommended.   Recommend following a low carb and low sugar diet.   Recommend management of diabetes and elevated cholesterol with primary care provider if indicated.   Alcohol avoidance is " recommended.

## 2025-03-11 NOTE — PROGRESS NOTES
"Chief Complaint  Diabetes and Back Pain    Subjective          Herson Ruby presents to [facility] for     HPI    Diabetes  Patient does not check blood sugar at home.  Associated symptoms include None  Per patient current diet is Well Balanced.  Patient does avoid concentrated sweets.  Patient does not see podiatry.  [facility] and last eye exam was within last 3 months.  Patient reports they are taking medications as prescribed and they are not having side effects.  Last A1c was 6.3 on 11/05/2024.   Current regimen metformin 500 mg.     Left lower back pain  Present for about 2-3 weeks, was unable to exercise at all, including walking on treadmill.   Previously given prednisone and flexeril in November.         Objective   Vital Signs:   /76 (BP Location: Left arm, Patient Position: Sitting, Cuff Size: Adult)   Pulse 64   Temp 98.7 °F (37.1 °C) (Temporal)   Resp 18   Ht 175.3 cm (69\")   Wt 99.8 kg (220 lb)   SpO2 94% Comment: room air  BMI 32.49 kg/m²     Physical Exam  Vitals and nursing note reviewed.   Constitutional:       General: He is not in acute distress.     Appearance: Normal appearance. He is not ill-appearing or diaphoretic.   HENT:      Head: Normocephalic and atraumatic.      Nose: No congestion or rhinorrhea.   Eyes:      Extraocular Movements: Extraocular movements intact.      Pupils: Pupils are equal, round, and reactive to light.   Cardiovascular:      Rate and Rhythm: Normal rate and regular rhythm.      Pulses: Normal pulses.   Pulmonary:      Effort: Pulmonary effort is normal.      Breath sounds: Normal breath sounds.   Musculoskeletal:         General: Normal range of motion.      Cervical back: Normal range of motion.      Lumbar back: Negative right straight leg raise test and negative left straight leg raise test.   Skin:     General: Skin is warm and dry.   Neurological:      Mental Status: He is " alert and oriented to person, place, and time.      Sensory: No sensory deficit.      Motor: No weakness.   Psychiatric:         Mood and Affect: Mood normal.         Behavior: Behavior normal.        Result Review :                 Assessment and Plan    Diagnoses and all orders for this visit:    1. Type 2 diabetes mellitus without complication, with long-term current use of insulin (Primary)  Overview:  Regimen: Metformin 500 mg bid    A1c 6.5 03/12/2025  A1c 6.3 11/05/2024  A1c 6.8 07/29/2024  A1c 6.7 04/29/2024  A1c 6.7 01/17/2024  A1c 6.4 10/16/2023   A1c 6.0 03/18/2022   A1c 6.3 12/01/2021  A1c 6.1 11/18/2020  A1c 5.9 08/2020    Controlled. Compliant.  Extensively counseled on diet and weight loss.    Assessment & Plan:  He is at target.  No modifications needed.  Repeat A1c in 3-6 months.    Orders:  -     POC Glycosylated Hemoglobin (Hb A1C)    2. Degeneration of intervertebral disc of lumbar spine without disc herniation  Assessment & Plan:  Exacerbation.  No red flag features.  Declines Rx.  Agreeable to PT.  Discussed the following measures with the patient:  -For acute back pain pain: rest back, avoid lifting, pulling, pushing, or straining of back; apply intermittent application of cold packs for no longer than 5 minutes at a time (later, may switch to heat, but do not sleep on heating pad); analgesics as directed (avoid aspirin, aleve, naproxen, ibuprofen if history or current problem of GI bleeding, renal disease, or liver disease, avoid tylenol if current problem or history of liver disease).  - Discussed longer term treatment plan of PRN NSAIDs and discussed a home back care exercise program with flexion exercise routine.  Proper lifting with avoidance of heavy lifting discussed.  Sciatica stretch routine given to patient.  - No evidence of red flag symptoms at this time.  Call or return to clinic in 7-10 days if symptoms worsen or fail to improve as anticipated.  If any loss of sensation in  extremities, loss of bowel/bladder function, inability to walk, or if pain becomes severe, report to ER immediately for further evaluation.  Patient verbalized understanding and is in agreement with plan of care.    Orders:  -     Ambulatory Referral to Physical Therapy for Evaluation & Treatment    3. Acute left-sided low back pain, unspecified whether sciatica present  -     POCT urinalysis dipstick, manual  -     Ambulatory Referral to Physical Therapy for Evaluation & Treatment           Patient Instructions   Rest back, avoid lifting, pulling, pushing, or straining of back. Do not spend all day lying/sitting, get up and move around as tolerated.  Apply intermittent application of cold packs for no longer than 5 minutes at a time (later, may switch to heat, but do not sleep on heating pad).  Pain medications as prescribed or according to over-the-counter bottle directions (avoid aspirin, aleve, naproxen, ibuprofen if history or current problem of GI bleeding, renal disease, or liver disease, avoid tylenol if current problem or history of liver disease).  Do back stretching exercises as directed.  Call or return to clinic in 7-10 days if symptoms worsen or fail to improve as anticipated.  If any loss of sensation in extremities, loss of bowel/bladder function, inability to walk, or if pain becomes severe, report to ER immediately for further evaluation.     Josh Lee MD    NOTE: [facility], per Health Information accessibility laws, allows progress notes to be visible to patients. Please note that these notes will include professional medical terminology that may be somewhat confusing without some interpretation from your medical professional team. The intent of progress notes is to communicate information between any medical professionals involved in your case, or to serve as future reference for myself or any other provider when reviewing your case, as well as a reference for the patient viewing the  record. Please ask a member of the medical team if you have any questions about terminology or content of note.    DISCLAIMER: Part of this note may be an electronic transcription/translation of spoken language to printed text using the Dragon Dictation System.

## 2025-03-12 ENCOUNTER — LAB (OUTPATIENT)
Dept: LAB | Facility: HOSPITAL | Age: 75
End: 2025-03-12
Payer: MEDICARE

## 2025-03-12 ENCOUNTER — OFFICE VISIT (OUTPATIENT)
Dept: FAMILY MEDICINE CLINIC | Facility: CLINIC | Age: 75
End: 2025-03-12
Payer: MEDICARE

## 2025-03-12 ENCOUNTER — OFFICE VISIT (OUTPATIENT)
Dept: GASTROENTEROLOGY | Facility: CLINIC | Age: 75
End: 2025-03-12
Payer: MEDICARE

## 2025-03-12 VITALS
DIASTOLIC BLOOD PRESSURE: 76 MMHG | OXYGEN SATURATION: 94 % | HEART RATE: 64 BPM | HEIGHT: 69 IN | WEIGHT: 220 LBS | BODY MASS INDEX: 32.58 KG/M2 | RESPIRATION RATE: 18 BRPM | SYSTOLIC BLOOD PRESSURE: 118 MMHG | TEMPERATURE: 98.7 F

## 2025-03-12 VITALS
HEART RATE: 57 BPM | TEMPERATURE: 96.9 F | DIASTOLIC BLOOD PRESSURE: 70 MMHG | OXYGEN SATURATION: 94 % | SYSTOLIC BLOOD PRESSURE: 112 MMHG | BODY MASS INDEX: 32.75 KG/M2 | HEIGHT: 69 IN | WEIGHT: 221.1 LBS

## 2025-03-12 DIAGNOSIS — R79.89 ELEVATED LFTS: Primary | ICD-10-CM

## 2025-03-12 DIAGNOSIS — Z79.4 TYPE 2 DIABETES MELLITUS WITHOUT COMPLICATION, WITH LONG-TERM CURRENT USE OF INSULIN: Primary | ICD-10-CM

## 2025-03-12 DIAGNOSIS — M54.50 ACUTE LEFT-SIDED LOW BACK PAIN, UNSPECIFIED WHETHER SCIATICA PRESENT: ICD-10-CM

## 2025-03-12 DIAGNOSIS — K76.0 HEPATIC STEATOSIS: ICD-10-CM

## 2025-03-12 DIAGNOSIS — K76.9 LIVER LESION, LEFT LOBE: ICD-10-CM

## 2025-03-12 DIAGNOSIS — E11.9 TYPE 2 DIABETES MELLITUS WITHOUT COMPLICATION, WITH LONG-TERM CURRENT USE OF INSULIN: Primary | ICD-10-CM

## 2025-03-12 DIAGNOSIS — M51.369 DEGENERATION OF INTERVERTEBRAL DISC OF LUMBAR SPINE WITHOUT DISC HERNIATION: ICD-10-CM

## 2025-03-12 LAB
ALBUMIN SERPL-MCNC: 3.6 G/DL (ref 3.5–5.2)
ALBUMIN/GLOB SERPL: 1.2 G/DL
ALP SERPL-CCNC: 54 U/L (ref 39–117)
ALPHA1 GLOB MFR UR ELPH: 158 MG/DL (ref 90–200)
ALT SERPL W P-5'-P-CCNC: 34 U/L (ref 1–41)
ANION GAP SERPL CALCULATED.3IONS-SCNC: 9.7 MMOL/L (ref 5–15)
AST SERPL-CCNC: 30 U/L (ref 1–40)
BASOPHILS # BLD AUTO: 0.06 10*3/MM3 (ref 0–0.2)
BASOPHILS NFR BLD AUTO: 0.9 % (ref 0–1.5)
BILIRUB BLD-MCNC: NEGATIVE MG/DL
BILIRUB SERPL-MCNC: 0.6 MG/DL (ref 0–1.2)
BUN SERPL-MCNC: 13 MG/DL (ref 8–23)
BUN/CREAT SERPL: 13.8 (ref 7–25)
CALCIUM SPEC-SCNC: 9.3 MG/DL (ref 8.6–10.5)
CERULOPLASMIN SERPL-MCNC: 20 MG/DL (ref 16–31)
CHLORIDE SERPL-SCNC: 103 MMOL/L (ref 98–107)
CLARITY, POC: CLEAR
CO2 SERPL-SCNC: 27.3 MMOL/L (ref 22–29)
COLOR UR: YELLOW
CREAT SERPL-MCNC: 0.94 MG/DL (ref 0.76–1.27)
DEPRECATED RDW RBC AUTO: 39.2 FL (ref 37–54)
EGFRCR SERPLBLD CKD-EPI 2021: 84.5 ML/MIN/1.73
EOSINOPHIL # BLD AUTO: 0.12 10*3/MM3 (ref 0–0.4)
EOSINOPHIL NFR BLD AUTO: 1.9 % (ref 0.3–6.2)
ERYTHROCYTE [DISTWIDTH] IN BLOOD BY AUTOMATED COUNT: 12.2 % (ref 12.3–15.4)
EXPIRATION DATE: ABNORMAL
FERRITIN SERPL-MCNC: 127 NG/ML (ref 30–400)
GGT SERPL-CCNC: 29 U/L (ref 8–61)
GLOBULIN UR ELPH-MCNC: 3 GM/DL
GLUCOSE SERPL-MCNC: 131 MG/DL (ref 65–99)
GLUCOSE UR STRIP-MCNC: NEGATIVE MG/DL
HAV IGM SERPL QL IA: NORMAL
HBA1C MFR BLD: 6.5 % (ref 4.5–5.7)
HBV CORE IGM SERPL QL IA: NORMAL
HBV SURFACE AG SERPL QL IA: NORMAL
HCT VFR BLD AUTO: 44.4 % (ref 37.5–51)
HCV AB SER QL: NORMAL
HGB BLD-MCNC: 15.4 G/DL (ref 13–17.7)
IGA1 MFR SER: 238 MG/DL (ref 70–400)
IGG1 SER-MCNC: 862 MG/DL (ref 700–1600)
IGM SERPL-MCNC: 100 MG/DL (ref 40–230)
IMM GRANULOCYTES # BLD AUTO: 0.02 10*3/MM3 (ref 0–0.05)
IMM GRANULOCYTES NFR BLD AUTO: 0.3 % (ref 0–0.5)
IRON 24H UR-MRATE: 107 MCG/DL (ref 59–158)
IRON SATN MFR SERPL: 23 % (ref 20–50)
KETONES UR QL: NEGATIVE
LEUKOCYTE EST, POC: NEGATIVE
LYMPHOCYTES # BLD AUTO: 1.31 10*3/MM3 (ref 0.7–3.1)
LYMPHOCYTES NFR BLD AUTO: 20.5 % (ref 19.6–45.3)
Lab: ABNORMAL
MCH RBC QN AUTO: 30.7 PG (ref 26.6–33)
MCHC RBC AUTO-ENTMCNC: 34.7 G/DL (ref 31.5–35.7)
MCV RBC AUTO: 88.4 FL (ref 79–97)
MONOCYTES # BLD AUTO: 0.62 10*3/MM3 (ref 0.1–0.9)
MONOCYTES NFR BLD AUTO: 9.7 % (ref 5–12)
NEUTROPHILS NFR BLD AUTO: 4.26 10*3/MM3 (ref 1.7–7)
NEUTROPHILS NFR BLD AUTO: 66.7 % (ref 42.7–76)
NITRITE UR-MCNC: NEGATIVE MG/ML
NRBC BLD AUTO-RTO: 0 /100 WBC (ref 0–0.2)
PH UR: 6 [PH] (ref 5–8)
PLATELET # BLD AUTO: 229 10*3/MM3 (ref 140–450)
PMV BLD AUTO: 9.4 FL (ref 6–12)
POTASSIUM SERPL-SCNC: 3.8 MMOL/L (ref 3.5–5.2)
PROT SERPL-MCNC: 6.6 G/DL (ref 6–8.5)
PROT UR STRIP-MCNC: ABNORMAL MG/DL
RBC # BLD AUTO: 5.02 10*6/MM3 (ref 4.14–5.8)
RBC # UR STRIP: NEGATIVE /UL
SODIUM SERPL-SCNC: 140 MMOL/L (ref 136–145)
SP GR UR: 1.01 (ref 1–1.03)
TIBC SERPL-MCNC: 466 MCG/DL (ref 298–536)
TRANSFERRIN SERPL-MCNC: 313 MG/DL (ref 200–360)
UROBILINOGEN UR QL: NORMAL
WBC NRBC COR # BLD AUTO: 6.39 10*3/MM3 (ref 3.4–10.8)

## 2025-03-12 PROCEDURE — 82728 ASSAY OF FERRITIN: CPT | Performed by: NURSE PRACTITIONER

## 2025-03-12 PROCEDURE — 86364 TISS TRNSGLTMNASE EA IG CLAS: CPT | Performed by: NURSE PRACTITIONER

## 2025-03-12 PROCEDURE — 36415 COLL VENOUS BLD VENIPUNCTURE: CPT | Performed by: NURSE PRACTITIONER

## 2025-03-12 PROCEDURE — 86015 ACTIN ANTIBODY EACH: CPT | Performed by: NURSE PRACTITIONER

## 2025-03-12 PROCEDURE — 86381 MITOCHONDRIAL ANTIBODY EACH: CPT | Performed by: NURSE PRACTITIONER

## 2025-03-12 PROCEDURE — 82784 ASSAY IGA/IGD/IGG/IGM EACH: CPT | Performed by: NURSE PRACTITIONER

## 2025-03-12 PROCEDURE — 82103 ALPHA-1-ANTITRYPSIN TOTAL: CPT | Performed by: NURSE PRACTITIONER

## 2025-03-12 PROCEDURE — 83540 ASSAY OF IRON: CPT | Performed by: NURSE PRACTITIONER

## 2025-03-12 PROCEDURE — 84466 ASSAY OF TRANSFERRIN: CPT | Performed by: NURSE PRACTITIONER

## 2025-03-12 PROCEDURE — 80053 COMPREHEN METABOLIC PANEL: CPT | Performed by: NURSE PRACTITIONER

## 2025-03-12 PROCEDURE — 86231 EMA EACH IG CLASS: CPT | Performed by: NURSE PRACTITIONER

## 2025-03-12 PROCEDURE — 80074 ACUTE HEPATITIS PANEL: CPT | Performed by: NURSE PRACTITIONER

## 2025-03-12 PROCEDURE — 82390 ASSAY OF CERULOPLASMIN: CPT | Performed by: NURSE PRACTITIONER

## 2025-03-12 PROCEDURE — 82977 ASSAY OF GGT: CPT | Performed by: NURSE PRACTITIONER

## 2025-03-12 PROCEDURE — 86038 ANTINUCLEAR ANTIBODIES: CPT | Performed by: NURSE PRACTITIONER

## 2025-03-12 PROCEDURE — 85025 COMPLETE CBC W/AUTO DIFF WBC: CPT | Performed by: NURSE PRACTITIONER

## 2025-03-12 NOTE — ASSESSMENT & PLAN NOTE
Patient's (Body mass index is 32.49 kg/m².) indicates that they are obese (BMI >30) with health conditions that include hypertension, diabetes mellitus, and dyslipidemias . Weight is improving with lifestyle modifications. BMI  is above average; BMI management plan is completed. We discussed portion control and increasing exercise.

## 2025-03-12 NOTE — PATIENT INSTRUCTIONS
Rest back, avoid lifting, pulling, pushing, or straining of back. Do not spend all day lying/sitting, get up and move around as tolerated.  Apply intermittent application of cold packs for no longer than 5 minutes at a time (later, may switch to heat, but do not sleep on heating pad).  Pain medications as prescribed or according to over-the-counter bottle directions (avoid aspirin, aleve, naproxen, ibuprofen if history or current problem of GI bleeding, renal disease, or liver disease, avoid tylenol if current problem or history of liver disease).  Do back stretching exercises as directed.  Call or return to clinic in 7-10 days if symptoms worsen or fail to improve as anticipated.  If any loss of sensation in extremities, loss of bowel/bladder function, inability to walk, or if pain becomes severe, report to ER immediately for further evaluation.

## 2025-03-12 NOTE — PATIENT INSTRUCTIONS
"- Proceed with MRI abdomen for liver evaluation  - Proceed with labs as discussed  -------------------------------------------------------------------------------------  Please keep in mind of the followings healthy tips for \"Fatty Liver\":     Lifestyle modifications, including Mediterranean diet.  Research has proven that reducing your body weight by at least 10% over 12 months has led to significant improvements in liver health.  We recommend exercising for at least 150 minutes/week or increase your activity level by greater than 60 minutes/week  At this time there are unfortunately no proven medications to help improve fatty liver changes however managing your other health concerns such as lowering high cholesterol and blood sugars can reduce risk of future liver injury and prevent progression of liver disease.  We also recommend continued monitoring of liver function test with either our office or with primary care provider every 3 to 6 months for monitoring  Milk thistle over-the-counter supplement.  Take 400 mg once daily.  Weight loss is strongly recommended.   Recommend following a low carb and low sugar diet.   Recommend management of diabetes and elevated cholesterol with primary care provider if indicated.   Alcohol avoidance is recommended.   "

## 2025-03-12 NOTE — ASSESSMENT & PLAN NOTE
Exacerbation.  No red flag features.  Declines Rx.  Agreeable to PT.  Discussed the following measures with the patient:  -For acute back pain pain: rest back, avoid lifting, pulling, pushing, or straining of back; apply intermittent application of cold packs for no longer than 5 minutes at a time (later, may switch to heat, but do not sleep on heating pad); analgesics as directed (avoid aspirin, aleve, naproxen, ibuprofen if history or current problem of GI bleeding, renal disease, or liver disease, avoid tylenol if current problem or history of liver disease).  - Discussed longer term treatment plan of PRN NSAIDs and discussed a home back care exercise program with flexion exercise routine.  Proper lifting with avoidance of heavy lifting discussed.  Sciatica stretch routine given to patient.  - No evidence of red flag symptoms at this time.  Call or return to clinic in 7-10 days if symptoms worsen or fail to improve as anticipated.  If any loss of sensation in extremities, loss of bowel/bladder function, inability to walk, or if pain becomes severe, report to ER immediately for further evaluation.  Patient verbalized understanding and is in agreement with plan of care.

## 2025-03-13 LAB
ANA SER QL: NEGATIVE
ENDOMYSIUM IGA SER QL: NEGATIVE
IGA SERPL-MCNC: 240 MG/DL (ref 61–437)
MITOCHONDRIA M2 IGG SER-ACNC: <20 UNITS (ref 0–20)
SMA IGG SER-ACNC: 4 UNITS (ref 0–19)
TTG IGA SER-ACNC: <2 U/ML (ref 0–3)

## 2025-03-27 RX ORDER — DUTASTERIDE 0.5 MG/1
0.5 CAPSULE, LIQUID FILLED ORAL DAILY
Qty: 90 CAPSULE | Refills: 3 | Status: SHIPPED | OUTPATIENT
Start: 2025-03-27

## 2025-03-31 ENCOUNTER — TREATMENT (OUTPATIENT)
Dept: PHYSICAL THERAPY | Facility: CLINIC | Age: 75
End: 2025-03-31
Payer: MEDICARE

## 2025-03-31 DIAGNOSIS — M54.50 ACUTE LEFT-SIDED LOW BACK PAIN, UNSPECIFIED WHETHER SCIATICA PRESENT: Primary | ICD-10-CM

## 2025-03-31 DIAGNOSIS — M51.360 DEGENERATION OF INTERVERTEBRAL DISC OF LUMBAR REGION WITH DISCOGENIC BACK PAIN: ICD-10-CM

## 2025-03-31 DIAGNOSIS — M67.80 POOR FLEXIBILITY OF TENDON: ICD-10-CM

## 2025-03-31 PROCEDURE — 97535 SELF CARE MNGMENT TRAINING: CPT | Performed by: PHYSICAL THERAPIST

## 2025-03-31 PROCEDURE — 97161 PT EVAL LOW COMPLEX 20 MIN: CPT | Performed by: PHYSICAL THERAPIST

## 2025-03-31 PROCEDURE — 97110 THERAPEUTIC EXERCISES: CPT | Performed by: PHYSICAL THERAPIST

## 2025-03-31 PROCEDURE — 97112 NEUROMUSCULAR REEDUCATION: CPT | Performed by: PHYSICAL THERAPIST

## 2025-03-31 NOTE — PROGRESS NOTES
Physical Therapy Initial Evaluation and Plan of Care  [address] , [address], [place]  [address]    Patient: Herson Ruby   : 1950  Diagnosis/ICD-10 Code:  Acute left-sided low back pain, unspecified whether sciatica present [M54.50]  Referring practitioner: Josh Lee MD  Date of Initial Visit: 3/31/2025  Today's Date: 3/31/2025  Patient seen for 1 sessions           Subjective Questionnaire: Oswestry: 10%      Subjective Evaluation    History of Present Illness  Mechanism of injury: Patient is a pleasant 74 y.o. WM who presents with improving left sided LBP.  Pain started 4 weeks ago, insidious onset and has improved significantly with HEP given to him by PCP.  He is active on his property and uses treadmill, marcelina, weight machine in addition to HEP.  Personal goal is to make sure he's doing HEP correctly.  He asks to continue with HEP due to 60 min drive from home.  PMHx: DM, OA, CA bladder. No significant pain rating today.    Patient Goals  Patient goals for therapy: decreased pain, improved balance and increased motion  Patient goal: drive and sleep without pain         Objective Oswestry function score indicates 10% impairment with limitations in driving, sleeping, standing. MMT LE 5/5 B without pain.  Flexibility:  B hams mod tight with firm end-feel.  Repeated sit to stand x 10 reps in less than 30 sec without arm support.  SLS: L = 10 sec, R = 14 sec.  Deconditioned abdominals with diastasis recti of 4+ finger width.  Patient performed HEP from PCP with VC for improved technique.  Instructed patient in seated ham stretch, repeated sit to stand and SLS to add to HEP.  Patient with good response to exercises.        Assessment & Plan       Assessment  Impairments: abnormal or restricted ROM, activity intolerance, impaired balance, lacks appropriate home exercise program and pain with function   Functional limitations: sleeping, uncomfortable because of pain and standing      Goals  Plan Goals: Patient independent with HEP    Plan  Therapy options: will not be seen for skilled therapy services  Planned therapy interventions: abdominal trunk stabilization, balance/weight-bearing training, flexibility, functional ROM exercises, home exercise program, neuromuscular re-education, strengthening, stretching, therapeutic activities and transfer training  Frequency: 1x week  Duration in weeks: 1  Treatment plan discussed with: patient  Plan details: Patient lives an hour drive away and requests to continue with HEP.      Timed:         Manual Therapy:         mins  83482;     Therapeutic Exercise:    15     mins  17649;     Neuromuscular Darby:    15    mins  55937;    Therapeutic Activity:          mins  81522;     Gait Training:           mins  98929;     Ultrasound:          mins  24859;    Self-care  __8__ mins 90623    Un-Timed:  Electrical Stimulation:         mins  27532 ( );  Dry Needling          mins self-pay 20561  Traction          mins 39510  Low Eval    22      Mins  55220  Mod Eval          Mins  40661  Canal repositioning _____ mins  28717        Timed Treatment:   38   mins   Total Treatment:     60   mins    PT SIGNATURE: Robin A Sprigler, PT   [place] license # [id number]  Electronically signed by Robin A Sprigler, PT, 03/31/25, 9:53 AM EDT    Initial Certification  Certification Period: 3/31/2025 through 6/28/2025  I certify that the therapy services are furnished while this patient is under my care.  The services outlined above are required by this patient, and will be reviewed every 90 days.     PHYSICIAN: Josh Lee MD ______________________________________________________________________________________________     DATE: _________________________________________________________________________________________________________________________________    Please sign and return via fax to [phone]. Thank you, [facility].

## 2025-04-04 ENCOUNTER — HOSPITAL ENCOUNTER (OUTPATIENT)
Dept: MRI IMAGING | Facility: HOSPITAL | Age: 75
Discharge: HOME | End: 2025-04-04
Payer: MEDICARE

## 2025-04-04 DIAGNOSIS — I10 ESSENTIAL HYPERTENSION: ICD-10-CM

## 2025-04-04 PROCEDURE — 25010000002 GADOTERIDOL PER 1 ML: Performed by: NURSE PRACTITIONER

## 2025-04-04 PROCEDURE — A9579 GAD-BASE MR CONTRAST NOS,1ML: HCPCS | Performed by: NURSE PRACTITIONER

## 2025-04-04 PROCEDURE — 74183 MRI ABD W/O CNTR FLWD CNTR: CPT

## 2025-04-04 RX ORDER — AMLODIPINE BESYLATE 10 MG/1
10 TABLET ORAL DAILY
Qty: 90 TABLET | Refills: 3 | Status: SHIPPED | OUTPATIENT
Start: 2025-04-04

## 2025-04-04 RX ADMIN — GADOTERIDOL 20 ML: 279.3 INJECTION, SOLUTION INTRAVENOUS at 07:57

## 2025-04-08 DIAGNOSIS — K86.2 PANCREATIC CYST: Primary | ICD-10-CM

## 2025-06-16 ENCOUNTER — OFFICE VISIT (OUTPATIENT)
Dept: FAMILY MEDICINE CLINIC | Facility: CLINIC | Age: 75
End: 2025-06-16
Payer: MEDICARE

## 2025-06-16 VITALS
OXYGEN SATURATION: 96 % | HEART RATE: 58 BPM | DIASTOLIC BLOOD PRESSURE: 72 MMHG | BODY MASS INDEX: 32.62 KG/M2 | HEIGHT: 69 IN | SYSTOLIC BLOOD PRESSURE: 128 MMHG | RESPIRATION RATE: 18 BRPM | WEIGHT: 220.25 LBS | TEMPERATURE: 98.7 F

## 2025-06-16 DIAGNOSIS — J06.9 UPPER RESPIRATORY TRACT INFECTION, UNSPECIFIED TYPE: Primary | ICD-10-CM

## 2025-06-16 DIAGNOSIS — J02.9 SORE THROAT: ICD-10-CM

## 2025-06-16 DIAGNOSIS — B96.89 ACUTE BACTERIAL SINUSITIS: ICD-10-CM

## 2025-06-16 DIAGNOSIS — J01.90 ACUTE BACTERIAL SINUSITIS: ICD-10-CM

## 2025-06-16 LAB
EXPIRATION DATE: NORMAL
EXPIRATION DATE: NORMAL
FLUAV AG UPPER RESP QL IA.RAPID: NOT DETECTED
FLUBV AG UPPER RESP QL IA.RAPID: NOT DETECTED
INTERNAL CONTROL: NORMAL
INTERNAL CONTROL: NORMAL
Lab: NORMAL
Lab: NORMAL
S PYO AG THROAT QL: NEGATIVE
SARS-COV-2 AG UPPER RESP QL IA.RAPID: NOT DETECTED

## 2025-06-16 PROCEDURE — 99213 OFFICE O/P EST LOW 20 MIN: CPT

## 2025-06-16 PROCEDURE — 87880 STREP A ASSAY W/OPTIC: CPT

## 2025-06-16 PROCEDURE — 3078F DIAST BP <80 MM HG: CPT

## 2025-06-16 PROCEDURE — 87428 SARSCOV & INF VIR A&B AG IA: CPT

## 2025-06-16 PROCEDURE — 3074F SYST BP LT 130 MM HG: CPT

## 2025-06-16 PROCEDURE — 3044F HG A1C LEVEL LT 7.0%: CPT

## 2025-06-16 PROCEDURE — 1125F AMNT PAIN NOTED PAIN PRSNT: CPT

## 2025-06-16 NOTE — PROGRESS NOTES
"Chief Complaint  URI, Fever, and Sore Throat    Subjective          Herson Ruby presents to [facility] for     HPI    URI  Symptoms include fever, chills, night sweats, nasal congestion, rhinorrhea, sore throat, facial pain, cough, dyspnea, symptoms has been present for 4 days.   Cough is productive of thick brown/grey sputum. Feels this is from drainage down from his sinuses, postnasal drip.  Denies wheezing, SOA, has not needed his albuterol inhaler.  Has tried mucinex, tylenol for symptoms.   Patient reports no known sick contacts.       Objective   Vital Signs:   /72 (BP Location: Left arm, Patient Position: Sitting, Cuff Size: Adult)   Pulse 58   Temp 98.7 °F (37.1 °C) (Temporal)   Resp 18   Ht 175.3 cm (69\")   Wt 99.9 kg (220 lb 4 oz)   SpO2 96% Comment: room air  BMI 32.53 kg/m²     Physical Exam  Vitals and nursing note reviewed.   Constitutional:       General: He is not in acute distress.     Appearance: Normal appearance. He is ill-appearing. He is not toxic-appearing or diaphoretic.   HENT:      Head: Normocephalic and atraumatic.      Right Ear: Tympanic membrane, ear canal and external ear normal.      Left Ear: Tympanic membrane, ear canal and external ear normal.      Nose: Congestion and rhinorrhea present.      Right Sinus: Maxillary sinus tenderness present.      Left Sinus: Maxillary sinus tenderness present.      Mouth/Throat:      Mouth: Mucous membranes are moist.      Pharynx: Oropharynx is clear. Uvula midline. Posterior oropharyngeal erythema present. No pharyngeal swelling or oropharyngeal exudate.   Eyes:      Extraocular Movements: Extraocular movements intact.      Pupils: Pupils are equal, round, and reactive to light.   Cardiovascular:      Rate and Rhythm: Normal rate and regular rhythm.      Pulses: Normal pulses.   Pulmonary:      Effort: Pulmonary effort is normal. No respiratory distress.      Breath sounds: Normal breath sounds. "   Musculoskeletal:         General: Normal range of motion.      Cervical back: Normal range of motion and neck supple.   Lymphadenopathy:      Cervical: No cervical adenopathy.   Skin:     General: Skin is warm and dry.   Neurological:      Mental Status: He is alert and oriented to person, place, and time.   Psychiatric:         Mood and Affect: Mood normal.         Behavior: Behavior normal.        Result Review :                 Assessment and Plan    Diagnoses and all orders for this visit:    1. Upper respiratory tract infection, unspecified type (Primary)  -     POCT rapid strep A  -     POCT SARS-CoV-2 + Flu Antigen AIDEN    2. Sore throat  -     POCT rapid strep A  -     POCT SARS-CoV-2 + Flu Antigen AIDEN    3. Acute bacterial sinusitis  -     amoxicillin-clavulanate (AUGMENTIN) 875-125 MG per tablet; Take 1 tablet by mouth 2 (Two) Times a Day for 10 days.  Dispense: 20 tablet; Refill: 0    Patient with worsening bilateral maxillary sinus pressure and increasingly purulent drainage for 5 days.  He has had some chills and night sweats.  We will go ahead and treat empirically for presumed bacterial sinusitis with Augmentin x 10 days.  He has notably not had any wheezing or SOA or any other symptoms to suggest an exacerbation of the COPD.  RTC by the end of the week if symptoms are not improving.        Josh Lee MD    NOTE: [employer], per Health Information accessibility laws, allows progress notes to be visible to patients. Please note that these notes will include professional medical terminology that may be somewhat confusing without some interpretation from your medical professional team. The intent of progress notes is to communicate information between any medical professionals involved in your case, or to serve as future reference for myself or any other provider when reviewing your case, as well as a reference for the patient viewing the record. Please ask a member of the medical team if you have  any questions about terminology or content of note.    DISCLAIMER: Part of this note may be an electronic transcription/translation of spoken language to printed text using the Dragon Dictation System.

## 2025-06-19 ENCOUNTER — TELEPHONE (OUTPATIENT)
Dept: FAMILY MEDICINE CLINIC | Facility: CLINIC | Age: 75
End: 2025-06-19
Payer: MEDICARE

## 2025-06-19 NOTE — TELEPHONE ENCOUNTER
"  Caller: Herson Ruby \"Ishan\"    Relationship: Self    Best call back number:   Telephone Information:   Mobile [phone]         What medication are you requesting: NEW ANTIBIOTIC    What are your current symptoms: CONGESTION, NASAL DRIP, COUGH    How long have you been experiencing symptoms: 06/11/25    Have you had these symptoms before:    [x] Yes  [] No    Have you been treated for these symptoms before:   [x] Yes  [] No    If a prescription is needed, what is your preferred pharmacy and phone number: [facility][id number] - [place], [place] [address] AT [place] [address]PING [facility] [phone] [facility] [phone]      Additional notes: PATIENT STATED HIS DOCTOR DIRECTED HIM TO CALL IN TO HAVE A NEW MEDICATION SENT IN FOR HIS SYMPTOMS IF HE DID NOT IMPROVE WITH WHAT HE WAS ORIGINALLY PRESCRIBED WITH. PATIENT STATED HE ONLY IMPROVED SLIGHTLY AND IS REQUESTING A NEW MEDICATION BE SENT IN.     "

## 2025-06-20 RX ORDER — DOXYCYCLINE 100 MG/1
100 CAPSULE ORAL 2 TIMES DAILY
Qty: 14 CAPSULE | Refills: 0 | Status: SHIPPED | OUTPATIENT
Start: 2025-06-20 | End: 2025-06-27

## 2025-08-27 DIAGNOSIS — E78.2 MIXED HYPERLIPIDEMIA: ICD-10-CM

## 2025-08-27 RX ORDER — EZETIMIBE 10 MG/1
10 TABLET ORAL DAILY
Qty: 90 TABLET | Refills: 3 | Status: SHIPPED | OUTPATIENT
Start: 2025-08-27